# Patient Record
Sex: FEMALE | Race: WHITE | NOT HISPANIC OR LATINO | Employment: FULL TIME | URBAN - METROPOLITAN AREA
[De-identification: names, ages, dates, MRNs, and addresses within clinical notes are randomized per-mention and may not be internally consistent; named-entity substitution may affect disease eponyms.]

---

## 2017-04-25 ENCOUNTER — CONVERSION ENCOUNTER (OUTPATIENT)
Dept: GENERAL RADIOLOGY | Facility: HOSPITAL | Age: 42
End: 2017-04-25

## 2019-08-02 ENCOUNTER — HOSPITAL ENCOUNTER (OUTPATIENT)
Dept: OTHER | Facility: HOSPITAL | Age: 44
Discharge: HOME | End: 2019-08-02

## 2020-06-16 ENCOUNTER — OFFICE VISIT CONVERTED (OUTPATIENT)
Dept: FAMILY MEDICINE CLINIC | Facility: CLINIC | Age: 45
End: 2020-06-16
Attending: NURSE PRACTITIONER

## 2020-06-16 ENCOUNTER — CONVERSION ENCOUNTER (OUTPATIENT)
Dept: FAMILY MEDICINE CLINIC | Facility: CLINIC | Age: 45
End: 2020-06-16

## 2021-03-10 ENCOUNTER — IMMUNIZATION (OUTPATIENT)
Dept: VACCINE CLINIC | Facility: HOSPITAL | Age: 46
End: 2021-03-10

## 2021-03-10 PROCEDURE — 0001A: CPT | Performed by: INTERNAL MEDICINE

## 2021-03-10 PROCEDURE — 91300 HC SARSCOV02 VAC 30MCG/0.3ML IM: CPT | Performed by: INTERNAL MEDICINE

## 2021-03-31 ENCOUNTER — IMMUNIZATION (OUTPATIENT)
Dept: VACCINE CLINIC | Facility: HOSPITAL | Age: 46
End: 2021-03-31

## 2021-03-31 PROCEDURE — 91300 HC SARSCOV02 VAC 30MCG/0.3ML IM: CPT | Performed by: INTERNAL MEDICINE

## 2021-03-31 PROCEDURE — 0002A: CPT | Performed by: INTERNAL MEDICINE

## 2021-05-13 NOTE — PROGRESS NOTES
Progress Note      Patient Name: Mere Ivory   Patient ID: [id number]   Sex: Female   YOB: 1975    Primary Care Provider: Bala SANDOVAL    Visit Date: June 16, 2020    Provider: JAIME Kramer   Location: [facility]   Location Address: [address] [place] [address], [address] [place] [address]  [id number]   Location Phone: [phone]          Chief Complaint     The patient is here for a f/u of htn.       History Of Present Illness  Mere Ivory is a 44 year old /White female who presents for evaluation and treatment of:      Hypertension:  has been out of medicine and B/P 142/98.    Right foot pain for over a year.  Ibuprofen not helping but does manage to be able a shift.  Tenderness ans swelling when walking a long time           Past Medical History  Disease Name Date Onset Notes   Arthritis --  --    Hypertension --  --          Past Surgical History  Procedure Name Date Notes   LASIK 2011 --          Medication List  Name Date Started Instructions   hydrochlorothiazide 12.5 mg oral tablet 06/26/2018 TAKE 1 TABLET BY MOUTH EVERY DAY   losartan 100 mg oral tablet 01/22/2018 TAKE 1 TABLET (100 MG) BY ORAL ROUTE ONCE DAILY FOR 30 DAYS         Allergy List  Allergen Name Date Reaction Notes   No known history of drug allergy --  --  --          Family Medical History  Disease Name Relative/Age Notes   Diabetes Mother/   --          Social History  Finding Status Start/Stop Quantity Notes   Alcohol Current some day --/-- 1 x week --    Tobacco Former --/-- --  quit 2012 was a social smoker for 18 years         Immunizations  NameDate Admin Mfg Trade Name Lot Number Route Inj VIS Given VIS Publication   Prohbdqke26/01/2019 SKB Fluarix, quadrivalent, preservative free [id number] NE NE 06/16/2020    Comments:          Review of Systems  · Constitutional  o Denies  o : fever, fatigue, weight loss, weight gain  · Cardiovascular  o Denies  o : lower extremity edema, claudication, chest  "pressure, palpitations  · Respiratory  o Denies  o : shortness of breath, wheezing, cough, hemoptysis, dyspnea on exertion  · Gastrointestinal  o Denies  o : nausea, vomiting, diarrhea, constipation, abdominal pain      Vitals  Date Time BP Position Site L\R Cuff Size HR RR TEMP (F) WT  HT  BMI kg/m2 BSA m2 O2 Sat HC       06/16/2020 08:04 /98 Sitting    81 - R 16 97.1 280lbs 0oz 5'  8\" 42.57 2.47 97 %          Physical Examination  · Constitutional  o Appearance  o : well-nourished, well developed, alert, in no acute distress  · Eyes  o Conjunctivae  o : conjunctivae normal  o Sclerae  o : sclerae white  o Pupils and Irises  o : pupils equal, round, and reactive to light and accommodation bilaterally  o Corneas  o : tear film normal, no lesions present  o Eyelids/Ocular Adnexae  o : eyelid appearance normal, no exudates present, eye moisture level normal  · Respiratory  o Respiratory Effort  o : breathing unlabored  o Inspection of Chest  o : normal appearance, no retractions  o Auscultation of Lungs  o : normal breath sounds throughout  · Cardiovascular  o Heart  o :   § Auscultation of Heart  § : regular rate and rhythm without murmur, PMI normal  o Peripheral Vascular System  o :   § Extremities  § : no cyanosis, clubbing or edema; less than 2 second refill noted  · Musculoskeletal  o General  o : No joint swelling or deformity noted. Muscle tone, strength and development grossly normal.  · Skin and Subcutaneous Tissue  o General Inspection  o : no rashes or lesions present, no areas of discoloration  · Neurologic  o Mental Status Examination  o : judgement, insight intact, modd and affect appropriate  o Motor Examination  o : strength grossly intact in all four extremities  o Gait and Station  o : normal gait, able to stand without difficulty          Assessment  · Screening for depression     V79.0/Z13.89  · Visit for screening mammogram     V76.12/Z12.31  · Essential hypertension     401.9/I10  · Foot " pain, right     729.5/M79.671      Plan  · Orders  o Annual depression screening, 15 minutes (, 34270) - V79.0/Z13.89 - 06/16/2020  o ACO-18: Negative screen for clinical depression using a standardized tool () - V79.0/Z13.89 - 06/16/2020  o Screening Mammography; Bilateral 3D (21273, , 06032) - V76.12/Z12.31 - 06/16/2020  o HTN/Lipid Panel (CMP, Lipid) [facility] (08048, 26341) - 401.9/I10 - 06/16/2020  o CBC with Auto Diff [facility] (01840) - 401.9/I10 - 06/16/2020  o Urinalysis with Reflex Microscopy if abnormal ([facility]) (69483) - 401.9/I10 - 06/16/2020  o ACO-39: Current medications updated and reviewed () - - 06/16/2020  o ACO-14: Influenza immunization administered or previously received () - - 06/16/2020  o Foot (Right) 3 or more views X-Ray [facility] (71397-HI) - 729.5/M79.671 - 06/16/2020  · Medications  o hydrochlorothiazide 12.5 mg oral tablet   SIG: TAKE 1 TABLET BY MOUTH EVERY DAY for 90 days   DISP: (90) Tablet with 1 refills  Refilled on 06/16/2020     o losartan 100 mg oral tablet   SIG: TAKE 1 TABLET (100 MG) BY ORAL ROUTE ONCE DAILY FOR 30 DAYS for 90 days   DISP: (90) Tablet with 1 refills  Refilled on 06/16/2020     o Medications have been Reconciled  o Transition of Care or Provider Policy  · Instructions  o Depression Screen completed and scanned into the EMR under the designated folder within the patient's documents.  o Today's PHQ-9 result is 4___  o Patient was educated/instructed on their diagnosis, treatment and medications prior to discharge from the clinic today.  o Minutes spent with patient including greater than 50% in Education/Counseling/Care Coordination.  o Time spent with the patient was minutes, more than 50% face to face.  · Disposition  o Return in 6 months  o Return for Well woman exam            Electronically Signed by: JAIME Kramer -Author on June 16, 2020 08:34:44 AM

## 2021-05-15 VITALS
SYSTOLIC BLOOD PRESSURE: 142 MMHG | RESPIRATION RATE: 16 BRPM | WEIGHT: 280 LBS | OXYGEN SATURATION: 97 % | BODY MASS INDEX: 42.44 KG/M2 | TEMPERATURE: 97.1 F | HEART RATE: 81 BPM | HEIGHT: 68 IN | DIASTOLIC BLOOD PRESSURE: 98 MMHG

## 2021-08-12 ENCOUNTER — TELEPHONE (OUTPATIENT)
Dept: FAMILY MEDICINE CLINIC | Facility: CLINIC | Age: 46
End: 2021-08-12

## 2021-08-12 RX ORDER — HYDROCHLOROTHIAZIDE 12.5 MG/1
12.5 TABLET ORAL DAILY
Qty: 30 TABLET | Refills: 0 | Status: SHIPPED | OUTPATIENT
Start: 2021-08-12 | End: 2021-08-26 | Stop reason: SDUPTHER

## 2021-08-12 RX ORDER — LOSARTAN POTASSIUM 100 MG/1
100 TABLET ORAL DAILY
Qty: 30 TABLET | Refills: 0 | Status: SHIPPED | OUTPATIENT
Start: 2021-08-12 | End: 2021-08-26 | Stop reason: SDUPTHER

## 2021-08-12 RX ORDER — LOSARTAN POTASSIUM 100 MG/1
100 TABLET ORAL DAILY
COMMUNITY
Start: 2021-07-01 | End: 2021-08-12 | Stop reason: SDUPTHER

## 2021-08-12 RX ORDER — HYDROCHLOROTHIAZIDE 12.5 MG/1
12.5 TABLET ORAL DAILY
COMMUNITY
Start: 2021-07-01 | End: 2021-08-12 | Stop reason: SDUPTHER

## 2021-08-12 NOTE — TELEPHONE ENCOUNTER
Caller: Mere Ivory    Relationship: Self    Best call back number: [phone]    Medication needed:   Requested Prescriptions      No prescriptions requested or ordered in this encounter       LOSARTAN 100MG  ONE TABLET DAILY   HYDROCHLOROTHIAZIDE 12.5MG ONE TABLET     When do you need the refill by: 08/12/2021    What additional details did the patient provide when requesting the medication: THE PATIENT STATED SHE ONLY HAS THREE DAYS LEFT OF HER MEDICATIONS. SHE SCHEDULED THE FIRST AVAILABLE APPOINTMENT WITH PCP AND WOULD LIKE A REFILL TO LAST UNTIL HER NEXT APPOINTMENT. SHE WOULD LIKE A CALL BACK WHEN THIS MEDICATION IS APPROVED AND SENT TO THE PHARMACY AND A VOICEMAIL IF SHE CANNOT ANSWER.    Does the patient have less than a 3 day supply:  [x] Yes  [] No    What is the patient's preferred pharmacy: [facility] [employer] [address] [address] - [phone] [facility] [phone] [facility]          3

## 2021-08-26 ENCOUNTER — TELEPHONE (OUTPATIENT)
Dept: FAMILY MEDICINE CLINIC | Facility: CLINIC | Age: 46
End: 2021-08-26

## 2021-08-26 DIAGNOSIS — I10 HYPERTENSION, UNSPECIFIED TYPE: Primary | ICD-10-CM

## 2021-08-26 RX ORDER — LOSARTAN POTASSIUM 100 MG/1
100 TABLET ORAL DAILY
Qty: 30 TABLET | Refills: 0 | Status: SHIPPED | OUTPATIENT
Start: 2021-08-26 | End: 2021-09-23 | Stop reason: SDUPTHER

## 2021-08-26 RX ORDER — HYDROCHLOROTHIAZIDE 12.5 MG/1
12.5 TABLET ORAL DAILY
Qty: 30 TABLET | Refills: 0 | Status: SHIPPED | OUTPATIENT
Start: 2021-08-26 | End: 2021-09-23 | Stop reason: SDUPTHER

## 2021-08-26 NOTE — TELEPHONE ENCOUNTER
Caller: Mere Ivory    Relationship to patient: Self    Best call back number: [phone]    Patient is needing: PATIENT CALLED STATING SHE IS HAD TO RESCHEDULE HER APPOINTMENT WITH HER PCP ON 09/10/2021 DUE TO HER HAVING TO WORK, SHE WANTED TO BE SEEN SOONER BUT THERE WAS NO AVAILABILITY. FIRST AVAILABLE IS NOT UNTIL 09/23/2021. PATIENT IS WANTING TO KNOW IF SHE CAN GET ENOUGH OF A SUPPLY FOR HER TO LAST TILL THEN FOR HER hydroCHLOROthiazide (HYDRODIURIL) 12.5 MG tablet AND    losartan (COZAAR) 100 MG tablet     PATIENT WOULD LIKE A CALL BACK WHEN THIS GETS SENT TO THE PHARMACY PLEASE ADVISE THANK YOU.         [facility] - [place], [place] - [address] [address] - [phone]  - [phone]   [phone]

## 2021-09-23 ENCOUNTER — OFFICE VISIT (OUTPATIENT)
Dept: FAMILY MEDICINE CLINIC | Facility: CLINIC | Age: 46
End: 2021-09-23

## 2021-09-23 VITALS
OXYGEN SATURATION: 97 % | SYSTOLIC BLOOD PRESSURE: 110 MMHG | DIASTOLIC BLOOD PRESSURE: 70 MMHG | HEART RATE: 77 BPM | TEMPERATURE: 97 F | RESPIRATION RATE: 16 BRPM

## 2021-09-23 DIAGNOSIS — R25.2 LEG CRAMPS: ICD-10-CM

## 2021-09-23 DIAGNOSIS — I10 HYPERTENSION, UNSPECIFIED TYPE: ICD-10-CM

## 2021-09-23 DIAGNOSIS — R63.5 WEIGHT GAIN: ICD-10-CM

## 2021-09-23 DIAGNOSIS — F33.1 MODERATE EPISODE OF RECURRENT MAJOR DEPRESSIVE DISORDER (HCC): ICD-10-CM

## 2021-09-23 DIAGNOSIS — Z23 NEED FOR INFLUENZA VACCINATION: Primary | ICD-10-CM

## 2021-09-23 PROBLEM — M19.90 ARTHRITIS: Status: ACTIVE | Noted: 2021-09-23

## 2021-09-23 PROCEDURE — 99214 OFFICE O/P EST MOD 30 MIN: CPT | Performed by: NURSE PRACTITIONER

## 2021-09-23 PROCEDURE — 90686 IIV4 VACC NO PRSV 0.5 ML IM: CPT | Performed by: NURSE PRACTITIONER

## 2021-09-23 PROCEDURE — 90471 IMMUNIZATION ADMIN: CPT | Performed by: NURSE PRACTITIONER

## 2021-09-23 RX ORDER — LOSARTAN POTASSIUM 100 MG/1
100 TABLET ORAL DAILY
Qty: 90 TABLET | Refills: 1 | Status: SHIPPED | OUTPATIENT
Start: 2021-09-23 | End: 2021-11-19 | Stop reason: SDUPTHER

## 2021-09-23 RX ORDER — BUPROPION HYDROCHLORIDE 150 MG/1
150 TABLET ORAL DAILY
Qty: 90 TABLET | Refills: 1 | Status: SHIPPED | OUTPATIENT
Start: 2021-09-23 | End: 2021-11-19 | Stop reason: SDUPTHER

## 2021-09-23 RX ORDER — HYDROCHLOROTHIAZIDE 12.5 MG/1
12.5 TABLET ORAL DAILY
Qty: 30 TABLET | Refills: 0 | Status: SHIPPED | OUTPATIENT
Start: 2021-09-23 | End: 2021-11-19 | Stop reason: SDUPTHER

## 2021-09-23 NOTE — PROGRESS NOTES
Answers for HPI/ROS submitted by the patient on 9/23/2021  Please describe your symptoms.: Med refill and possible new med request  Have you had these symptoms before?: Yes  How long have you been having these symptoms?: Greater than 2 weeks  Please list any medications you are currently taking for this condition.: Losartan & Hctz  What is the primary reason for your visit?: Other    Chief Complaint  Hypertension (f/u), Anxiety (wants to restart buspar), and Obesity    Subjective        Mere Ivory presents to [facility]  Hypertension:  Doing well on medication and needs refills.  Only taking losartan consistently and hctz occasional due to cramping in legs.    Depression/ Anxiety:  Would like to restart buspirone.  Has had a little depression.    Obesity:  Has been heavy since childhood.         Past History:    Medical History: has a past medical history of Arthritis and HTN (hypertension).     Surgical History: has a past surgical history that includes LASIK (2011).     Family History: family history includes Diabetes in her mother.     Social History: reports that she has quit smoking. She smoked 0.00 packs per day for 0.00 years. She has never used smokeless tobacco. She reports current alcohol use. She reports that she does not use drugs.    Allergies: Patient has no known allergies.          Current Outpatient Medications:   •  hydroCHLOROthiazide (HYDRODIURIL) 12.5 MG tablet, Take 1 tablet by mouth Daily., Disp: 30 tablet, Rfl: 0  •  losartan (COZAAR) 100 MG tablet, Take 1 tablet by mouth Daily., Disp: 90 tablet, Rfl: 1  •  buPROPion XL (Wellbutrin XL) 150 MG 24 hr tablet, Take 1 tablet by mouth Daily., Disp: 90 tablet, Rfl: 1    Medications Discontinued During This Encounter   Medication Reason   • hydroCHLOROthiazide (HYDRODIURIL) 12.5 MG tablet Reorder   • losartan (COZAAR) 100 MG tablet Reorder         Review of Systems   Constitutional: Negative for fever.    Respiratory: Negative for cough and shortness of breath.    Cardiovascular: Negative for chest pain, palpitations and leg swelling.   Neurological: Negative for dizziness, weakness, numbness and headache.        Objective         Vitals:    09/23/21 1134   BP: 110/70   BP Location: Right arm   Patient Position: Sitting   Cuff Size: Large Adult   Pulse: 77   Resp: 16   Temp: 97 °F (36.1 °C)   TempSrc: Infrared   SpO2: 97%     There is no height or weight on file to calculate BMI.         Physical Exam  Vitals reviewed.   Constitutional:       Appearance: Normal appearance. She is well-developed.   HENT:      Head: Normocephalic and atraumatic.      Mouth/Throat:      Pharynx: No oropharyngeal exudate.   Eyes:      Conjunctiva/sclera: Conjunctivae normal.      Pupils: Pupils are equal, round, and reactive to light.   Cardiovascular:      Rate and Rhythm: Normal rate and regular rhythm.      Heart sounds: No murmur heard.   No friction rub. No gallop.    Pulmonary:      Effort: Pulmonary effort is normal.      Breath sounds: Normal breath sounds. No wheezing or rhonchi.   Skin:     General: Skin is warm and dry.   Neurological:      Mental Status: She is alert and oriented to person, place, and time.   Psychiatric:         Mood and Affect: Mood and affect normal.         Behavior: Behavior normal.         Thought Content: Thought content normal.         Judgment: Judgment normal.             Result Review :               Assessment and Plan     Diagnoses and all orders for this visit:    1. Need for influenza vaccination (Primary)  -     FluLaval/Fluarix >6 Months (2420-5179)    2. Hypertension, unspecified type  -     losartan (COZAAR) 100 MG tablet; Take 1 tablet by mouth Daily.  Dispense: 90 tablet; Refill: 1  -     hydroCHLOROthiazide (HYDRODIURIL) 12.5 MG tablet; Take 1 tablet by mouth Daily.  Dispense: 30 tablet; Refill: 0  -     Comprehensive metabolic panel; Future  -     Lipid Panel w/ Chol/HDL Ratio;  Future  -     Urinalysis With Culture If Indicated -; Future  -     CBC No Differential; Future  -     Magnesium; Future    3. Leg cramps    4. Moderate episode of recurrent major depressive disorder (CMS/HCC)  -     buPROPion XL (Wellbutrin XL) 150 MG 24 hr tablet; Take 1 tablet by mouth Daily.  Dispense: 90 tablet; Refill: 1    5. Weight gain  Comments:  will contemplate wegovy.              Follow Up     Return in about 2 months (around 11/23/2021).    Patient was given instructions and counseling regarding her condition or for health maintenance advice. Please see specific information pulled into the AVS if appropriate.

## 2021-10-20 ENCOUNTER — TELEPHONE (OUTPATIENT)
Dept: FAMILY MEDICINE CLINIC | Facility: CLINIC | Age: 46
End: 2021-10-20

## 2021-10-20 NOTE — TELEPHONE ENCOUNTER
I will call to get consent for the monoclonal antibodies patient states she is feeling much better and does not feel like she needs it at this time.  So is just going away with getting the monoclonal antibodies.  Denies shortness of breath or fever.

## 2021-11-03 ENCOUNTER — LAB (OUTPATIENT)
Dept: LAB | Facility: HOSPITAL | Age: 46
End: 2021-11-03

## 2021-11-03 DIAGNOSIS — I10 HYPERTENSION, UNSPECIFIED TYPE: ICD-10-CM

## 2021-11-03 LAB
ALBUMIN SERPL-MCNC: 4.1 G/DL (ref 3.5–5.2)
ALBUMIN/GLOB SERPL: 1.1 G/DL
ALP SERPL-CCNC: 62 U/L (ref 39–117)
ALT SERPL W P-5'-P-CCNC: 23 U/L (ref 1–33)
ANION GAP SERPL CALCULATED.3IONS-SCNC: 8.2 MMOL/L (ref 5–15)
AST SERPL-CCNC: 18 U/L (ref 1–32)
BILIRUB SERPL-MCNC: 0.9 MG/DL (ref 0–1.2)
BUN SERPL-MCNC: 11 MG/DL (ref 6–20)
BUN/CREAT SERPL: 16.2 (ref 7–25)
CALCIUM SPEC-SCNC: 9 MG/DL (ref 8.6–10.5)
CHLORIDE SERPL-SCNC: 101 MMOL/L (ref 98–107)
CHOLEST SERPL-MCNC: 191 MG/DL (ref 0–200)
CO2 SERPL-SCNC: 26.8 MMOL/L (ref 22–29)
CREAT SERPL-MCNC: 0.68 MG/DL (ref 0.57–1)
DEPRECATED RDW RBC AUTO: 40.3 FL (ref 37–54)
ERYTHROCYTE [DISTWIDTH] IN BLOOD BY AUTOMATED COUNT: 13.2 % (ref 12.3–15.4)
GFR SERPL CREATININE-BSD FRML MDRD: 92 ML/MIN/1.73
GLOBULIN UR ELPH-MCNC: 3.6 GM/DL
GLUCOSE SERPL-MCNC: 87 MG/DL (ref 65–99)
HCT VFR BLD AUTO: 38.6 % (ref 34–46.6)
HDLC SERPL QL: 3.9
HDLC SERPL-MCNC: 49 MG/DL (ref 40–60)
HGB BLD-MCNC: 13 G/DL (ref 12–15.9)
LDLC SERPL CALC-MCNC: 124 MG/DL (ref 0–100)
MAGNESIUM SERPL-MCNC: 2.1 MG/DL (ref 1.6–2.6)
MCH RBC QN AUTO: 28.5 PG (ref 26.6–33)
MCHC RBC AUTO-ENTMCNC: 33.7 G/DL (ref 31.5–35.7)
MCV RBC AUTO: 84.6 FL (ref 79–97)
PLATELET # BLD AUTO: 253 10*3/MM3 (ref 140–450)
PMV BLD AUTO: 10.5 FL (ref 6–12)
POTASSIUM SERPL-SCNC: 4.3 MMOL/L (ref 3.5–5.2)
PROT SERPL-MCNC: 7.7 G/DL (ref 6–8.5)
RBC # BLD AUTO: 4.56 10*6/MM3 (ref 3.77–5.28)
SODIUM SERPL-SCNC: 136 MMOL/L (ref 136–145)
TRIGL SERPL-MCNC: 101 MG/DL (ref 0–150)
VLDLC SERPL-MCNC: 18 MG/DL (ref 5–40)
WBC # BLD AUTO: 7.44 10*3/MM3 (ref 3.4–10.8)

## 2021-11-03 PROCEDURE — 83735 ASSAY OF MAGNESIUM: CPT

## 2021-11-03 PROCEDURE — 85027 COMPLETE CBC AUTOMATED: CPT

## 2021-11-03 PROCEDURE — 80053 COMPREHEN METABOLIC PANEL: CPT

## 2021-11-03 PROCEDURE — 36415 COLL VENOUS BLD VENIPUNCTURE: CPT

## 2021-11-03 PROCEDURE — 80061 LIPID PANEL: CPT

## 2021-11-18 ENCOUNTER — LAB (OUTPATIENT)
Dept: LAB | Facility: HOSPITAL | Age: 46
End: 2021-11-18

## 2021-11-18 DIAGNOSIS — I10 HYPERTENSION, UNSPECIFIED TYPE: ICD-10-CM

## 2021-11-18 LAB

## 2021-11-18 PROCEDURE — 81001 URINALYSIS AUTO W/SCOPE: CPT

## 2021-11-19 ENCOUNTER — OFFICE VISIT (OUTPATIENT)
Dept: FAMILY MEDICINE CLINIC | Facility: CLINIC | Age: 46
End: 2021-11-19

## 2021-11-19 VITALS — DIASTOLIC BLOOD PRESSURE: 90 MMHG | HEART RATE: 91 BPM | TEMPERATURE: 98.2 F | SYSTOLIC BLOOD PRESSURE: 142 MMHG

## 2021-11-19 DIAGNOSIS — I10 PRIMARY HYPERTENSION: ICD-10-CM

## 2021-11-19 DIAGNOSIS — R31.9 HEMATURIA, UNSPECIFIED TYPE: Primary | ICD-10-CM

## 2021-11-19 DIAGNOSIS — Z12.4 SCREENING FOR CERVICAL CANCER: ICD-10-CM

## 2021-11-19 DIAGNOSIS — I10 HYPERTENSION, UNSPECIFIED TYPE: ICD-10-CM

## 2021-11-19 DIAGNOSIS — Z12.11 SCREEN FOR COLON CANCER: Primary | ICD-10-CM

## 2021-11-19 DIAGNOSIS — F33.1 MODERATE EPISODE OF RECURRENT MAJOR DEPRESSIVE DISORDER (HCC): ICD-10-CM

## 2021-11-19 PROCEDURE — 99214 OFFICE O/P EST MOD 30 MIN: CPT | Performed by: NURSE PRACTITIONER

## 2021-11-19 RX ORDER — LOSARTAN POTASSIUM 100 MG/1
100 TABLET ORAL DAILY
Qty: 90 TABLET | Refills: 1 | Status: SHIPPED | OUTPATIENT
Start: 2021-11-19 | End: 2022-09-26

## 2021-11-19 RX ORDER — BUPROPION HYDROCHLORIDE 300 MG/1
300 TABLET ORAL DAILY
Qty: 90 TABLET | Refills: 1 | Status: SHIPPED | OUTPATIENT
Start: 2021-11-19 | End: 2022-05-27

## 2021-11-19 RX ORDER — HYDROCHLOROTHIAZIDE 12.5 MG/1
12.5 TABLET ORAL DAILY
Qty: 30 TABLET | Refills: 0 | Status: SHIPPED | OUTPATIENT
Start: 2021-11-19 | End: 2022-04-15

## 2021-11-19 NOTE — PROGRESS NOTES
Chief Complaint  Depression (f/u wants to discuss increasing medication), Anxiety (f/u), Hypertension (f/u), ob/gyn referral, and Foot Pain (heel right)    Subjective        Mere Ivory presents to [facility]  Had a spur and bursitis in left heel. First steroid shot helped but the second didn't.  Tried a third time and not helped. Has a brace for foot that didn't help.  Would like a second opinion.    Depression:  Doing better than she was.  The feeling of wanting to jump out of skin is gone.  But still having some symptoms.      Hypertension:  Doing well but blood pressure up a little but had some coffee before coming in.  142/90.    OB/Gyn:  Would like a referral to see for perimenopausal symptoms and having some symptoms.  Was discussed with that may have PCOS.  Had bee prescribed metformin but mom had kidney failure so never started.  States has irregular periods for years.        Past History:    Medical History: has a past medical history of Arthritis and HTN (hypertension).     Surgical History: has a past surgical history that includes LASIK (2011).     Family History: family history includes Diabetes in her mother.     Social History: reports that she has quit smoking. She smoked 0.00 packs per day for 0.00 years. She has never used smokeless tobacco. She reports current alcohol use. She reports that she does not use drugs.    Allergies: Patient has no known allergies.          Current Outpatient Medications:   •  buPROPion XL (Wellbutrin XL) 300 MG 24 hr tablet, Take 1 tablet by mouth Daily., Disp: 90 tablet, Rfl: 1  •  hydroCHLOROthiazide (HYDRODIURIL) 12.5 MG tablet, Take 1 tablet by mouth Daily., Disp: 30 tablet, Rfl: 0  •  losartan (COZAAR) 100 MG tablet, Take 1 tablet by mouth Daily., Disp: 90 tablet, Rfl: 1    Medications Discontinued During This Encounter   Medication Reason   • buPROPion XL (Wellbutrin XL) 150 MG 24 hr tablet Reorder         Review of  Systems   Constitutional: Negative for fever.   Respiratory: Negative for cough and shortness of breath.    Cardiovascular: Negative for chest pain, palpitations and leg swelling.   Neurological: Negative for dizziness, weakness, numbness and headache.        Objective         Vitals:    11/19/21 1406   BP: 142/90   BP Location: Right arm   Patient Position: Sitting   Cuff Size: Large Adult   Pulse: 91   Temp: 98.2 °F (36.8 °C)   TempSrc: Infrared     There is no height or weight on file to calculate BMI.         Physical Exam  Vitals reviewed.   Constitutional:       Appearance: Normal appearance. She is well-developed.   HENT:      Head: Normocephalic and atraumatic.      Mouth/Throat:      Pharynx: No oropharyngeal exudate.   Eyes:      Conjunctiva/sclera: Conjunctivae normal.      Pupils: Pupils are equal, round, and reactive to light.   Cardiovascular:      Rate and Rhythm: Normal rate and regular rhythm.      Heart sounds: No murmur heard.  No friction rub. No gallop.    Pulmonary:      Effort: Pulmonary effort is normal.      Breath sounds: Normal breath sounds. No wheezing or rhonchi.   Skin:     General: Skin is warm and dry.   Neurological:      Mental Status: She is alert and oriented to person, place, and time.   Psychiatric:         Mood and Affect: Mood and affect normal.         Behavior: Behavior normal.         Thought Content: Thought content normal.         Judgment: Judgment normal.             Result Review :               Assessment and Plan     Diagnoses and all orders for this visit:    1. Screen for colon cancer (Primary)  -     Cologuard - Stool, Per Rectum; Future    2. Screening for cervical cancer  -     Ambulatory Referral to Obstetrics / Gynecology    3. Moderate episode of recurrent major depressive disorder (HCC)  -     buPROPion XL (Wellbutrin XL) 300 MG 24 hr tablet; Take 1 tablet by mouth Daily.  Dispense: 90 tablet; Refill: 1    4. Primary hypertension  Comments:  continue  losartan at current dose.              Follow Up     Return in about 6 months (around 5/19/2022) for Next scheduled follow up.    Patient was given instructions and counseling regarding her condition or for health maintenance advice. Please see specific information pulled into the AVS if appropriate.

## 2022-02-09 ENCOUNTER — TELEPHONE (OUTPATIENT)
Dept: SURGERY | Facility: CLINIC | Age: 47
End: 2022-02-09

## 2022-02-09 DIAGNOSIS — R19.5 POSITIVE COLORECTAL CANCER SCREENING USING COLOGUARD TEST: Primary | ICD-10-CM

## 2022-02-11 NOTE — TELEPHONE ENCOUNTER
3RD ATTEMPT TO REACH PT NO ANSWER LMOM, HUB SENDING BACK TO REFERRING PROVIDER TO LET THEM KNOW WE ARE UNABLE TO REACH PT.

## 2022-02-18 ENCOUNTER — OFFICE VISIT (OUTPATIENT)
Dept: PODIATRY | Facility: CLINIC | Age: 47
End: 2022-02-18

## 2022-02-18 VITALS
HEIGHT: 68 IN | BODY MASS INDEX: 44.41 KG/M2 | WEIGHT: 293 LBS | SYSTOLIC BLOOD PRESSURE: 137 MMHG | DIASTOLIC BLOOD PRESSURE: 80 MMHG | HEART RATE: 67 BPM | TEMPERATURE: 96.9 F | OXYGEN SATURATION: 100 %

## 2022-02-18 DIAGNOSIS — M76.61 ACHILLES TENDINITIS OF RIGHT LOWER EXTREMITY: ICD-10-CM

## 2022-02-18 DIAGNOSIS — M77.51 BURSITIS OF RIGHT FOOT: ICD-10-CM

## 2022-02-18 DIAGNOSIS — M79.671 FOOT PAIN, RIGHT: Primary | ICD-10-CM

## 2022-02-18 PROCEDURE — 99204 OFFICE O/P NEW MOD 45 MIN: CPT | Performed by: PODIATRIST

## 2022-02-18 RX ORDER — DICLOFENAC SODIUM 75 MG/1
75 TABLET, DELAYED RELEASE ORAL 2 TIMES DAILY
Qty: 60 TABLET | Refills: 1 | Status: SHIPPED | OUTPATIENT
Start: 2022-02-18 | End: 2022-06-27

## 2022-02-18 RX ORDER — METHYLPREDNISOLONE 4 MG/1
TABLET ORAL
Qty: 21 TABLET | Refills: 0 | Status: SHIPPED | OUTPATIENT
Start: 2022-02-18 | End: 2022-03-11

## 2022-02-18 NOTE — PROGRESS NOTES
[facility] - PODIATRY    Today's Date: 02/18/22    Patient Name: Mere Ivory  MRN: [id number]  CSN: [id number]  PCP: Bala Thompson APRN  Referring Provider: Referring, Self    SUBJECTIVE     Chief Complaint   Patient presents with   • Right Ankle - Establish Care, Pain     Wants second opinion  Saw [place] [facility] (records on chart)     HPI: Mere Ivory, a 46 y.o.female, presents to clinic.    New, Established, New Problem: New    Location: Posterior right heel    Duration: 2021    Onset: Insidious    Nature: Sore, sharp    Stable, worsening, improving: Recurring    Aggravating factors:  Patient relates pain is aggravated by shoe gear and ambulation.      Previous Treatment: Previous cortisone injections by [place] [facility]    Patient denies any fevers, chills, nausea, vomiting, shortness of breath, nor any other constitutional signs nor symptoms.    No other pedal complaints at this time.     Dr. Russ reviewed multiple records from [place] [facility].      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    Past Medical History:   Diagnosis Date   • Ankle pain, right    • Arthritis    • HTN (hypertension)      Past Surgical History:   Procedure Laterality Date   • LASIK  2011     Family History   Problem Relation Age of Onset   • Diabetes Mother    • Hypertension Mother    • Stroke Neg Hx      Social History     Socioeconomic History   • Marital status:    Tobacco Use   • Smoking status: Former Smoker     Packs/day: 0.00     Years: 0.00     Pack years: 0.00   • Smokeless tobacco: Never Used   • Tobacco comment: QUIT 2012 WAS A SOCIAL SMOKER FOR 18 YEARS   Vaping Use   • Vaping Use: Never used   Substance and Sexual Activity   • Alcohol use: Yes     Alcohol/week: 0.0 standard drinks     Comment: 1 X WEEK   • Drug use: Never   • Sexual activity: Yes     Partners: Male     Birth  control/protection: None     No Known Allergies  Current Outpatient Medications   Medication Sig Dispense Refill   • buPROPion XL (Wellbutrin XL) 300 MG 24 hr tablet Take 1 tablet by mouth Daily. 90 tablet 1   • hydroCHLOROthiazide (HYDRODIURIL) 12.5 MG tablet Take 1 tablet by mouth Daily. (Patient taking differently: Take 12.5 mg by mouth Daily. As needed) 30 tablet 0   • losartan (COZAAR) 100 MG tablet Take 1 tablet by mouth Daily. 90 tablet 1   • diclofenac (VOLTAREN) 75 MG EC tablet Take 1 tablet by mouth 2 (Two) Times a Day for 30 days. 60 tablet 1   • methylPREDNISolone (MEDROL) 4 MG dose pack Take as directed 21 tablet 0     No current facility-administered medications for this visit.     Review of Systems   Musculoskeletal:        Posterior aspect of right heel   All other systems reviewed and are negative.      OBJECTIVE     Vitals:    02/18/22 0957   BP: 137/80   Pulse: 67   Temp: 96.9 °F (36.1 °C)   SpO2: 100%       WBC   Date Value Ref Range Status   11/03/2021 7.44 3.40 - 10.80 10*3/mm3 Final     RBC   Date Value Ref Range Status   11/03/2021 4.56 3.77 - 5.28 10*6/mm3 Final     Hemoglobin   Date Value Ref Range Status   11/03/2021 13.0 12.0 - 15.9 g/dL Final     Hematocrit   Date Value Ref Range Status   11/03/2021 38.6 34.0 - 46.6 % Final     MCV   Date Value Ref Range Status   11/03/2021 84.6 79.0 - 97.0 fL Final     MCH   Date Value Ref Range Status   11/03/2021 28.5 26.6 - 33.0 pg Final     MCHC   Date Value Ref Range Status   11/03/2021 33.7 31.5 - 35.7 g/dL Final     RDW   Date Value Ref Range Status   11/03/2021 13.2 12.3 - 15.4 % Final     RDW-SD   Date Value Ref Range Status   11/03/2021 40.3 37.0 - 54.0 fl Final     MPV   Date Value Ref Range Status   11/03/2021 10.5 6.0 - 12.0 fL Final     Platelets   Date Value Ref Range Status   11/03/2021 253 140 - 450 10*3/mm3 Final         Lab Results   Component Value Date    GLUCOSE 87 11/03/2021    BUN 11 11/03/2021    CREATININE 0.68 11/03/2021     EGFRIFNONA 92 11/03/2021    BCR 16.2 11/03/2021    K 4.3 11/03/2021    CO2 26.8 11/03/2021    CALCIUM 9.0 11/03/2021    ALBUMIN 4.10 11/03/2021    AST 18 11/03/2021    ALT 23 11/03/2021       Patient seen in no apparent distress.      PHYSICAL EXAM:     Foot/Ankle Exam:       General:   Appearance comment:  Morbidly obese  Orientation: AAOx3    Affect: appropriate    Gait: unimpaired    Shoe Gear:  Casual shoes    VASCULAR      Right Foot Vascularity   Normal vascular exam    Dorsalis pedis:  2+  Posterior tibial:  2+  Skin Temperature: warm    Edema Grading:  None  CFT:  < 3 seconds  Pedal Hair Growth:  Present  Varicosities: none       Left Foot Vascularity   Normal vascular exam    Dorsalis pedis:  2+  Posterior tibial:  2+  Skin Temperature: warm    Edema Grading:  None  CFT:  < 3 seconds  Pedal Hair Growth:  Present  Varicosities: none        NEUROLOGIC     Right Foot Neurologic   Normal sensation    Light touch sensation:  Normal  Vibratory sensation:  Normal  Hot/Cold sensation: normal    Protective Sensation using [place]-Ammon Monofilament:  10     Left Foot Neurologic   Normal sensation    Light touch sensation:  Normal  Vibratory sensation:  Normal  Hot/cold sensation: normal    Protective Sensation using [place]-Ammon Monofilament:  10     MUSCULOSKELETAL      Right Foot Musculoskeletal   Tenderness: posterior heel and achilles    Tenderness comment:  Retro-calcaneal bursa area.  No signs of edema, erythema, lymphangitis, nor signs of infection.       MUSCLE STRENGTH     Right Foot Muscle Strength   Normal strength    Foot dorsiflexion:  5  Foot plantar flexion:  5  Foot inversion:  5  Foot eversion:  5     Left Foot Muscle Strength   Foot dorsiflexion:  5  Foot plantar flexion:  5  Foot inversion:  5  Foot eversion:  5     RANGE OF MOTION      Right Foot Range of Motion   Foot and ankle ROM within normal limits       Left Foot Range of Motion   Foot and ankle ROM within normal limits        DERMATOLOGIC     Right Foot Dermatologic   Skin: skin intact    Nails: normal       Left Foot Dermatologic   Skin: skin intact    Nails: normal        ASSESSMENT/PLAN     Diagnoses and all orders for this visit:    1. Foot pain, right (Primary)    2. Achilles tendinitis of right lower extremity    3. Bursitis of right foot  -     methylPREDNISolone (MEDROL) 4 MG dose pack; Take as directed  Dispense: 21 tablet; Refill: 0  -     diclofenac (VOLTAREN) 75 MG EC tablet; Take 1 tablet by mouth 2 (Two) Times a Day for 30 days.  Dispense: 60 tablet; Refill: 1        Comprehensive lower extremity examination and evaluation was performed.    Discussed findings and treatment plan including risks, benefits, and treatment options with patient in detail. Patient agreed with treatment plan.    Medications and allergies reviewed.  Reviewed available lab values along with other pertinent labs.  These were discussed with the patient.    CAM walker applied to Right lower leg.  Patient instructed to utilize for partial-weight bearing at all time with CAM walker.  The patient states understanding and agreement with this plan.  Dr. Russ advised patient may resume driving, but advised not to drive while wearing an ambulatory device.  Advised quick/hard depression of brakes could cause injury to areas.  Also advised of possible legal implications while driving in restrictive device.  The patient states understanding and agreement with these instructions.    Rice Therapy: It is important to treat any injury as soon as possible to help control swelling and increase recovery time. The recognized regimen for immediate treatment of sport injuries includes rest, ice (cold application), compression, and elevation (RICE). Remove the injured athlete from play, apply ice to the affected area, wrap or compress the injured area with an elastic bandage when appropriate, and elevate the injured area above heart level to reduce swelling.  The  patient is to not use ice for longer than 20 minutes at a time, with at least 20 minutes of no ice usage between applications.  The patient states understanding and agreement with this plan.    Discussed proper shoegear for the patient's feet and medical condition.  Patient states understanding and agreement with this plan.    Recommended use of Tuli heel cups while working.    An After Visit Summary was printed and given to the patient at discharge, including (if requested) any available informative/educational handouts regarding diagnosis, treatment, or medications. All questions were answered to patient/family satisfaction. Should symptoms fail to improve or worsen they agree to call or return to clinic or to go to the Emergency Department. Discussed the importance of following up with any needed screening tests/labs/specialist appointments and any requested follow-up recommended by me today. Importance of maintaining follow-up discussed and patient accepts that missed appointments can delay diagnosis and potentially lead to worsening of conditions.    Return if symptoms worsen or fail to improve, for Patient request PRN follow-up.  ., or sooner if acute issues arise.    This document has been electronically signed by Sergei Russ DPM on February 18, 2022 10:43 EST

## 2022-03-11 ENCOUNTER — OFFICE VISIT (OUTPATIENT)
Dept: PODIATRY | Facility: CLINIC | Age: 47
End: 2022-03-11

## 2022-03-11 VITALS
WEIGHT: 293 LBS | DIASTOLIC BLOOD PRESSURE: 78 MMHG | SYSTOLIC BLOOD PRESSURE: 127 MMHG | OXYGEN SATURATION: 100 % | BODY MASS INDEX: 44.41 KG/M2 | HEART RATE: 77 BPM | TEMPERATURE: 95.7 F | HEIGHT: 68 IN

## 2022-03-11 DIAGNOSIS — M76.61 ACHILLES TENDINITIS OF RIGHT LOWER EXTREMITY: ICD-10-CM

## 2022-03-11 DIAGNOSIS — S86.011A ACHILLES TENDON RUPTURE, RIGHT, INITIAL ENCOUNTER: ICD-10-CM

## 2022-03-11 DIAGNOSIS — M77.51 BURSITIS OF RIGHT FOOT: ICD-10-CM

## 2022-03-11 DIAGNOSIS — M79.671 FOOT PAIN, RIGHT: Primary | ICD-10-CM

## 2022-03-11 PROCEDURE — 99213 OFFICE O/P EST LOW 20 MIN: CPT | Performed by: PODIATRIST
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
## 2022-03-14 ENCOUNTER — HOSPITAL ENCOUNTER (OUTPATIENT)
Dept: MRI IMAGING | Facility: HOSPITAL | Age: 47
Discharge: HOME | End: 2022-03-14

## 2022-03-14 ENCOUNTER — HOSPITAL ENCOUNTER (OUTPATIENT)
Dept: GENERAL RADIOLOGY | Facility: HOSPITAL | Age: 47
Discharge: HOME | End: 2022-03-14

## 2022-03-14 DIAGNOSIS — S86.011A ACHILLES TENDON RUPTURE, RIGHT, INITIAL ENCOUNTER: ICD-10-CM

## 2022-03-14 PROCEDURE — 73721 MRI JNT OF LWR EXTRE W/O DYE: CPT

## 2022-03-14 PROCEDURE — 73610 X-RAY EXAM OF ANKLE: CPT

## 2022-03-15 ENCOUNTER — OFFICE VISIT (OUTPATIENT)
Dept: PODIATRY | Facility: CLINIC | Age: 47
End: 2022-03-15

## 2022-03-15 VITALS
HEART RATE: 77 BPM | WEIGHT: 293 LBS | HEIGHT: 68 IN | DIASTOLIC BLOOD PRESSURE: 85 MMHG | BODY MASS INDEX: 44.41 KG/M2 | SYSTOLIC BLOOD PRESSURE: 146 MMHG | OXYGEN SATURATION: 100 %

## 2022-03-15 DIAGNOSIS — M79.671 FOOT PAIN, RIGHT: Primary | ICD-10-CM

## 2022-03-15 DIAGNOSIS — S86.011D ACHILLES TENDON RUPTURE, RIGHT, SUBSEQUENT ENCOUNTER: ICD-10-CM

## 2022-03-15 PROCEDURE — 99214 OFFICE O/P EST MOD 30 MIN: CPT | Performed by: PODIATRIST
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
## 2022-03-18 ENCOUNTER — ANESTHESIA (OUTPATIENT)
Dept: PERIOP | Facility: HOSPITAL | Age: 47
End: 2022-03-18

## 2022-03-18 ENCOUNTER — ANESTHESIA EVENT (OUTPATIENT)
Dept: PERIOP | Facility: HOSPITAL | Age: 47
End: 2022-03-18

## 2022-03-18 ENCOUNTER — HOSPITAL ENCOUNTER (OUTPATIENT)
Facility: HOSPITAL | Age: 47
Setting detail: HOSPITAL OUTPATIENT SURGERY
Discharge: HOME | End: 2022-03-18
Attending: PODIATRIST | Admitting: PODIATRIST

## 2022-03-18 VITALS
TEMPERATURE: 97.3 F | SYSTOLIC BLOOD PRESSURE: 133 MMHG | OXYGEN SATURATION: 98 % | WEIGHT: 293 LBS | HEIGHT: 67 IN | RESPIRATION RATE: 16 BRPM | HEART RATE: 76 BPM | DIASTOLIC BLOOD PRESSURE: 73 MMHG | BODY MASS INDEX: 45.99 KG/M2

## 2022-03-18 DIAGNOSIS — S86.011D ACHILLES TENDON RUPTURE, RIGHT, SUBSEQUENT ENCOUNTER: ICD-10-CM

## 2022-03-18 LAB — B-HCG UR QL: NEGATIVE

## 2022-03-18 PROCEDURE — 25010000002 HYDROMORPHONE 1 MG/ML SOLUTION: Performed by: NURSE ANESTHETIST, CERTIFIED REGISTERED

## 2022-03-18 PROCEDURE — 25010000002 FENTANYL CITRATE (PF) 50 MCG/ML SOLUTION: Performed by: NURSE ANESTHETIST, CERTIFIED REGISTERED

## 2022-03-18 PROCEDURE — C1713 ANCHOR/SCREW BN/BN,TIS/BN: HCPCS | Performed by: PODIATRIST

## 2022-03-18 PROCEDURE — 25010000002 KETOROLAC TROMETHAMINE PER 15 MG: Performed by: NURSE ANESTHETIST, CERTIFIED REGISTERED

## 2022-03-18 PROCEDURE — 25010000002 PROPOFOL 10 MG/ML EMULSION: Performed by: NURSE ANESTHETIST, CERTIFIED REGISTERED

## 2022-03-18 PROCEDURE — 25010000002 ONDANSETRON PER 1 MG: Performed by: NURSE ANESTHETIST, CERTIFIED REGISTERED

## 2022-03-18 PROCEDURE — 27650 REPAIR ACHILLES TENDON: CPT | Performed by: SPECIALIST/TECHNOLOGIST, OTHER

## 2022-03-18 PROCEDURE — 25010000002 DEXAMETHASONE PER 1 MG: Performed by: NURSE ANESTHETIST, CERTIFIED REGISTERED

## 2022-03-18 PROCEDURE — 25010000002 CEFAZOLIN PER 500 MG: Performed by: PODIATRIST

## 2022-03-18 PROCEDURE — 25010000002 MIDAZOLAM PER 1 MG: Performed by: ANESTHESIOLOGY

## 2022-03-18 PROCEDURE — C1889 IMPLANT/INSERT DEVICE, NOC: HCPCS | Performed by: PODIATRIST

## 2022-03-18 PROCEDURE — 27650 REPAIR ACHILLES TENDON: CPT | Performed by: PODIATRIST

## 2022-03-18 PROCEDURE — 25010000002 HYDROMORPHONE PER 4 MG: Performed by: NURSE ANESTHETIST, CERTIFIED REGISTERED

## 2022-03-18 PROCEDURE — 28118 REMOVAL OF HEEL BONE: CPT | Performed by: PODIATRIST

## 2022-03-18 PROCEDURE — 81025 URINE PREGNANCY TEST: CPT | Performed by: STUDENT IN AN ORGANIZED HEALTH CARE EDUCATION/TRAINING PROGRAM

## 2022-03-18 PROCEDURE — 28118 REMOVAL OF HEEL BONE: CPT | Performed by: SPECIALIST/TECHNOLOGIST, OTHER

## 2022-03-18 DEVICE — SYS SUT/ANCH ACHILLES SPEEDBRIDGE MIDSUBTANCE: Type: IMPLANTABLE DEVICE | Site: ACHILLES TENDON | Status: FUNCTIONAL

## 2022-03-18 DEVICE — SYS IMP ACHILLES PARS SUTURETAPE W/NO2/FIBERWIRE: Type: IMPLANTABLE DEVICE | Site: ACHILLES TENDON | Status: FUNCTIONAL

## 2022-03-18 RX ORDER — ROCURONIUM BROMIDE 10 MG/ML
INJECTION, SOLUTION INTRAVENOUS AS NEEDED
Status: DISCONTINUED | OUTPATIENT
Start: 2022-03-18 | End: 2022-03-18 | Stop reason: SURG

## 2022-03-18 RX ORDER — ONDANSETRON 2 MG/ML
4 INJECTION INTRAMUSCULAR; INTRAVENOUS ONCE AS NEEDED
Status: DISCONTINUED | OUTPATIENT
Start: 2022-03-18 | End: 2022-03-18 | Stop reason: HOSPADM

## 2022-03-18 RX ORDER — PROPOFOL 10 MG/ML
VIAL (ML) INTRAVENOUS AS NEEDED
Status: DISCONTINUED | OUTPATIENT
Start: 2022-03-18 | End: 2022-03-18 | Stop reason: SURG

## 2022-03-18 RX ORDER — FENTANYL CITRATE 50 UG/ML
INJECTION, SOLUTION INTRAMUSCULAR; INTRAVENOUS AS NEEDED
Status: DISCONTINUED | OUTPATIENT
Start: 2022-03-18 | End: 2022-03-18 | Stop reason: SURG

## 2022-03-18 RX ORDER — MIDAZOLAM HYDROCHLORIDE 1 MG/ML
2 INJECTION INTRAMUSCULAR; INTRAVENOUS ONCE
Status: COMPLETED | OUTPATIENT
Start: 2022-03-18 | End: 2022-03-18

## 2022-03-18 RX ORDER — LIDOCAINE HYDROCHLORIDE 20 MG/ML
INJECTION, SOLUTION INFILTRATION; PERINEURAL AS NEEDED
Status: DISCONTINUED | OUTPATIENT
Start: 2022-03-18 | End: 2022-03-18 | Stop reason: SURG

## 2022-03-18 RX ORDER — MAGNESIUM HYDROXIDE 1200 MG/15ML
LIQUID ORAL AS NEEDED
Status: DISCONTINUED | OUTPATIENT
Start: 2022-03-18 | End: 2022-03-18 | Stop reason: HOSPADM

## 2022-03-18 RX ORDER — BUPIVACAINE HYDROCHLORIDE 2.5 MG/ML
INJECTION, SOLUTION EPIDURAL; INFILTRATION; INTRACAUDAL AS NEEDED
Status: DISCONTINUED | OUTPATIENT
Start: 2022-03-18 | End: 2022-03-18 | Stop reason: HOSPADM

## 2022-03-18 RX ORDER — PROMETHAZINE HYDROCHLORIDE 12.5 MG/1
12.5 TABLET ORAL EVERY 8 HOURS PRN
Qty: 30 TABLET | Refills: 0 | Status: SHIPPED | OUTPATIENT
Start: 2022-03-18 | End: 2022-04-15

## 2022-03-18 RX ORDER — HYDROMORPHONE HCL 110MG/55ML
PATIENT CONTROLLED ANALGESIA SYRINGE INTRAVENOUS AS NEEDED
Status: DISCONTINUED | OUTPATIENT
Start: 2022-03-18 | End: 2022-03-18 | Stop reason: SURG

## 2022-03-18 RX ORDER — MEPERIDINE HYDROCHLORIDE 25 MG/ML
12.5 INJECTION INTRAMUSCULAR; INTRAVENOUS; SUBCUTANEOUS
Status: DISCONTINUED | OUTPATIENT
Start: 2022-03-18 | End: 2022-03-18 | Stop reason: HOSPADM

## 2022-03-18 RX ORDER — KETOROLAC TROMETHAMINE 30 MG/ML
INJECTION, SOLUTION INTRAMUSCULAR; INTRAVENOUS AS NEEDED
Status: DISCONTINUED | OUTPATIENT
Start: 2022-03-18 | End: 2022-03-18 | Stop reason: SURG

## 2022-03-18 RX ORDER — HYDROCODONE BITARTRATE AND ACETAMINOPHEN 5; 325 MG/1; MG/1
1-2 TABLET ORAL EVERY 4 HOURS PRN
Qty: 30 TABLET | Refills: 0 | Status: SHIPPED | OUTPATIENT
Start: 2022-03-18 | End: 2022-03-22 | Stop reason: SDUPTHER

## 2022-03-18 RX ORDER — PROMETHAZINE HYDROCHLORIDE 25 MG/1
25 SUPPOSITORY RECTAL ONCE AS NEEDED
Status: DISCONTINUED | OUTPATIENT
Start: 2022-03-18 | End: 2022-03-18 | Stop reason: HOSPADM

## 2022-03-18 RX ORDER — ACETAMINOPHEN 500 MG
1000 TABLET ORAL ONCE
Status: COMPLETED | OUTPATIENT
Start: 2022-03-18 | End: 2022-03-18

## 2022-03-18 RX ORDER — OXYCODONE HYDROCHLORIDE 5 MG/1
5 TABLET ORAL
Status: DISCONTINUED | OUTPATIENT
Start: 2022-03-18 | End: 2022-03-18 | Stop reason: HOSPADM

## 2022-03-18 RX ORDER — EPHEDRINE SULFATE 50 MG/ML
INJECTION, SOLUTION INTRAVENOUS AS NEEDED
Status: DISCONTINUED | OUTPATIENT
Start: 2022-03-18 | End: 2022-03-18 | Stop reason: SURG

## 2022-03-18 RX ORDER — PROMETHAZINE HYDROCHLORIDE 12.5 MG/1
25 TABLET ORAL ONCE AS NEEDED
Status: DISCONTINUED | OUTPATIENT
Start: 2022-03-18 | End: 2022-03-18 | Stop reason: HOSPADM

## 2022-03-18 RX ORDER — ONDANSETRON 2 MG/ML
INJECTION INTRAMUSCULAR; INTRAVENOUS AS NEEDED
Status: DISCONTINUED | OUTPATIENT
Start: 2022-03-18 | End: 2022-03-18 | Stop reason: SURG

## 2022-03-18 RX ORDER — DEXAMETHASONE SODIUM PHOSPHATE 4 MG/ML
INJECTION, SOLUTION INTRA-ARTICULAR; INTRALESIONAL; INTRAMUSCULAR; INTRAVENOUS; SOFT TISSUE AS NEEDED
Status: DISCONTINUED | OUTPATIENT
Start: 2022-03-18 | End: 2022-03-18 | Stop reason: SURG

## 2022-03-18 RX ORDER — SUCCINYLCHOLINE/SOD CL,ISO/PF 100 MG/5ML
SYRINGE (ML) INTRAVENOUS AS NEEDED
Status: DISCONTINUED | OUTPATIENT
Start: 2022-03-18 | End: 2022-03-18 | Stop reason: SURG

## 2022-03-18 RX ORDER — CEFAZOLIN SODIUM IN 0.9 % NACL 3 G/100 ML
3 INTRAVENOUS SOLUTION, PIGGYBACK (ML) INTRAVENOUS ONCE
Status: COMPLETED | OUTPATIENT
Start: 2022-03-18 | End: 2022-03-18

## 2022-03-18 RX ORDER — SODIUM CHLORIDE, SODIUM LACTATE, POTASSIUM CHLORIDE, CALCIUM CHLORIDE 600; 310; 30; 20 MG/100ML; MG/100ML; MG/100ML; MG/100ML
9 INJECTION, SOLUTION INTRAVENOUS CONTINUOUS PRN
Status: DISCONTINUED | OUTPATIENT
Start: 2022-03-18 | End: 2022-03-18 | Stop reason: HOSPADM

## 2022-03-18 RX ORDER — DEXMEDETOMIDINE HYDROCHLORIDE 100 UG/ML
INJECTION, SOLUTION INTRAVENOUS AS NEEDED
Status: DISCONTINUED | OUTPATIENT
Start: 2022-03-18 | End: 2022-03-18 | Stop reason: SURG

## 2022-03-18 RX ADMIN — PROPOFOL 200 MG: 10 INJECTION, EMULSION INTRAVENOUS at 12:45

## 2022-03-18 RX ADMIN — FENTANYL CITRATE 100 MCG: 50 INJECTION, SOLUTION INTRAMUSCULAR; INTRAVENOUS at 12:45

## 2022-03-18 RX ADMIN — Medication 3 G: at 13:06

## 2022-03-18 RX ADMIN — DEXAMETHASONE SODIUM PHOSPHATE 4 MG: 4 INJECTION INTRA-ARTICULAR; INTRALESIONAL; INTRAMUSCULAR; INTRAVENOUS; SOFT TISSUE at 13:15

## 2022-03-18 RX ADMIN — Medication 140 MG: at 12:45

## 2022-03-18 RX ADMIN — LIDOCAINE HYDROCHLORIDE 50 MG: 20 INJECTION, SOLUTION INFILTRATION; PERINEURAL at 12:45

## 2022-03-18 RX ADMIN — ONDANSETRON 4 MG: 2 INJECTION INTRAMUSCULAR; INTRAVENOUS at 13:57

## 2022-03-18 RX ADMIN — ROCURONIUM BROMIDE 45 MG: 10 INJECTION INTRAVENOUS at 12:53

## 2022-03-18 RX ADMIN — ONDANSETRON 4 MG: 2 INJECTION INTRAMUSCULAR; INTRAVENOUS at 16:01

## 2022-03-18 RX ADMIN — ACETAMINOPHEN 1000 MG: 500 TABLET ORAL at 12:06

## 2022-03-18 RX ADMIN — DEXMEDETOMIDINE HYDROCHLORIDE 10 MCG: 100 INJECTION, SOLUTION, CONCENTRATE INTRAVENOUS at 13:47

## 2022-03-18 RX ADMIN — KETOROLAC TROMETHAMINE 30 MG: 30 INJECTION, SOLUTION INTRAMUSCULAR; INTRAVENOUS at 13:58

## 2022-03-18 RX ADMIN — DEXMEDETOMIDINE HYDROCHLORIDE 10 MCG: 100 INJECTION, SOLUTION, CONCENTRATE INTRAVENOUS at 13:52

## 2022-03-18 RX ADMIN — ROCURONIUM BROMIDE 20 MG: 10 INJECTION INTRAVENOUS at 13:11

## 2022-03-18 RX ADMIN — HYDROMORPHONE HYDROCHLORIDE 0.5 MG: 1 INJECTION, SOLUTION INTRAMUSCULAR; INTRAVENOUS; SUBCUTANEOUS at 15:16

## 2022-03-18 RX ADMIN — HYDROMORPHONE HYDROCHLORIDE 1 MG: 2 INJECTION, SOLUTION INTRAMUSCULAR; INTRAVENOUS; SUBCUTANEOUS at 13:25

## 2022-03-18 RX ADMIN — DEXMEDETOMIDINE HYDROCHLORIDE 10 MCG: 100 INJECTION, SOLUTION, CONCENTRATE INTRAVENOUS at 13:56

## 2022-03-18 RX ADMIN — MIDAZOLAM HYDROCHLORIDE 2 MG: 1 INJECTION, SOLUTION INTRAMUSCULAR; INTRAVENOUS at 12:08

## 2022-03-18 RX ADMIN — ROCURONIUM BROMIDE 5 MG: 10 INJECTION INTRAVENOUS at 12:45

## 2022-03-18 RX ADMIN — EPHEDRINE SULFATE 10 MG: 50 INJECTION INTRAVENOUS at 13:05

## 2022-03-18 RX ADMIN — SODIUM CHLORIDE, POTASSIUM CHLORIDE, SODIUM LACTATE AND CALCIUM CHLORIDE 9 ML/HR: 600; 310; 30; 20 INJECTION, SOLUTION INTRAVENOUS at 12:06

## 2022-03-18 NOTE — DISCHARGE INSTRUCTIONS
DISCHARGE INSTRUCTIONS  ORTHOPEDICS      For your surgery you had:  General anesthesia (you may have a sore throat for the first 24 hours)  IV sedation.  Local anesthesia  Monitored anesthesia care  You received a medicated patch for nausea prevention today (behind the ear). It is recommended that you remove it 24-48 hours post-operatively. It must be removed within 72 hours.   You received an anesthesia medication today that can cause hormonal forms of birth control to be ineffective. You should use a different form of birth control (to prevent pregnancy) for 7 days.   You may experience dizziness, drowsiness, or light-headedness for several hours following surgery  Do not stay alone today or tonight.  Limit your activity for 24 hours.  Resume your diet slowly.  Follow whatever special dietary instructions you may have been given by the doctor.  You should not drive or operate machinery or drink alcohol for 24 hours or while you are taking pain medication.  You should not sign any legally binding documents.  If you had an axillary or regional block, you will not have control of the involved limb for up to 12 hours.  Protect the arm with a sling or follow your physician's specific instructions.  You may remove dressing:  [] in 24 hours  [] in 48 hours  [x] Other: Do not touch dressing.  You may shower   keep dressing dry.  Sleep with the injured part elevated on a pillow.  Medications per physician's instructions as indicated on Discharge Medication Information Sheet.  Follow verbal instructions of your doctor.  Sit with the lower leg propped up on a footstool or chair with pillows.  Exercise toes for 10 minutes every hour while awake. Ice bag to injured area for 72 hours.  Apply 20 minutes on - 20 minutes off.  Never place ice directly on skin or cast.    Avoid getting cast or dressing wet.  In addition to these instructions, follow the discharge instructions on postoperative arthroscopic surgery.    SPECIAL  INSTRUCTIONS:             Last dose of pain medication was given at:        NOTIFY THE PHYSICIAN IF YOU EXPERIENCE:  Numbness of fingers or toes.  Inability to move fingers or toes.  Extreme coldness, paleness or blue dis-coloration of fingers or toes.  Excessive swelling of affected surgical site or swelling that causes the cast to rub or cut into skin.  Pain unrelieved by pain medication  Nausea/vomiting not relieved by prescribed medication  Unable to urinate in 6 hours after surgery  Temperature greater than 101 degree Fahrenheit or chills  If unable to reach your doctor, please go to the closest emergency room

## 2022-03-18 NOTE — ANESTHESIA PREPROCEDURE EVALUATION
Anesthesia Evaluation     Patient summary reviewed and Nursing notes reviewed   no history of anesthetic complications:  NPO Solid Status: > 8 hours  NPO Liquid Status: > 2 hours           Airway   Mallampati: II  TM distance: >3 FB  Neck ROM: full  No difficulty expected  Dental      Pulmonary - negative pulmonary ROS and normal exam    breath sounds clear to auscultation  Cardiovascular - normal exam  Exercise tolerance: good (4-7 METS)    Rhythm: regular  Rate: normal    (+) hypertension,       Neuro/Psych- negative ROS  GI/Hepatic/Renal/Endo - negative ROS     Musculoskeletal (-) negative ROS    Abdominal    Substance History - negative use     OB/GYN negative ob/gyn ROS         Other - negative ROS                       Anesthesia Plan    ASA 2     general   (Patient understands anesthesia not responsible for dental damage.)  intravenous induction     Anesthetic plan, all risks, benefits, and alternatives have been provided, discussed and informed consent has been obtained with: patient.  Use of blood products discussed with patient .   Plan discussed with CRNA.        CODE STATUS:

## 2022-03-18 NOTE — ANESTHESIA POSTPROCEDURE EVALUATION
Patient: Mere Ivory    Procedure Summary     Date: 03/18/22 Room / Location: [facility] OR 05 / [facility] MAIN OR    Anesthesia Start: 1243 Anesthesia Stop: 1435    Procedure: ACHILLES TENDON REPAIR, BONE SPUR EXCISION (Right Ankle) Diagnosis:       Achilles tendon rupture, right, subsequent encounter      (Achilles tendon rupture, right, subsequent encounter [S86.011D])    Surgeons: Sergei Russ DPM Provider: Aleksey Yu MD    Anesthesia Type: general ASA Status: 2          Anesthesia Type: general    Vitals  Vitals Value Taken Time   /83 03/18/22 1519   Temp 36.1 °C (97 °F) 03/18/22 1505   Pulse 73 03/18/22 1523   Resp 16 03/18/22 1510   SpO2 92 % 03/18/22 1523   Vitals shown include unvalidated device data.        Post Anesthesia Care and Evaluation    Patient location during evaluation: bedside  Patient participation: complete - patient participated  Level of consciousness: awake  Pain management: adequate  Airway patency: patent  Anesthetic complications: No anesthetic complications  PONV Status: none  Cardiovascular status: acceptable and stable  Respiratory status: acceptable  Hydration status: acceptable    Comments: An Anesthesiologist personally participated in the most demanding procedures (including induction and emergence if applicable) in the anesthesia plan, monitored the course of anesthesia administration at frequent intervals and remained physically present and available for immediate diagnosis and treatment of emergencies.

## 2022-03-18 NOTE — OP NOTE
"PREOPERATIVE DIAGNOSES:  1.  Right calcaneal posterior spur  2.  Achilles Tendon Rupture     POSTOPERATIVE DIAGNOSES:  1.  Right calcaneal posterior spur   2.  Achilles Tendon Rupture     NAME OF OPERATION:  1.  Achilles tendon rupture repair (CPT: 32338).  2.  Cannula spur resection (CPT: 60424).  3.  Posterior below knee cast (CPT:  43434).     SURGEON:  Sergei Russ DPM.     ASSISTANT:  None.     ANESTHESIA:  General.     HEMOSTASIS:  Well-padded Pneumatic thigh tourniquet @ 350 mmHg x 60 minutes.     ESTIMATED BLOOD LOSS:  1ml     SPECIMEN:  None.     DRAINS:  None.     COMPLICATIONS:  None.     PREOP MEDS:  3 gram Ancef was given via IVPB 30\" prior to cuff inflation.     IMPLANTS:  Arthrex PARS system     INJECTABLES:  13cc    0.25% Marcaine Plain     SUTURES:  Vicryl 4-0.  Nylon 3-0     PROCEDURE:  Written consent was obtained from the patient prior to administration of any medication or sedation.     Under mild sedation the patient was brought into the operating room and placed on the operating table in the supine position.  A well-padded pneumatic tourniquet was placed about the thigh of the patient's surgical limb.  Following general anesthesia, the patient was placed in prone position with all kyra prominences and soft tissue protected and monitored throughout the surgical case by the anesthesia department.  The surgical foot and lower leg were scrubbed, prepped, and draped in the usual aseptic manner. An Esmarch bandage was utilized to exsanguinate the patient's surgical foot and leg and the pneumatic thigh tourniquet was inflated to a pressure of 350 mmHg.    Attention was directed the posterior aspect of the watershed area of the Achilles tendon of the right foot.  A palpable deficit was palpated and seen.  A linear incision was made proximal aspect of this deficit.     The incision was deepened through the subcutaneous tissues using sharp and blunt dissection.  Care was taken to identify and " "retract all vital neuro and vascular structures.  All bleeders were cauterized and ligated as necessary.     The incision was deepened to the level of the Achilles tendon rupture area.    With use of Arthrex PARS system, the proximal aspect of the Achilles tendon was secured with FiberWire suture tape.    Attention then directed to the calcaneus where incisions were made on the medial and lateral aspects of the Achilles tendon insertion area.  Incisions were debrided subcutaneous tissues using sharp and blunt dissection care was taken down to protect vital neurovascular structures all bleeders were ligated and cauterized as necessary.  The lateral incision was utilized to resect the bony prominence that was present.  This was performed utilizing a rongeur and a hand rasp.    Next, Arthrex swivel locks were placed in the medial lateral aspects of the calcaneus calcaneus.  Suture was passed from the proximal aspect of the Achilles tendon rupture sites distally.  The sutures were secured in the bone anchors on the medial aspect of the calcaneus.  Upon securing the sutures, the previous rupture edges of the Achilles tendon were reapproximated.    Incision sites were flushed with copious amounts of sterile normal saline.    Peritenon was reapproximated coapted utilizing 4-0 Vicryl.    Subcutaneous tissues reapproximated coapted utilizing 4-0 Vicryl.  Skin edges reapproximated coapted utilizing 3-0 nylon suture.     The thigh tourniquet was deflated with prompt, hyperemic response was seen to the surgical site and all toes of the surgical foot with a total tourniquet time of 60 minutes.     Upon completion of the procedure, a postoperative block with 0.25% Marcaine plain was injected proximal to the surgical site.     The incision was dressed with Adaptic dressing and covered with a sterile, compressive dressing consisting of 4\" x 4\", Kerlix, and Coban.     A below-knee posterior splint cast was applied with a cast " padding, Ortho-Glass, and an ACE wrap with the foot being held rectus in relation to the anterior leg.     The patient tolerated the procedure and anesthesia well.  The patient was transferred to the recovery room with vital signs stable and vascular status intact to all toes of the surgical foot.     Following a period of postoperative monitoring, the patient will be discharged home having been given written prescriptions along with written and oral postoperative instructions.    The surgery was performed with Yg Krishnamurthy RN, First Assist.  He performed as a first assist throughout the entire case with retracting, fixation, suturing, and postoperative dressing.     The patient is to contact Dr. Russ for all postoperative follow-up care and if any problems arise.

## 2022-03-22 ENCOUNTER — OFFICE VISIT (OUTPATIENT)
Dept: PODIATRY | Facility: CLINIC | Age: 47
End: 2022-03-22

## 2022-03-22 VITALS
BODY MASS INDEX: 45.99 KG/M2 | SYSTOLIC BLOOD PRESSURE: 120 MMHG | HEART RATE: 93 BPM | WEIGHT: 293 LBS | TEMPERATURE: 96.7 F | HEIGHT: 67 IN | DIASTOLIC BLOOD PRESSURE: 79 MMHG | OXYGEN SATURATION: 98 %

## 2022-03-22 DIAGNOSIS — S86.011D ACHILLES TENDON RUPTURE, RIGHT, SUBSEQUENT ENCOUNTER: Primary | ICD-10-CM

## 2022-03-22 DIAGNOSIS — M79.671 FOOT PAIN, RIGHT: ICD-10-CM

## 2022-03-22 PROCEDURE — 29405 APPL SHORT LEG CAST: CPT | Performed by: PODIATRIST

## 2022-03-22 PROCEDURE — 99024 POSTOP FOLLOW-UP VISIT: CPT | Performed by: PODIATRIST

## 2022-03-22 RX ORDER — HYDROCODONE BITARTRATE AND ACETAMINOPHEN 5; 325 MG/1; MG/1
1-2 TABLET ORAL EVERY 4 HOURS PRN
Qty: 30 TABLET | Refills: 0 | Status: SHIPPED | OUTPATIENT
Start: 2022-03-22 | End: 2023-01-26

## 2022-03-22 NOTE — PROGRESS NOTES
[facility] - PODIATRY    Today's Date: 03/22/22    Patient Name: Mere Ivory  MRN: [id number]  CSN: [id number]  PCP: Bala Thompson APRN  Referring Provider: No ref. provider found    SUBJECTIVE     Chief Complaint   Patient presents with   • Right Foot - Post-op Follow-up     HPI: Mere Ivory, a 46 y.o.female, presents to clinic.    Procedure: Right Achilles tendon rupture repair  Date: 18 March 2022    Patient states they are doing well without complications.  Patient states they are following post-op instructions.  Patient states pain is controlled.      Patient denies any fevers, chills, nausea, vomiting, shortness of breath, nor any other constitutional signs nor symptoms.      No other pedal complaints at this time.    Recent medical changes: None    Past Medical History:   Diagnosis Date   • Achilles tendon rupture, right, subsequent encounter    • Ankle pain, right    • Arthritis    • HTN (hypertension)      Past Surgical History:   Procedure Laterality Date   • ACHILLES TENDON SURGERY Right 3/18/2022    Procedure: ACHILLES TENDON REPAIR, BONE SPUR EXCISION;  Surgeon: Sergei Russ DPM;  Location: [facility] OR;  Service: Podiatry;  Laterality: Right;   • LASIK  2011     Family History   Problem Relation Age of Onset   • Diabetes Mother    • Hypertension Mother    • Stroke Neg Hx      Social History     Socioeconomic History   • Marital status:    Tobacco Use   • Smoking status: Former Smoker     Packs/day: 0.00     Years: 0.00     Pack years: 0.00     Quit date: 1/1/2012     Years since quitting: 10.2   • Smokeless tobacco: Never Used   • Tobacco comment: QUIT 2012 WAS A SOCIAL SMOKER FOR 18 YEARS   Vaping Use   • Vaping Use: Never used   Substance and Sexual Activity   • Alcohol use: Yes     Comment: RARELY   • Drug use: Never   • Sexual activity: Defer     No Known Allergies  Current Outpatient Medications   Medication Sig Dispense Refill   •  buPROPion XL (Wellbutrin XL) 300 MG 24 hr tablet Take 1 tablet by mouth Daily. 90 tablet 1   • hydroCHLOROthiazide (HYDRODIURIL) 12.5 MG tablet Take 1 tablet by mouth Daily. (Patient taking differently: Take 12.5 mg by mouth Daily. As needed) 30 tablet 0   • losartan (COZAAR) 100 MG tablet Take 1 tablet by mouth Daily. 90 tablet 1   • promethazine (PHENERGAN) 12.5 MG tablet Take 1 tablet by mouth Every 8 (Eight) Hours As Needed for Nausea or Vomiting. 30 tablet 0   • HYDROcodone-acetaminophen (Norco) 5-325 MG per tablet Take 1-2 tablets by mouth Every 4 (Four) Hours As Needed for Moderate Pain . 30 tablet 0     No current facility-administered medications for this visit.     Review of Systems   Musculoskeletal:        Postop right Achilles tendon repair       OBJECTIVE     Vitals:    03/22/22 1357   BP: 120/79   Pulse: 93   Temp: 96.7 °F (35.9 °C)   SpO2: 98%       Patient seen in no apparent distress.      PHYSICAL EXAM:     Foot/Ankle Exam:       General:   Appearance: obesity    Orientation: AAOx3    Affect: appropriate    Assistance: knee scooter    Shoes: Posterior splint cast on right lower leg.    VASCULAR      Right Foot Vascularity   Normal vascular exam    Dorsalis pedis:  2+  Posterior tibial:  2+  Skin Temperature: warm    Edema Grading:  None  CFT:  < 3 seconds  Pedal Hair Growth:  Present  Varicosities: none       Left Foot Vascularity   Normal vascular exam    Dorsalis pedis:  2+  Posterior tibial:  2+  Skin Temperature: warm    Edema Grading:  None  CFT:  < 3 seconds  Pedal Hair Growth:  Present  Varicosities: none        NEUROLOGIC     Right Foot Neurologic   Normal sensation    Light touch sensation:  Normal  Vibratory sensation:  Normal  Hot/Cold sensation: normal       Left Foot Neurologic   Normal sensation    Light touch sensation:  Normal  Vibratory sensation:  Normal  Hot/cold sensation: normal        Right Foot Additional Comments Right foot shows posterior splint and dressing are dry and  intact without signs of breakthrough.  Posterior Achilles tendon insertion area shows sutures intact with skin edges well-coapted with no signs of dehiscence.  Healthy surgical skin edges.  No drainage present.  No edema, erythema, calor, lymphangitis, nor signs of infection seen.        RADIOLOGY:      XR Foot 2 View Right    Result Date: 3/22/2022  Narrative: IN-OFFICE IMAGIN view, AP, MO, Lateral, right foot  Indication: Post-operative Findings: Reattachment of Achilles tendon is seen in the soft tissue outline.  Resection calcaneal spur seen. Comparison: No other changes seen compared to previous views.     ASSESSMENT/PLAN     Diagnoses and all orders for this visit:    1. Achilles tendon rupture, right, subsequent encounter (Primary)  -     HYDROcodone-acetaminophen (Norco) 5-325 MG per tablet; Take 1-2 tablets by mouth Every 4 (Four) Hours As Needed for Moderate Pain .  Dispense: 30 tablet; Refill: 0    2. Foot pain, right    Extremity:  Right leg, Short Leg cast.    Patient was placed in fiberglass cast today. The patient tolerated the procedure without any complications., Neurovascular status was evaluated post cast application and was within normal limits. The cast was not causing impingement on neuro-vasculature or vital structures.    Comprehensive lower extremity examination and evaluation was performed.    Discussed findings and treatment plan including risks, benefits, and treatment options with patient in detail. Patient agreed with treatment plan.    An After Visit Summary was printed and given to the patient at discharge, including (if requested) any available informative/educational handouts regarding diagnosis, treatment, or medications. All questions were answered to patient/family satisfaction. Should symptoms fail to improve or worsen they agree to call or return to clinic or to go to the Emergency Department. Discussed the importance of following up with any needed screening  tests/labs/specialist appointments and any requested follow-up recommended by me today. Importance of maintaining follow-up discussed and patient accepts that missed appointments can delay diagnosis and potentially lead to worsening of conditions.    Return in about 2 weeks (around 4/5/2022) for Post Operative: Application of second cast., or sooner if acute issues arise.    This document has been electronically signed by Sergei Russ DPM on March 22, 2022 14:32 EDT

## 2022-04-05 ENCOUNTER — OFFICE VISIT (OUTPATIENT)
Dept: PODIATRY | Facility: CLINIC | Age: 47
End: 2022-04-05

## 2022-04-05 VITALS
HEIGHT: 67 IN | SYSTOLIC BLOOD PRESSURE: 145 MMHG | TEMPERATURE: 97.5 F | DIASTOLIC BLOOD PRESSURE: 84 MMHG | WEIGHT: 293 LBS | BODY MASS INDEX: 45.99 KG/M2 | OXYGEN SATURATION: 100 % | HEART RATE: 84 BPM

## 2022-04-05 DIAGNOSIS — S86.011D ACHILLES TENDON RUPTURE, RIGHT, SUBSEQUENT ENCOUNTER: Primary | ICD-10-CM

## 2022-04-05 DIAGNOSIS — M79.671 FOOT PAIN, RIGHT: ICD-10-CM

## 2022-04-05 PROCEDURE — 29405 APPL SHORT LEG CAST: CPT | Performed by: PODIATRIST

## 2022-04-05 PROCEDURE — 99024 POSTOP FOLLOW-UP VISIT: CPT | Performed by: PODIATRIST

## 2022-04-05 NOTE — PROGRESS NOTES
[facility] - PODIATRY    Today's Date: 04/05/22    Patient Name: Mere Ivory  MRN: [id number]  CSN: [id number]  PCP: Bala Thompson APRN  Referring Provider: No ref. provider found    SUBJECTIVE     Chief Complaint   Patient presents with   • Right Foot - Post-op Follow-up     Cast change     HPI: Mere Ivory, a 46 y.o.female, presents to clinic.    Procedure: Right Achilles tendon rupture repair  Date: 18 March 2022    Patient states they are doing well without complications.  Patient states they are following post-op instructions.  Patient states pain is controlled.      Patient denies any fevers, chills, nausea, vomiting, shortness of breath, nor any other constitutional signs nor symptoms.      Patient states being pleased with postop results thus far.    Past Medical History:   Diagnosis Date   • Achilles tendon rupture, right, subsequent encounter    • Ankle pain, right    • Arthritis    • HTN (hypertension)      Past Surgical History:   Procedure Laterality Date   • ACHILLES TENDON SURGERY Right 3/18/2022    Procedure: ACHILLES TENDON REPAIR, BONE SPUR EXCISION;  Surgeon: Sergei Russ DPM;  Location: [facility];  Service: Podiatry;  Laterality: Right;   • LASIK  2011     Family History   Problem Relation Age of Onset   • Diabetes Mother    • Hypertension Mother    • Stroke Neg Hx      Social History     Socioeconomic History   • Marital status:    Tobacco Use   • Smoking status: Former Smoker     Packs/day: 0.00     Years: 0.00     Pack years: 0.00     Quit date: 1/1/2012     Years since quitting: 10.2   • Smokeless tobacco: Never Used   • Tobacco comment: QUIT 2012 WAS A SOCIAL SMOKER FOR 18 YEARS   Vaping Use   • Vaping Use: Never used   Substance and Sexual Activity   • Alcohol use: Yes     Comment: RARELY   • Drug use: Never   • Sexual activity: Defer     No Known Allergies  Current Outpatient Medications   Medication Sig Dispense Refill   •  buPROPion XL (Wellbutrin XL) 300 MG 24 hr tablet Take 1 tablet by mouth Daily. 90 tablet 1   • hydroCHLOROthiazide (HYDRODIURIL) 12.5 MG tablet Take 1 tablet by mouth Daily. (Patient taking differently: Take 12.5 mg by mouth Daily. As needed) 30 tablet 0   • HYDROcodone-acetaminophen (Norco) 5-325 MG per tablet Take 1-2 tablets by mouth Every 4 (Four) Hours As Needed for Moderate Pain . 30 tablet 0   • losartan (COZAAR) 100 MG tablet Take 1 tablet by mouth Daily. 90 tablet 1   • promethazine (PHENERGAN) 12.5 MG tablet Take 1 tablet by mouth Every 8 (Eight) Hours As Needed for Nausea or Vomiting. 30 tablet 0     No current facility-administered medications for this visit.     Review of Systems   Musculoskeletal:        Postop right Achilles tendon repair       OBJECTIVE     Vitals:    04/05/22 1504   BP: 145/84   Pulse: 84   Temp: 97.5 °F (36.4 °C)   SpO2: 100%       Patient seen in no apparent distress.      PHYSICAL EXAM:     Foot/Ankle Exam:       General:   Appearance: obesity    Orientation: AAOx3    Affect: appropriate    Assistance: knee scooter    Shoes: Below-knee fiberglass cast on right lower leg.    VASCULAR      Right Foot Vascularity   Normal vascular exam    Dorsalis pedis:  2+  Posterior tibial:  2+  Skin Temperature: warm    Edema Grading:  None  CFT:  < 3 seconds  Pedal Hair Growth:  Present  Varicosities: none       Left Foot Vascularity   Normal vascular exam    Dorsalis pedis:  2+  Posterior tibial:  2+  Skin Temperature: warm    Edema Grading:  None  CFT:  < 3 seconds  Pedal Hair Growth:  Present  Varicosities: none        NEUROLOGIC     Right Foot Neurologic   Normal sensation    Light touch sensation:  Normal  Vibratory sensation:  Normal  Hot/Cold sensation: normal       Left Foot Neurologic   Normal sensation    Light touch sensation:  Normal  Vibratory sensation:  Normal  Hot/cold sensation: normal        Right Foot Additional Comments Right foot shows below-knee fiberglass cast and  dressing are dry and intact without signs of breakthrough.  Posterior Achilles tendon insertion area shows sutures intact with skin edges well-coapted with no signs of dehiscence.  Healthy surgical skin edges.  No drainage present.  No edema, erythema, calor, lymphangitis, nor signs of infection seen.        ASSESSMENT/PLAN     Diagnoses and all orders for this visit:    1. Achilles tendon rupture, right, subsequent encounter (Primary)    2. Foot pain, right    Extremity:  Right leg, Short Leg cast.    Patient was placed in fiberglass cast today. The patient tolerated the procedure without any complications., Neurovascular status was evaluated post cast application and was within normal limits. The cast was not causing impingement on neuro-vasculature or vital structures.    Comprehensive lower extremity examination and evaluation was performed.    Discussed findings and treatment plan including risks, benefits, and treatment options with patient in detail. Patient agreed with treatment plan.    An After Visit Summary was printed and given to the patient at discharge, including (if requested) any available informative/educational handouts regarding diagnosis, treatment, or medications. All questions were answered to patient/family satisfaction. Should symptoms fail to improve or worsen they agree to call or return to clinic or to go to the Emergency Department. Discussed the importance of following up with any needed screening tests/labs/specialist appointments and any requested follow-up recommended by me today. Importance of maintaining follow-up discussed and patient accepts that missed appointments can delay diagnosis and potentially lead to worsening of conditions.    Return in about 2 weeks (around 4/19/2022) for Post Operative: Suture removal, application of third cast., or sooner if acute issues arise.    This document has been electronically signed by Sergei Russ DPM on April 5, 2022 15:40 EDT

## 2022-04-15 ENCOUNTER — OFFICE VISIT (OUTPATIENT)
Dept: PODIATRY | Facility: CLINIC | Age: 47
End: 2022-04-15

## 2022-04-15 VITALS
OXYGEN SATURATION: 100 % | SYSTOLIC BLOOD PRESSURE: 140 MMHG | HEART RATE: 88 BPM | BODY MASS INDEX: 45.99 KG/M2 | HEIGHT: 67 IN | TEMPERATURE: 96 F | WEIGHT: 293 LBS | DIASTOLIC BLOOD PRESSURE: 79 MMHG

## 2022-04-15 DIAGNOSIS — S86.011D ACHILLES TENDON RUPTURE, RIGHT, SUBSEQUENT ENCOUNTER: ICD-10-CM

## 2022-04-15 DIAGNOSIS — M79.671 FOOT PAIN, RIGHT: Primary | ICD-10-CM

## 2022-04-15 PROCEDURE — 29405 APPL SHORT LEG CAST: CPT | Performed by: PODIATRIST

## 2022-04-15 PROCEDURE — 99024 POSTOP FOLLOW-UP VISIT: CPT | Performed by: PODIATRIST

## 2022-04-15 NOTE — PROGRESS NOTES
[facility] - PODIATRY    Today's Date: 04/15/22    Patient Name: Mere Ivory  MRN: [id number]  CSN: [id number]  PCP: Bala Thompson APRN  Referring Provider: No ref. provider found    SUBJECTIVE     Chief Complaint   Patient presents with   • Right Foot - Post-op Follow-up     Cast change (3rd cast)     HPI: Mere Ivory, a 46 y.o.female, presents to clinic.    Procedure: Right Achilles tendon rupture repair  Date: 18 March 2022    Patient states they are doing well without complications.  Patient states they are following post-op instructions.  Patient states pain is controlled.      Patient denies any fevers, chills, nausea, vomiting, shortness of breath, nor any other constitutional signs nor symptoms.      Patient states being pleased with postop results thus far.    Past Medical History:   Diagnosis Date   • Achilles tendon rupture, right, subsequent encounter    • Ankle pain, right    • Arthritis    • HTN (hypertension)      Past Surgical History:   Procedure Laterality Date   • ACHILLES TENDON SURGERY Right 3/18/2022    Procedure: ACHILLES TENDON REPAIR, BONE SPUR EXCISION;  Surgeon: Sergei Russ DPM;  Location: [facility] OR;  Service: Podiatry;  Laterality: Right;   • LASIK  2011     Family History   Problem Relation Age of Onset   • Diabetes Mother    • Hypertension Mother    • Stroke Neg Hx      Social History     Socioeconomic History   • Marital status:    Tobacco Use   • Smoking status: Former Smoker     Packs/day: 0.00     Years: 0.00     Pack years: 0.00     Quit date: 1/1/2012     Years since quitting: 10.2   • Smokeless tobacco: Never Used   • Tobacco comment: QUIT 2012 WAS A SOCIAL SMOKER FOR 18 YEARS   Vaping Use   • Vaping Use: Never used   Substance and Sexual Activity   • Alcohol use: Yes     Comment: RARELY   • Drug use: Never   • Sexual activity: Defer     No Known Allergies  Current Outpatient Medications   Medication Sig Dispense  Refill   • buPROPion XL (Wellbutrin XL) 300 MG 24 hr tablet Take 1 tablet by mouth Daily. 90 tablet 1   • HYDROcodone-acetaminophen (Norco) 5-325 MG per tablet Take 1-2 tablets by mouth Every 4 (Four) Hours As Needed for Moderate Pain . 30 tablet 0   • losartan (COZAAR) 100 MG tablet Take 1 tablet by mouth Daily. 90 tablet 1     No current facility-administered medications for this visit.     Review of Systems   Musculoskeletal:        Postop right Achilles tendon repair       OBJECTIVE     Vitals:    04/15/22 0745   BP: 140/79   Pulse: 88   Temp: 96 °F (35.6 °C)   SpO2: 100%       Patient seen in no apparent distress.      PHYSICAL EXAM:     Foot/Ankle Exam:       General:   Appearance: obesity    Orientation: AAOx3    Affect: appropriate    Assistance: knee scooter    Shoes: Below-knee fiberglass cast on right lower leg.    VASCULAR      Right Foot Vascularity   Normal vascular exam    Dorsalis pedis:  2+  Posterior tibial:  2+  Skin Temperature: warm    Edema Grading:  None  CFT:  < 3 seconds  Pedal Hair Growth:  Present  Varicosities: none       Left Foot Vascularity   Normal vascular exam    Dorsalis pedis:  2+  Posterior tibial:  2+  Skin Temperature: warm    Edema Grading:  None  CFT:  < 3 seconds  Pedal Hair Growth:  Present  Varicosities: none        NEUROLOGIC     Right Foot Neurologic   Normal sensation    Light touch sensation:  Normal  Vibratory sensation:  Normal  Hot/Cold sensation: normal       Left Foot Neurologic   Normal sensation    Light touch sensation:  Normal  Vibratory sensation:  Normal  Hot/cold sensation: normal        Right Foot Additional Comments Right foot shows below-knee fiberglass cast and dressing are dry and intact without signs of breakthrough.  Posterior Achilles tendon insertion area shows sutures intact with skin edges well-coapted with no signs of dehiscence.  Healthy surgical skin edges.  No drainage present.  No edema, erythema, calor, lymphangitis, nor signs of infection  seen.    No pressure lesions are seen upon removal of cast.    Upon removal of sutures, skin edges remain well-coapted with no signs of dehiscence.        ASSESSMENT/PLAN     Diagnoses and all orders for this visit:    1. Foot pain, right (Primary)    2. Achilles tendon rupture, right, subsequent encounter    Extremity:  Right leg, Short Leg cast.    Patient was placed in fiberglass cast today. The patient tolerated the procedure without any complications., Neurovascular status was evaluated post cast application and was within normal limits. The cast was not causing impingement on neuro-vasculature or vital structures.    Comprehensive lower extremity examination and evaluation was performed.    Discussed findings and treatment plan including risks, benefits, and treatment options with patient in detail. Patient agreed with treatment plan.    An After Visit Summary was printed and given to the patient at discharge, including (if requested) any available informative/educational handouts regarding diagnosis, treatment, or medications. All questions were answered to patient/family satisfaction. Should symptoms fail to improve or worsen they agree to call or return to clinic or to go to the Emergency Department. Discussed the importance of following up with any needed screening tests/labs/specialist appointments and any requested follow-up recommended by me today. Importance of maintaining follow-up discussed and patient accepts that missed appointments can delay diagnosis and potentially lead to worsening of conditions.    Return in about 2 weeks (around 4/29/2022) for Post Operative: cast removal, begin PWB w/CAM walker., or sooner if acute issues arise.    This document has been electronically signed by Sergei Russ DPM on April 15, 2022 08:21 EDT

## 2022-05-02 ENCOUNTER — OFFICE VISIT (OUTPATIENT)
Dept: PODIATRY | Facility: CLINIC | Age: 47
End: 2022-05-02

## 2022-05-02 VITALS
HEART RATE: 85 BPM | SYSTOLIC BLOOD PRESSURE: 132 MMHG | BODY MASS INDEX: 45.99 KG/M2 | DIASTOLIC BLOOD PRESSURE: 70 MMHG | HEIGHT: 67 IN | OXYGEN SATURATION: 99 % | WEIGHT: 293 LBS | TEMPERATURE: 96.9 F

## 2022-05-02 DIAGNOSIS — M79.671 FOOT PAIN, RIGHT: Primary | ICD-10-CM

## 2022-05-02 DIAGNOSIS — S86.011D ACHILLES TENDON RUPTURE, RIGHT, SUBSEQUENT ENCOUNTER: ICD-10-CM

## 2022-05-02 DIAGNOSIS — M76.61 ACHILLES TENDINITIS OF RIGHT LOWER EXTREMITY: ICD-10-CM

## 2022-05-02 PROCEDURE — 99024 POSTOP FOLLOW-UP VISIT: CPT | Performed by: PODIATRIST

## 2022-05-02 NOTE — PROGRESS NOTES
[facility] - PODIATRY    Today's Date: 05/02/22    Patient Name: Mere Ivory  MRN: [id number]  CSN: [id number]  PCP: Bala Thompson APRN  Referring Provider: No ref. provider found    SUBJECTIVE     Chief Complaint   Patient presents with   • Right Foot - Postop (global period)     HPI: Mere Ivory, a 46 y.o.female, presents to clinic.    Procedure: Right Achilles tendon rupture repair  Date: 18 March 2022    Patient states they are doing well without complications.  Patient states they are following post-op instructions.  Patient states pain is controlled.      Patient denies any fevers, chills, nausea, vomiting, shortness of breath, nor any other constitutional signs nor symptoms.      Patient states being pleased with postop results thus far.    No medical changes since her last visit.    Past Medical History:   Diagnosis Date   • Achilles tendon rupture, right, subsequent encounter    • Ankle pain, right    • Arthritis    • HTN (hypertension)      Past Surgical History:   Procedure Laterality Date   • ACHILLES TENDON SURGERY Right 3/18/2022    Procedure: ACHILLES TENDON REPAIR, BONE SPUR EXCISION;  Surgeon: Sergei Russ DPM;  Location: [facility] OR;  Service: Podiatry;  Laterality: Right;   • LASIK  2011     Family History   Problem Relation Age of Onset   • Diabetes Mother    • Hypertension Mother    • Stroke Neg Hx      Social History     Socioeconomic History   • Marital status:    Tobacco Use   • Smoking status: Former Smoker     Packs/day: 0.00     Years: 0.00     Pack years: 0.00     Quit date: 1/1/2012     Years since quitting: 10.3   • Smokeless tobacco: Never Used   • Tobacco comment: QUIT 2012 WAS A SOCIAL SMOKER FOR 18 YEARS   Vaping Use   • Vaping Use: Never used   Substance and Sexual Activity   • Alcohol use: Yes     Comment: RARELY   • Drug use: Never   • Sexual activity: Defer     No Known Allergies  Current Outpatient Medications    Medication Sig Dispense Refill   • buPROPion XL (Wellbutrin XL) 300 MG 24 hr tablet Take 1 tablet by mouth Daily. 90 tablet 1   • HYDROcodone-acetaminophen (Norco) 5-325 MG per tablet Take 1-2 tablets by mouth Every 4 (Four) Hours As Needed for Moderate Pain . 30 tablet 0   • losartan (COZAAR) 100 MG tablet Take 1 tablet by mouth Daily. 90 tablet 1     No current facility-administered medications for this visit.     Review of Systems   Musculoskeletal:        Postop right Achilles tendon repair       OBJECTIVE     Vitals:    05/02/22 0929   BP: 132/70   Pulse: 85   Temp: 96.9 °F (36.1 °C)   SpO2: 99%       Patient seen in no apparent distress.      PHYSICAL EXAM:     Foot/Ankle Exam:       General:   Appearance: obesity    Orientation: AAOx3    Affect: appropriate    Assistance: knee scooter    Shoes: Below-knee fiberglass cast on right lower leg.    VASCULAR      Right Foot Vascularity   Normal vascular exam    Dorsalis pedis:  2+  Posterior tibial:  2+  Skin Temperature: warm    Edema Grading:  None  CFT:  < 3 seconds  Pedal Hair Growth:  Present  Varicosities: none       Left Foot Vascularity   Normal vascular exam    Dorsalis pedis:  2+  Posterior tibial:  2+  Skin Temperature: warm    Edema Grading:  None  CFT:  < 3 seconds  Pedal Hair Growth:  Present  Varicosities: none        NEUROLOGIC     Right Foot Neurologic   Normal sensation    Light touch sensation:  Normal  Vibratory sensation:  Normal  Hot/Cold sensation: normal       Left Foot Neurologic   Normal sensation    Light touch sensation:  Normal  Vibratory sensation:  Normal  Hot/cold sensation: normal        Right Foot Additional Comments Right foot shows below-knee fiberglass cast and dressing are dry and intact without signs of breakthrough.  Posterior Achilles tendon insertion area shows skin edges well-coapted with no signs of dehiscence.  No hypertrophic skin formation seen.  Healthy surgical skin edges.  No drainage present.  No edema, erythema,  calor, lymphangitis, nor signs of infection seen.    No pressure lesions are seen upon removal of cast.        ASSESSMENT/PLAN     Diagnoses and all orders for this visit:    1. Foot pain, right (Primary)    2. Achilles tendinitis of right lower extremity    3. Achilles tendon rupture, right, subsequent encounter    Extremity:  Right leg, Short Leg cast.    Patient may begin to weight bear as tolerated in supportive shoes.  No impact activities for one month.  After that time, the patient may increase activities as tolerated.  Patient states understanding and agreement with this plan.    Patient is discharged from the surgery.  The patient states understanding and agreement with this plan.    An After Visit Summary was printed and given to the patient at discharge, including (if requested) any available informative/educational handouts regarding diagnosis, treatment, or medications. All questions were answered to patient/family satisfaction. Should symptoms fail to improve or worsen they agree to call or return to clinic or to go to the Emergency Department. Discussed the importance of following up with any needed screening tests/labs/specialist appointments and any requested follow-up recommended by me today. Importance of maintaining follow-up discussed and patient accepts that missed appointments can delay diagnosis and potentially lead to worsening of conditions.    Return if symptoms worsen or fail to improve., or sooner if acute issues arise.    This document has been electronically signed by Sergei Russ DPM on May 2, 2022 09:52 EDT

## 2022-05-27 DIAGNOSIS — F33.1 MODERATE EPISODE OF RECURRENT MAJOR DEPRESSIVE DISORDER: ICD-10-CM

## 2022-05-27 RX ORDER — BUPROPION HYDROCHLORIDE 300 MG/1
TABLET ORAL
Qty: 90 TABLET | Refills: 1 | Status: SHIPPED | OUTPATIENT
Start: 2022-05-27 | End: 2023-01-26

## 2022-06-07 ENCOUNTER — TELEPHONE (OUTPATIENT)
Dept: PODIATRY | Facility: CLINIC | Age: 47
End: 2022-06-07

## 2022-06-07 NOTE — TELEPHONE ENCOUNTER
Caller: KEYUR Downs call back number: [phone]    What form or medical record are you requesting: RETURN TO WORK LETTER    Who is requesting this form or medical record from you: EMPLOYER     How would you like to receive the form or medical records (pick-up, mail, fax): If mail, what is the address: [address] [place] [address]      Timeframe paperwork needed: *    Additional notes: PT IS REQUESTING TO HAVE THAT UPLOADED TO HER [employer]

## 2022-06-10 ENCOUNTER — LAB (OUTPATIENT)
Dept: LAB | Facility: HOSPITAL | Age: 47
End: 2022-06-10

## 2022-06-10 DIAGNOSIS — R31.9 HEMATURIA, UNSPECIFIED TYPE: ICD-10-CM

## 2022-06-10 DIAGNOSIS — I10 PRIMARY HYPERTENSION: ICD-10-CM

## 2022-06-10 LAB
ALBUMIN SERPL-MCNC: 4.2 G/DL (ref 3.5–5.2)
ALBUMIN/GLOB SERPL: 1.3 G/DL
ALP SERPL-CCNC: 57 U/L (ref 39–117)
ALT SERPL W P-5'-P-CCNC: 29 U/L (ref 1–33)
ANION GAP SERPL CALCULATED.3IONS-SCNC: 12 MMOL/L (ref 5–15)
AST SERPL-CCNC: 24 U/L (ref 1–32)
BACTERIA UR QL AUTO: ABNORMAL /HPF
BILIRUB SERPL-MCNC: 1.1 MG/DL (ref 0–1.2)
BILIRUB UR QL STRIP: NEGATIVE
BUN SERPL-MCNC: 8 MG/DL (ref 6–20)
BUN/CREAT SERPL: 13.6 (ref 7–25)
CALCIUM SPEC-SCNC: 9.2 MG/DL (ref 8.6–10.5)
CHLORIDE SERPL-SCNC: 99 MMOL/L (ref 98–107)
CHOLEST SERPL-MCNC: 187 MG/DL (ref 0–200)
CLARITY UR: CLEAR
CO2 SERPL-SCNC: 25 MMOL/L (ref 22–29)
COLOR UR: YELLOW
CREAT SERPL-MCNC: 0.59 MG/DL (ref 0.57–1)
DEPRECATED RDW RBC AUTO: 40.8 FL (ref 37–54)
EGFRCR SERPLBLD CKD-EPI 2021: 111.3 ML/MIN/1.73
ERYTHROCYTE [DISTWIDTH] IN BLOOD BY AUTOMATED COUNT: 13.2 % (ref 12.3–15.4)
GLOBULIN UR ELPH-MCNC: 3.3 GM/DL
GLUCOSE SERPL-MCNC: 88 MG/DL (ref 65–99)
GLUCOSE UR STRIP-MCNC: NEGATIVE MG/DL
HCT VFR BLD AUTO: 39.6 % (ref 34–46.6)
HDLC SERPL QL: 5.34
HDLC SERPL-MCNC: 35 MG/DL (ref 40–60)
HGB BLD-MCNC: 13 G/DL (ref 12–15.9)
HGB UR QL STRIP.AUTO: NEGATIVE
HYALINE CASTS UR QL AUTO: ABNORMAL /LPF
KETONES UR QL STRIP: NEGATIVE
LDLC SERPL CALC-MCNC: 127 MG/DL (ref 0–100)
LEUKOCYTE ESTERASE UR QL STRIP.AUTO: ABNORMAL
MCH RBC QN AUTO: 27.5 PG (ref 26.6–33)
MCHC RBC AUTO-ENTMCNC: 32.8 G/DL (ref 31.5–35.7)
MCV RBC AUTO: 83.7 FL (ref 79–97)
NITRITE UR QL STRIP: NEGATIVE
PH UR STRIP.AUTO: 5.5 [PH] (ref 5–8)
PLATELET # BLD AUTO: 231 10*3/MM3 (ref 140–450)
PMV BLD AUTO: 10.4 FL (ref 6–12)
POTASSIUM SERPL-SCNC: 4.1 MMOL/L (ref 3.5–5.2)
PROT SERPL-MCNC: 7.5 G/DL (ref 6–8.5)
PROT UR QL STRIP: NEGATIVE
RBC # BLD AUTO: 4.73 10*6/MM3 (ref 3.77–5.28)
RBC # UR STRIP: ABNORMAL /HPF
REF LAB TEST METHOD: ABNORMAL
SODIUM SERPL-SCNC: 136 MMOL/L (ref 136–145)
SP GR UR STRIP: 1.01 (ref 1–1.03)
SQUAMOUS #/AREA URNS HPF: ABNORMAL /HPF
TRIGL SERPL-MCNC: 140 MG/DL (ref 0–150)
UROBILINOGEN UR QL STRIP: ABNORMAL
VLDLC SERPL-MCNC: 25 MG/DL (ref 5–40)
WBC # UR STRIP: ABNORMAL /HPF
WBC NRBC COR # BLD: 7.52 10*3/MM3 (ref 3.4–10.8)

## 2022-06-10 PROCEDURE — 85027 COMPLETE CBC AUTOMATED: CPT

## 2022-06-10 PROCEDURE — 80061 LIPID PANEL: CPT

## 2022-06-10 PROCEDURE — 80053 COMPREHEN METABOLIC PANEL: CPT

## 2022-06-10 PROCEDURE — 36415 COLL VENOUS BLD VENIPUNCTURE: CPT

## 2022-06-10 PROCEDURE — 87086 URINE CULTURE/COLONY COUNT: CPT

## 2022-06-10 PROCEDURE — 81001 URINALYSIS AUTO W/SCOPE: CPT

## 2022-06-12 LAB — BACTERIA SPEC AEROBE CULT: NORMAL

## 2022-06-26 DIAGNOSIS — M77.51 BURSITIS OF RIGHT FOOT: ICD-10-CM

## 2022-06-27 RX ORDER — DICLOFENAC SODIUM 75 MG/1
TABLET, DELAYED RELEASE ORAL
Qty: 60 TABLET | Refills: 1 | Status: SHIPPED | OUTPATIENT
Start: 2022-06-27 | End: 2023-02-28

## 2022-09-26 DIAGNOSIS — I10 HYPERTENSION, UNSPECIFIED TYPE: ICD-10-CM

## 2022-09-26 RX ORDER — LOSARTAN POTASSIUM 100 MG/1
TABLET ORAL
Qty: 90 TABLET | Refills: 1 | Status: SHIPPED | OUTPATIENT
Start: 2022-09-26

## 2023-01-26 ENCOUNTER — OFFICE VISIT (OUTPATIENT)
Dept: OBSTETRICS AND GYNECOLOGY | Facility: CLINIC | Age: 48
End: 2023-01-26
Payer: MEDICAID

## 2023-01-26 ENCOUNTER — PATIENT ROUNDING (BHMG ONLY) (OUTPATIENT)
Dept: OBSTETRICS AND GYNECOLOGY | Facility: CLINIC | Age: 48
End: 2023-01-26

## 2023-01-26 VITALS
SYSTOLIC BLOOD PRESSURE: 163 MMHG | HEIGHT: 67 IN | DIASTOLIC BLOOD PRESSURE: 86 MMHG | WEIGHT: 293 LBS | HEART RATE: 80 BPM | BODY MASS INDEX: 45.99 KG/M2

## 2023-01-26 DIAGNOSIS — Z01.419 ENCOUNTER FOR GYNECOLOGICAL EXAMINATION WITHOUT ABNORMAL FINDING: Primary | ICD-10-CM

## 2023-01-26 PROCEDURE — 87624 HPV HI-RISK TYP POOLED RSLT: CPT | Performed by: NURSE PRACTITIONER

## 2023-01-26 PROCEDURE — G0123 SCREEN CERV/VAG THIN LAYER: HCPCS | Performed by: NURSE PRACTITIONER

## 2023-01-26 PROCEDURE — 99386 PREV VISIT NEW AGE 40-64: CPT | Performed by: NURSE PRACTITIONER

## 2023-01-26 PROCEDURE — 3008F BODY MASS INDEX DOCD: CPT | Performed by: NURSE PRACTITIONER

## 2023-01-26 PROCEDURE — 2014F MENTAL STATUS ASSESS: CPT | Performed by: NURSE PRACTITIONER

## 2023-01-26 NOTE — PROGRESS NOTES
"Well Woman Visit    CC: Scheduled annual well gyn visit  Chief Complaint   Patient presents with   • Gynecologic Exam     wwe       Myriad intake in the past?: N/A and negative family history        Contraception:  None  Social History     Substance and Sexual Activity   Sexual Activity Defer       HPI:   47 y.o.     Menses:   q 28-30 days, lasts 6-7 days, changes products q 4hrs on heaviest days.     Pain:  Mild, OTC meds control discomfort    PCP: does manage PMHx and preventative labs  History: PMHx, Meds, Allergies, PSHx, Social Hx, and POBHx all reviewed and updated.    Pt has no complaints today.    PHYSICAL EXAM:  /86   Pulse 80   Ht 170.2 cm (67.01\")   Wt (!) 160 kg (352 lb)   BMI 55.12 kg/m²  Not found.  General- NAD, alert and oriented, appropriate  Psych- Normal mood, good memory  Neck- No masses, no thyroid enlargement  CV- Regular rhythm, no murnurs  Resp- CTA to bases, no wheezes  Abdomen- Soft, non distended, non tender, no masses    Breast left-  Bilaterally symmetrical, no masses, non tender, no nipple discharge  Breast right- Bilaterally symmetrical, no masses, non tender, no nipple discharge    External genitalia- Normal female, no lesions  Urethra/meatus- Normal, no masses, non tender  Bladder- Normal, no masses, non tender  Vagina- Normal, no atrophy, no lesions, no discharge.  Prolapse: none noted, not examined with split speculum to delineate  Cvx- Normal, no lesions, no discharge, No cervical motion tenderness  Uterus- Normal size, shape & consistency.  Non tender, mobile, & no prolapse  Adnexa- No mass, non tender  Anus/Rectum/Perineum- Not performed    Lymphatic- No palpable neck, axillary, or groin nodes  Ext- No edema, no cyanosis    Skin- No lesions, no rashes, no acanthosis nigricans      ASSESSMENT and PLAN:    Diagnoses and all orders for this visit:    1. Encounter for gynecological examination without abnormal finding (Primary)  -     IgP, Aptima HPV  -     Mammo " Screening Digital Tomosynthesis Bilateral With CAD; Future        Preventative:  • BREAST HEALTH- Monthly self breast exam importance and how to reviewed. MMG and/or MRI (prn) reviewed per society guidelines and her individual history. Screen: Updated today  • CERVICAL CANCER Screening- Reviewed current ASCCP guidelines for screening w and wo cotest HPV, age specific.  Screen: Updated today  • SEXUAL HEALTH: Declines STD screening  • VACCINATIONS Recommended: COVID and booster PRN, Flu annually, Gardisil/HPV vaccine (up to 46yo).  Importance discussed, risk being unvaccinated reviewed.  Questions answered  • Smoking status- NON SMOKER      She understands the importance of having any ordered tests to be performed in a timely fashion.  The risks of not performing them include, but are not limited to, advanced cancer stages, bone loss from osteoporosis and/or subsequent increase in morbidity and/or mortality.  She is encouraged to review her results online and/or contact or office if she has questions.     Follow Up:  Return in about 1 year (around 1/26/2024) for Annual physical.            Leroy Burns, APRN  01/26/2023    [facility]  [facility]  [address] [place] [address]  Dept: [phone]  Loc: [phone]

## 2023-01-31 ENCOUNTER — HOSPITAL ENCOUNTER (OUTPATIENT)
Dept: MAMMOGRAPHY | Facility: HOSPITAL | Age: 48
Discharge: HOME | End: 2023-01-31
Admitting: NURSE PRACTITIONER
Payer: MEDICAID

## 2023-01-31 DIAGNOSIS — Z01.419 ENCOUNTER FOR GYNECOLOGICAL EXAMINATION WITHOUT ABNORMAL FINDING: ICD-10-CM

## 2023-01-31 LAB
CYTOLOGIST CVX/VAG CYTO: NORMAL
CYTOLOGY CVX/VAG DOC CYTO: NORMAL
CYTOLOGY CVX/VAG DOC THIN PREP: NORMAL
DX ICD CODE: NORMAL
HIV 1 & 2 AB SER-IMP: NORMAL
HPV I/H RISK 4 DNA CVX QL PROBE+SIG AMP: NEGATIVE
OTHER STN SPEC: NORMAL
STAT OF ADQ CVX/VAG CYTO-IMP: NORMAL

## 2023-01-31 PROCEDURE — 77063 BREAST TOMOSYNTHESIS BI: CPT

## 2023-01-31 PROCEDURE — 77067 SCR MAMMO BI INCL CAD: CPT

## 2023-02-09 ENCOUNTER — APPOINTMENT (OUTPATIENT)
Dept: GENERAL RADIOLOGY | Facility: HOSPITAL | Age: 48
End: 2023-02-09
Payer: MEDICAID

## 2023-02-09 ENCOUNTER — APPOINTMENT (OUTPATIENT)
Dept: CT IMAGING | Facility: HOSPITAL | Age: 48
End: 2023-02-09
Payer: MEDICAID

## 2023-02-09 ENCOUNTER — HOSPITAL ENCOUNTER (EMERGENCY)
Facility: HOSPITAL | Age: 48
Discharge: HOME | End: 2023-02-09
Attending: EMERGENCY MEDICINE | Admitting: EMERGENCY MEDICINE
Payer: MEDICAID

## 2023-02-09 VITALS
TEMPERATURE: 97.6 F | OXYGEN SATURATION: 95 % | RESPIRATION RATE: 22 BRPM | HEART RATE: 82 BPM | SYSTOLIC BLOOD PRESSURE: 140 MMHG | DIASTOLIC BLOOD PRESSURE: 94 MMHG | HEIGHT: 68 IN | BODY MASS INDEX: 44.41 KG/M2 | WEIGHT: 293 LBS

## 2023-02-09 DIAGNOSIS — R59.0 MEDIASTINAL LYMPHADENOPATHY: ICD-10-CM

## 2023-02-09 DIAGNOSIS — J98.01 ACUTE BRONCHOSPASM: Primary | ICD-10-CM

## 2023-02-09 LAB
ALBUMIN SERPL-MCNC: 4.1 G/DL (ref 3.5–5.2)
ALBUMIN/GLOB SERPL: 1.1 G/DL
ALP SERPL-CCNC: 72 U/L (ref 39–117)
ALT SERPL W P-5'-P-CCNC: 25 U/L (ref 1–33)
ANION GAP SERPL CALCULATED.3IONS-SCNC: 12.5 MMOL/L (ref 5–15)
AST SERPL-CCNC: 21 U/L (ref 1–32)
BASOPHILS # BLD AUTO: 0.05 10*3/MM3 (ref 0–0.2)
BASOPHILS NFR BLD AUTO: 0.6 % (ref 0–1.5)
BILIRUB SERPL-MCNC: 0.8 MG/DL (ref 0–1.2)
BUN SERPL-MCNC: 8 MG/DL (ref 6–20)
BUN/CREAT SERPL: 12.3 (ref 7–25)
CALCIUM SPEC-SCNC: 9.3 MG/DL (ref 8.6–10.5)
CHLORIDE SERPL-SCNC: 103 MMOL/L (ref 98–107)
CO2 SERPL-SCNC: 23.5 MMOL/L (ref 22–29)
CREAT SERPL-MCNC: 0.65 MG/DL (ref 0.57–1)
D DIMER PPP FEU-MCNC: 0.66 MCGFEU/ML (ref 0–0.5)
DEPRECATED RDW RBC AUTO: 40.3 FL (ref 37–54)
EGFRCR SERPLBLD CKD-EPI 2021: 108.1 ML/MIN/1.73
EOSINOPHIL # BLD AUTO: 0.63 10*3/MM3 (ref 0–0.4)
EOSINOPHIL NFR BLD AUTO: 8 % (ref 0.3–6.2)
ERYTHROCYTE [DISTWIDTH] IN BLOOD BY AUTOMATED COUNT: 13.8 % (ref 12.3–15.4)
GEN 5 2HR TROPONIN T REFLEX: <6 NG/L
GLOBULIN UR ELPH-MCNC: 3.8 GM/DL
GLUCOSE SERPL-MCNC: 105 MG/DL (ref 65–99)
HCT VFR BLD AUTO: 38.9 % (ref 34–46.6)
HGB BLD-MCNC: 13.1 G/DL (ref 12–15.9)
HOLD SPECIMEN: NORMAL
HOLD SPECIMEN: NORMAL
IMM GRANULOCYTES # BLD AUTO: 0.04 10*3/MM3 (ref 0–0.05)
IMM GRANULOCYTES NFR BLD AUTO: 0.5 % (ref 0–0.5)
LYMPHOCYTES # BLD AUTO: 1.44 10*3/MM3 (ref 0.7–3.1)
LYMPHOCYTES NFR BLD AUTO: 18.4 % (ref 19.6–45.3)
MCH RBC QN AUTO: 27.8 PG (ref 26.6–33)
MCHC RBC AUTO-ENTMCNC: 33.7 G/DL (ref 31.5–35.7)
MCV RBC AUTO: 82.4 FL (ref 79–97)
MONOCYTES # BLD AUTO: 0.51 10*3/MM3 (ref 0.1–0.9)
MONOCYTES NFR BLD AUTO: 6.5 % (ref 5–12)
NEUTROPHILS NFR BLD AUTO: 5.16 10*3/MM3 (ref 1.7–7)
NEUTROPHILS NFR BLD AUTO: 66 % (ref 42.7–76)
NRBC BLD AUTO-RTO: 0 /100 WBC (ref 0–0.2)
NT-PROBNP SERPL-MCNC: 55.2 PG/ML (ref 0–450)
PLATELET # BLD AUTO: 224 10*3/MM3 (ref 140–450)
PMV BLD AUTO: 9.5 FL (ref 6–12)
POTASSIUM SERPL-SCNC: 4 MMOL/L (ref 3.5–5.2)
PROT SERPL-MCNC: 7.9 G/DL (ref 6–8.5)
RBC # BLD AUTO: 4.72 10*6/MM3 (ref 3.77–5.28)
SODIUM SERPL-SCNC: 139 MMOL/L (ref 136–145)
TROPONIN T DELTA: NORMAL
TROPONIN T SERPL HS-MCNC: <6 NG/L
WBC NRBC COR # BLD: 7.83 10*3/MM3 (ref 3.4–10.8)
WHOLE BLOOD HOLD COAG: NORMAL
WHOLE BLOOD HOLD SPECIMEN: NORMAL

## 2023-02-09 PROCEDURE — 36415 COLL VENOUS BLD VENIPUNCTURE: CPT

## 2023-02-09 PROCEDURE — 94799 UNLISTED PULMONARY SVC/PX: CPT

## 2023-02-09 PROCEDURE — 71045 X-RAY EXAM CHEST 1 VIEW: CPT

## 2023-02-09 PROCEDURE — 71260 CT THORAX DX C+: CPT

## 2023-02-09 PROCEDURE — 93005 ELECTROCARDIOGRAM TRACING: CPT

## 2023-02-09 PROCEDURE — 84484 ASSAY OF TROPONIN QUANT: CPT | Performed by: EMERGENCY MEDICINE

## 2023-02-09 PROCEDURE — 25010000002 METHYLPREDNISOLONE PER 125 MG: Performed by: EMERGENCY MEDICINE

## 2023-02-09 PROCEDURE — 99284 EMERGENCY DEPT VISIT MOD MDM: CPT

## 2023-02-09 PROCEDURE — 80053 COMPREHEN METABOLIC PANEL: CPT | Performed by: EMERGENCY MEDICINE

## 2023-02-09 PROCEDURE — 0 IOPAMIDOL PER 1 ML: Performed by: EMERGENCY MEDICINE

## 2023-02-09 PROCEDURE — 94640 AIRWAY INHALATION TREATMENT: CPT

## 2023-02-09 PROCEDURE — 83880 ASSAY OF NATRIURETIC PEPTIDE: CPT | Performed by: EMERGENCY MEDICINE

## 2023-02-09 PROCEDURE — 85025 COMPLETE CBC W/AUTO DIFF WBC: CPT | Performed by: EMERGENCY MEDICINE

## 2023-02-09 PROCEDURE — 85379 FIBRIN DEGRADATION QUANT: CPT | Performed by: EMERGENCY MEDICINE

## 2023-02-09 PROCEDURE — 93005 ELECTROCARDIOGRAM TRACING: CPT | Performed by: EMERGENCY MEDICINE

## 2023-02-09 PROCEDURE — 96374 THER/PROPH/DIAG INJ IV PUSH: CPT

## 2023-02-09 RX ORDER — PREDNISONE 50 MG/1
50 TABLET ORAL DAILY
Qty: 4 TABLET | Refills: 0 | Status: SHIPPED | OUTPATIENT
Start: 2023-02-09 | End: 2023-02-19

## 2023-02-09 RX ORDER — ALBUTEROL SULFATE 90 UG/1
2 AEROSOL, METERED RESPIRATORY (INHALATION) EVERY 4 HOURS PRN
Qty: 8 G | Refills: 0 | Status: SHIPPED | OUTPATIENT
Start: 2023-02-09 | End: 2023-02-28

## 2023-02-09 RX ORDER — IPRATROPIUM BROMIDE AND ALBUTEROL SULFATE 2.5; .5 MG/3ML; MG/3ML
3 SOLUTION RESPIRATORY (INHALATION) ONCE
Status: COMPLETED | OUTPATIENT
Start: 2023-02-09 | End: 2023-02-09

## 2023-02-09 RX ORDER — SODIUM CHLORIDE 0.9 % (FLUSH) 0.9 %
10 SYRINGE (ML) INJECTION AS NEEDED
Status: DISCONTINUED | OUTPATIENT
Start: 2023-02-09 | End: 2023-02-09 | Stop reason: HOSPADM

## 2023-02-09 RX ORDER — METHYLPREDNISOLONE SODIUM SUCCINATE 125 MG/2ML
125 INJECTION, POWDER, LYOPHILIZED, FOR SOLUTION INTRAMUSCULAR; INTRAVENOUS ONCE
Status: COMPLETED | OUTPATIENT
Start: 2023-02-09 | End: 2023-02-09

## 2023-02-09 RX ADMIN — IPRATROPIUM BROMIDE AND ALBUTEROL SULFATE 3 ML: .5; 2.5 SOLUTION RESPIRATORY (INHALATION) at 05:15

## 2023-02-09 RX ADMIN — IOPAMIDOL 100 ML: 755 INJECTION, SOLUTION INTRAVENOUS at 06:37

## 2023-02-09 RX ADMIN — METHYLPREDNISOLONE SODIUM SUCCINATE 125 MG: 125 INJECTION, POWDER, FOR SOLUTION INTRAMUSCULAR; INTRAVENOUS at 07:24

## 2023-02-09 NOTE — DISCHARGE INSTRUCTIONS
Please follow-up with the pulmonologist and/or your primary care provider to see if further evaluation is required of the lymph nodes resolved within the mediastinum.

## 2023-02-09 NOTE — ED PROVIDER NOTES
Time: 4:30 AM EST  Date of encounter:  2/9/2023  Independent Historian/Clinical History and Information was obtained by:   Patient  Chief Complaint: SOB    History is limited by: N/A    History of Present Illness:  Patient is a 47 y.o. year old female who presents to the emergency department for evaluation of SOB. Pt notes SOB over the last 2 days. Pt notes runny nose and cough. Pt notes trouble catch her breath and feels like she can't get a good breath. Pt notes laying flat and exertion makes the SOB worse. Pt notes she wakes up gasping for air at night. Pt denies hx of breathing issues. Pt notes she stopped smoking in 2017. Pt denies chest pain and leg swelling. Pt notes hx of HTN.         History provided by:  Patient   used: No    Shortness of Breath  Onset quality:  Sudden  Duration:  2 days  Timing:  Intermittent  Progression:  Unchanged  Chronicity:  New  Worsened by:  Exertion, activity and deep breathing  Associated symptoms: no abdominal pain, no chest pain, no cough, no fever, no headaches, no sore throat and no vomiting        Patient Care Team  Primary Care Provider: Bala Thompson APRN    Past Medical History:     No Known Allergies  Past Medical History:   Diagnosis Date   • Achilles tendon rupture, right, subsequent encounter    • Ankle pain, right    • Arthritis    • HTN (hypertension)      Past Surgical History:   Procedure Laterality Date   • ACHILLES TENDON SURGERY Right 3/18/2022    Procedure: ACHILLES TENDON REPAIR, BONE SPUR EXCISION;  Surgeon: Sergei Russ DPM;  Location: [facility];  Service: Podiatry;  Laterality: Right;   • LASIK  2011     Family History   Problem Relation Age of Onset   • Diabetes Mother    • Hypertension Mother    • Stroke Neg Hx        Home Medications:  Prior to Admission medications    Medication Sig Start Date End Date Taking? Authorizing Provider   diclofenac (VOLTAREN) 75 MG EC tablet TAKE 1 TABLET BY MOUTH TWICE DAILY 6/27/22    "Sergei Russ DPM   losartan (COZAAR) 100 MG tablet TAKE 1 TABLET BY MOUTH EVERY DAY 22   Bala Thompson APRN        Social History:   Social History     Tobacco Use   • Smoking status: Former     Packs/day: 0.00     Years: 0.00     Pack years: 0.00     Types: Cigarettes     Quit date: 2012     Years since quittin.1   • Smokeless tobacco: Never   • Tobacco comments:     QUIT 2012 WAS A SOCIAL SMOKER FOR 18 YEARS   Vaping Use   • Vaping Use: Never used   Substance Use Topics   • Alcohol use: Yes     Comment: RARELY   • Drug use: Never         Review of Systems:  Review of Systems   Constitutional: Negative for chills and fever.   HENT: Negative for congestion, rhinorrhea and sore throat.    Eyes: Negative for pain and visual disturbance.   Respiratory: Positive for shortness of breath. Negative for apnea, cough and chest tightness.    Cardiovascular: Negative for chest pain and palpitations.   Gastrointestinal: Negative for abdominal pain, diarrhea, nausea and vomiting.   Genitourinary: Negative for difficulty urinating and dysuria.   Musculoskeletal: Negative for joint swelling and myalgias.   Skin: Negative for color change.   Neurological: Negative for seizures and headaches.   Psychiatric/Behavioral: Negative.    All other systems reviewed and are negative.       Physical Exam:  /94   Pulse 82   Temp 97.6 °F (36.4 °C) (Oral)   Resp 22   Ht 172.7 cm (68\")   Wt (!) 157 kg (347 lb 3.6 oz)   LMP 2023   SpO2 95%   BMI 52.80 kg/m²     Physical Exam  Vitals and nursing note reviewed.   Constitutional:       General: She is not in acute distress.     Appearance: Normal appearance. She is not toxic-appearing.   HENT:      Head: Normocephalic and atraumatic.      Jaw: There is normal jaw occlusion.   Eyes:      General: Lids are normal.      Extraocular Movements: Extraocular movements intact.      Conjunctiva/sclera: Conjunctivae normal.      Pupils: Pupils are equal, round, " and reactive to light.   Cardiovascular:      Rate and Rhythm: Normal rate and regular rhythm.      Pulses: Normal pulses.      Heart sounds: Normal heart sounds.   Pulmonary:      Effort: Tachypnea and respiratory distress present.      Breath sounds: Decreased breath sounds (Bilaterally) present. No wheezing or rhonchi.   Abdominal:      General: Abdomen is flat.      Palpations: Abdomen is soft.      Tenderness: There is no abdominal tenderness. There is no guarding or rebound.   Musculoskeletal:         General: Normal range of motion.      Cervical back: Normal range of motion and neck supple.      Right lower leg: Edema present.      Left lower leg: Edema present.   Skin:     General: Skin is warm and dry.   Neurological:      Mental Status: She is alert and oriented to person, place, and time. Mental status is at baseline.   Psychiatric:         Mood and Affect: Mood normal.                  Procedures:  Procedures      Medical Decision Making:      Comorbidities that affect care:    Hypertension    External Notes reviewed:    None      The following orders were placed and all results were independently analyzed by me:  Orders Placed This Encounter   Procedures   • XR Chest 1 View   • CT Chest With Contrast Diagnostic   • [place] Draw   • Comprehensive Metabolic Panel   • BNP   • Troponin   • CBC Auto Differential   • D-dimer, Quantitative   • High Sensitivity Troponin T 2Hr   • Undress & Gown   • Continuous Pulse Oximetry   • Vital Signs   • ECG 12 Lead ED Triage Standing Order; SOA   • CBC & Differential   • Green Top (Gel)   • Lavender Top   • Gold Top - SST   • Light Blue Top       Medications Given in the Emergency Department:  Medications   ipratropium-albuterol (DUO-NEB) nebulizer solution 3 mL (3 mL Nebulization Given 2/9/23 6950)   iopamidol (ISOVUE-370) 76 % injection 100 mL (100 mL Intravenous Given 2/9/23 9966)   methylPREDNISolone sodium succinate (SOLU-Medrol) injection 125 mg (125 mg  Intravenous Given 2/9/23 0724)        ED Course:         Labs:    Lab Results (last 24 hours)     ** No results found for the last 24 hours. **           Imaging:    No Radiology Exams Resulted Within Past 24 Hours      Differential Diagnosis and Discussion:    Dyspnea: Differential diagnosis includes but is not limited to metabolic acidosis, neurological disorders, psychogenic, asthma, pneumothorax, upper airway obstruction, COPD, pneumonia, noncardiogenic pulmonary edema, interstitial lung disease, anemia, congestive heart failure, and pulmonary embolism    All labs were reviewed and interpreted by me.  All X-rays were independently reviewed by me.  EKG was interpreted by me.  CT scan radiology interpretation was reviewed by me.    MDM         Patient Care Considerations:    ANTIBIOTICS: I considered prescribing antibiotics as an outpatient however No bacterial etiology discovered on work-up      Consultants/Shared Management Plan:    None    Social Determinants of Health:    Patient is independent, reliable, and has access to care.       Disposition and Care Coordination:    Discharged: I considered escalation of care by admitting this patient for observation, however the patient has improved and is suitable and  stable for discharge.    I have explained the patient´s condition, diagnoses and treatment plan based on the information available to me at this time. I have answered questions and addressed any concerns. The patient has a good  understanding of the patient´s diagnosis, condition, and treatment plan as can be expected at this point. The vital signs have been stable. The patient´s condition is stable and appropriate for discharge from the emergency department.      The patient will pursue further outpatient evaluation with the primary care physician or other designated or consulting physician as outlined in the discharge instructions. They are agreeable to this plan of care and follow-up instructions have  been explained in detail. The patient has received these instructions in written format and have expressed an understanding of the discharge instructions. The patient is aware that any significant change in condition or worsening of symptoms should prompt an immediate return to this or the closest emergency department or call to 911.    Final diagnoses:   Acute bronchospasm   Mediastinal lymphadenopathy        ED Disposition     ED Disposition   Discharge    Condition   Stable    Comment   Pt medically stable for discharge.  IV removed and discharge instructions reviewed with pt.               This medical record created using voice recognition software.      Documentation assistance provided by Rodrigue mirza, acting as scribe for Sandeep Rodriguez MD. Information recorded by the scribe was done at my direction and has been verified and validated by me.         Rodrigue Mirza  02/09/23 0445       Sandeep Rodriguez MD  02/10/23 0800

## 2023-02-15 LAB — QT INTERVAL: 403 MS

## 2023-02-19 ENCOUNTER — APPOINTMENT (OUTPATIENT)
Dept: GENERAL RADIOLOGY | Facility: HOSPITAL | Age: 48
End: 2023-02-19
Payer: MEDICAID

## 2023-02-19 ENCOUNTER — HOSPITAL ENCOUNTER (EMERGENCY)
Facility: HOSPITAL | Age: 48
Discharge: HOME | End: 2023-02-19
Attending: EMERGENCY MEDICINE | Admitting: EMERGENCY MEDICINE
Payer: MEDICAID

## 2023-02-19 VITALS
TEMPERATURE: 97.9 F | RESPIRATION RATE: 28 BRPM | SYSTOLIC BLOOD PRESSURE: 111 MMHG | HEART RATE: 97 BPM | HEIGHT: 68 IN | WEIGHT: 293 LBS | OXYGEN SATURATION: 97 % | DIASTOLIC BLOOD PRESSURE: 71 MMHG | BODY MASS INDEX: 44.41 KG/M2

## 2023-02-19 DIAGNOSIS — R06.02 SHORTNESS OF BREATH: ICD-10-CM

## 2023-02-19 DIAGNOSIS — J20.9 ACUTE BRONCHITIS WITH BRONCHOSPASM: Primary | ICD-10-CM

## 2023-02-19 LAB
ALBUMIN SERPL-MCNC: 4.5 G/DL (ref 3.5–5.2)
ALBUMIN/GLOB SERPL: 1.2 G/DL
ALP SERPL-CCNC: 77 U/L (ref 39–117)
ALT SERPL W P-5'-P-CCNC: 31 U/L (ref 1–33)
ANION GAP SERPL CALCULATED.3IONS-SCNC: 10.9 MMOL/L (ref 5–15)
ARTERIAL PATENCY WRIST A: POSITIVE
AST SERPL-CCNC: 23 U/L (ref 1–32)
BASE EXCESS BLDA CALC-SCNC: -1.5 MMOL/L (ref -2–2)
BASOPHILS # BLD AUTO: 0.07 10*3/MM3 (ref 0–0.2)
BASOPHILS NFR BLD AUTO: 0.7 % (ref 0–1.5)
BDY SITE: ABNORMAL
BILIRUB SERPL-MCNC: 0.9 MG/DL (ref 0–1.2)
BUN SERPL-MCNC: 7 MG/DL (ref 6–20)
BUN/CREAT SERPL: 9.6 (ref 7–25)
CA-I BLDA-SCNC: 1.14 MMOL/L (ref 1.13–1.32)
CALCIUM SPEC-SCNC: 9.6 MG/DL (ref 8.6–10.5)
CHLORIDE BLDA-SCNC: 101 MMOL/L (ref 98–106)
CHLORIDE SERPL-SCNC: 101 MMOL/L (ref 98–107)
CO2 SERPL-SCNC: 27.1 MMOL/L (ref 22–29)
COHGB MFR BLD: 1.2 % (ref 0–1.5)
CREAT SERPL-MCNC: 0.73 MG/DL (ref 0.57–1)
D-LACTATE SERPL-SCNC: 2.1 MMOL/L (ref 0.5–2)
DEPRECATED RDW RBC AUTO: 42.2 FL (ref 37–54)
EGFRCR SERPLBLD CKD-EPI 2021: 101 ML/MIN/1.73
EOSINOPHIL # BLD AUTO: 0.71 10*3/MM3 (ref 0–0.4)
EOSINOPHIL NFR BLD AUTO: 6.8 % (ref 0.3–6.2)
ERYTHROCYTE [DISTWIDTH] IN BLOOD BY AUTOMATED COUNT: 13.8 % (ref 12.3–15.4)
FHHB: 6.5 % (ref 0–5)
GIANT PLATELETS: NORMAL
GLOBULIN UR ELPH-MCNC: 3.7 GM/DL
GLUCOSE BLDA-MCNC: 103 MG/DL (ref 65–99)
GLUCOSE SERPL-MCNC: 100 MG/DL (ref 65–99)
HCO3 BLDA-SCNC: 22.7 MMOL/L (ref 22–26)
HCT VFR BLD AUTO: 42.5 % (ref 34–46.6)
HGB BLD-MCNC: 14.2 G/DL (ref 12–15.9)
HGB BLDA-MCNC: 14.8 G/DL (ref 11.7–14.6)
HOLD SPECIMEN: NORMAL
HOLD SPECIMEN: NORMAL
IMM GRANULOCYTES # BLD AUTO: 0.08 10*3/MM3 (ref 0–0.05)
IMM GRANULOCYTES NFR BLD AUTO: 0.8 % (ref 0–0.5)
INHALED O2 CONCENTRATION: 21 %
LACTATE BLDA-SCNC: 1.21 MMOL/L (ref 0.5–2)
LARGE PLATELETS: NORMAL
LYMPHOCYTES # BLD AUTO: 2.04 10*3/MM3 (ref 0.7–3.1)
LYMPHOCYTES NFR BLD AUTO: 19.6 % (ref 19.6–45.3)
MCH RBC QN AUTO: 28 PG (ref 26.6–33)
MCHC RBC AUTO-ENTMCNC: 33.4 G/DL (ref 31.5–35.7)
MCV RBC AUTO: 83.7 FL (ref 79–97)
METHGB BLD QL: 0.2 % (ref 0–1.5)
MODALITY: ABNORMAL
MONOCYTES # BLD AUTO: 0.71 10*3/MM3 (ref 0.1–0.9)
MONOCYTES NFR BLD AUTO: 6.8 % (ref 5–12)
NEUTROPHILS NFR BLD AUTO: 6.8 10*3/MM3 (ref 1.7–7)
NEUTROPHILS NFR BLD AUTO: 65.3 % (ref 42.7–76)
NRBC BLD AUTO-RTO: 0 /100 WBC (ref 0–0.2)
NT-PROBNP SERPL-MCNC: <36 PG/ML (ref 0–450)
OXYHGB MFR BLDV: 92.1 % (ref 94–99)
PCO2 BLDA: 36.9 MM HG (ref 35–45)
PH BLDA: 7.41 PH UNITS (ref 7.35–7.45)
PLATELET # BLD AUTO: 261 10*3/MM3 (ref 140–450)
PMV BLD AUTO: 10 FL (ref 6–12)
PO2 BLD: 309 MM[HG] (ref 0–500)
PO2 BLDA: 64.8 MM HG (ref 80–100)
POTASSIUM BLDA-SCNC: 3.92 MMOL/L (ref 3.5–5)
POTASSIUM SERPL-SCNC: 4.2 MMOL/L (ref 3.5–5.2)
PROT SERPL-MCNC: 8.2 G/DL (ref 6–8.5)
RBC # BLD AUTO: 5.08 10*6/MM3 (ref 3.77–5.28)
RBC MORPH BLD: NORMAL
SAO2 % BLDCOA: 93.4 % (ref 95–99)
SMALL PLATELETS BLD QL SMEAR: ADEQUATE
SODIUM BLDA-SCNC: 137.3 MMOL/L (ref 136–146)
SODIUM SERPL-SCNC: 139 MMOL/L (ref 136–145)
TROPONIN T SERPL HS-MCNC: 7 NG/L
WBC MORPH BLD: NORMAL
WBC NRBC COR # BLD: 10.41 10*3/MM3 (ref 3.4–10.8)
WHOLE BLOOD HOLD COAG: NORMAL
WHOLE BLOOD HOLD SPECIMEN: NORMAL

## 2023-02-19 PROCEDURE — 96374 THER/PROPH/DIAG INJ IV PUSH: CPT

## 2023-02-19 PROCEDURE — 83880 ASSAY OF NATRIURETIC PEPTIDE: CPT | Performed by: EMERGENCY MEDICINE

## 2023-02-19 PROCEDURE — 71045 X-RAY EXAM CHEST 1 VIEW: CPT

## 2023-02-19 PROCEDURE — 93005 ELECTROCARDIOGRAM TRACING: CPT | Performed by: EMERGENCY MEDICINE

## 2023-02-19 PROCEDURE — 82375 ASSAY CARBOXYHB QUANT: CPT | Performed by: EMERGENCY MEDICINE

## 2023-02-19 PROCEDURE — 94640 AIRWAY INHALATION TREATMENT: CPT

## 2023-02-19 PROCEDURE — 99284 EMERGENCY DEPT VISIT MOD MDM: CPT

## 2023-02-19 PROCEDURE — 84484 ASSAY OF TROPONIN QUANT: CPT | Performed by: EMERGENCY MEDICINE

## 2023-02-19 PROCEDURE — 85025 COMPLETE CBC W/AUTO DIFF WBC: CPT | Performed by: EMERGENCY MEDICINE

## 2023-02-19 PROCEDURE — 25010000002 METHYLPREDNISOLONE PER 125 MG: Performed by: EMERGENCY MEDICINE

## 2023-02-19 PROCEDURE — 82805 BLOOD GASES W/O2 SATURATION: CPT | Performed by: EMERGENCY MEDICINE

## 2023-02-19 PROCEDURE — 87040 BLOOD CULTURE FOR BACTERIA: CPT | Performed by: EMERGENCY MEDICINE

## 2023-02-19 PROCEDURE — 80053 COMPREHEN METABOLIC PANEL: CPT | Performed by: EMERGENCY MEDICINE

## 2023-02-19 PROCEDURE — 83605 ASSAY OF LACTIC ACID: CPT | Performed by: EMERGENCY MEDICINE

## 2023-02-19 PROCEDURE — 93005 ELECTROCARDIOGRAM TRACING: CPT

## 2023-02-19 PROCEDURE — 85007 BL SMEAR W/DIFF WBC COUNT: CPT | Performed by: EMERGENCY MEDICINE

## 2023-02-19 PROCEDURE — 94799 UNLISTED PULMONARY SVC/PX: CPT

## 2023-02-19 PROCEDURE — 93010 ELECTROCARDIOGRAM REPORT: CPT | Performed by: SPECIALIST

## 2023-02-19 PROCEDURE — 83050 HGB METHEMOGLOBIN QUAN: CPT | Performed by: EMERGENCY MEDICINE

## 2023-02-19 PROCEDURE — 36600 WITHDRAWAL OF ARTERIAL BLOOD: CPT | Performed by: EMERGENCY MEDICINE

## 2023-02-19 RX ORDER — PREDNISONE 20 MG/1
40 TABLET ORAL DAILY
Qty: 14 TABLET | Refills: 0 | Status: SHIPPED | OUTPATIENT
Start: 2023-02-19 | End: 2023-02-26

## 2023-02-19 RX ORDER — AZITHROMYCIN 250 MG/1
TABLET, FILM COATED ORAL
Qty: 6 TABLET | Refills: 0 | Status: SHIPPED | OUTPATIENT
Start: 2023-02-19 | End: 2023-02-28

## 2023-02-19 RX ORDER — DEXAMETHASONE SODIUM PHOSPHATE 10 MG/ML
10 INJECTION, SOLUTION INTRAMUSCULAR; INTRAVENOUS ONCE
Status: DISCONTINUED | OUTPATIENT
Start: 2023-02-19 | End: 2023-02-19

## 2023-02-19 RX ORDER — SODIUM CHLORIDE 0.9 % (FLUSH) 0.9 %
10 SYRINGE (ML) INJECTION AS NEEDED
Status: DISCONTINUED | OUTPATIENT
Start: 2023-02-19 | End: 2023-02-19 | Stop reason: HOSPADM

## 2023-02-19 RX ORDER — IPRATROPIUM BROMIDE AND ALBUTEROL SULFATE 2.5; .5 MG/3ML; MG/3ML
3 SOLUTION RESPIRATORY (INHALATION) ONCE
Status: COMPLETED | OUTPATIENT
Start: 2023-02-19 | End: 2023-02-19

## 2023-02-19 RX ORDER — ALBUTEROL SULFATE 2.5 MG/3ML
2.5 SOLUTION RESPIRATORY (INHALATION) ONCE
Status: COMPLETED | OUTPATIENT
Start: 2023-02-19 | End: 2023-02-19

## 2023-02-19 RX ORDER — IPRATROPIUM BROMIDE AND ALBUTEROL SULFATE 2.5; .5 MG/3ML; MG/3ML
SOLUTION RESPIRATORY (INHALATION)
Status: COMPLETED
Start: 2023-02-19 | End: 2023-02-19

## 2023-02-19 RX ORDER — IPRATROPIUM BROMIDE AND ALBUTEROL SULFATE 2.5; .5 MG/3ML; MG/3ML
3 SOLUTION RESPIRATORY (INHALATION) EVERY 4 HOURS PRN
Qty: 180 ML | Refills: 1 | Status: SHIPPED | OUTPATIENT
Start: 2023-02-19 | End: 2023-02-28

## 2023-02-19 RX ORDER — METHYLPREDNISOLONE SODIUM SUCCINATE 125 MG/2ML
125 INJECTION, POWDER, LYOPHILIZED, FOR SOLUTION INTRAMUSCULAR; INTRAVENOUS ONCE
Status: COMPLETED | OUTPATIENT
Start: 2023-02-19 | End: 2023-02-19

## 2023-02-19 RX ORDER — NITROGLYCERIN 0.4 MG/1
0.4 TABLET SUBLINGUAL ONCE
Status: COMPLETED | OUTPATIENT
Start: 2023-02-19 | End: 2023-02-19

## 2023-02-19 RX ADMIN — METHYLPREDNISOLONE SODIUM SUCCINATE 125 MG: 125 INJECTION, POWDER, FOR SOLUTION INTRAMUSCULAR; INTRAVENOUS at 15:42

## 2023-02-19 RX ADMIN — NITROGLYCERIN 0.4 MG: 0.4 TABLET, ORALLY DISINTEGRATING SUBLINGUAL at 15:42

## 2023-02-19 RX ADMIN — IPRATROPIUM BROMIDE AND ALBUTEROL SULFATE 3 ML: 2.5; .5 SOLUTION RESPIRATORY (INHALATION) at 15:30

## 2023-02-19 RX ADMIN — IPRATROPIUM BROMIDE AND ALBUTEROL SULFATE 3 ML: .5; 2.5 SOLUTION RESPIRATORY (INHALATION) at 15:30

## 2023-02-19 RX ADMIN — IPRATROPIUM BROMIDE AND ALBUTEROL SULFATE 3 ML: .5; 2.5 SOLUTION RESPIRATORY (INHALATION) at 15:36

## 2023-02-19 RX ADMIN — ALBUTEROL SULFATE 2.5 MG: 2.5 SOLUTION RESPIRATORY (INHALATION) at 15:47

## 2023-02-19 NOTE — DISCHARGE INSTRUCTIONS
All of your blood work and EKG and heart enzymes and x-ray looked okay today.    It sounds like you have a bad case of bronchospasm secondary to some acute bronchitis.    Most of the treatment will be doing breathing treatments as needed for wheezing and shortness of breath as well as using this prednisone to help with your breathing (you can start the prednisone tomorrow morning as we gave you Solu-Medrol here today).    Please follow-up with your doctor for any further issues or return to the ER if your breathing gets worse again.

## 2023-02-19 NOTE — ED PROVIDER NOTES
Time: 3:23 PM EST  Date of encounter:  2/19/2023  Independent Historian/Clinical History and Information was obtained by:   Patient  Chief Complaint: SOB    History is limited by: N/A    History of Present Illness:  Patient is a 47 y.o. year old female who presents to the emergency department for evaluation of SOB with onset today. Pt was seen on 2/9 for same symptoms and had a CT scan, which was negative. Pt was dx with bronchospasms. Pt was also coughing. She denies any fever or chills. Pt denies any chest pain.     HPI    Patient Care Team  Primary Care Provider: Bala Thompson APRN    Past Medical History:     No Known Allergies  Past Medical History:   Diagnosis Date   • Achilles tendon rupture, right, subsequent encounter    • Ankle pain, right    • Arthritis    • HTN (hypertension)      Past Surgical History:   Procedure Laterality Date   • ACHILLES TENDON SURGERY Right 3/18/2022    Procedure: ACHILLES TENDON REPAIR, BONE SPUR EXCISION;  Surgeon: Sergei Russ DPM;  Location: [facility];  Service: Podiatry;  Laterality: Right;   • LASIK  2011     Family History   Problem Relation Age of Onset   • Diabetes Mother    • Hypertension Mother    • Stroke Neg Hx        Home Medications:  Prior to Admission medications    Medication Sig Start Date End Date Taking? Authorizing Provider   albuterol sulfate  (90 Base) MCG/ACT inhaler Inhale 2 puffs Every 4 (Four) Hours As Needed for Wheezing. 2/9/23   Sandeep Rodriguez MD   diclofenac (VOLTAREN) 75 MG EC tablet TAKE 1 TABLET BY MOUTH TWICE DAILY 6/27/22   Sergei Russ DPM   losartan (COZAAR) 100 MG tablet TAKE 1 TABLET BY MOUTH EVERY DAY 9/26/22   Bala Thompson APRN   predniSONE (DELTASONE) 50 MG tablet Take 1 tablet by mouth Daily. 2/9/23   Sandeep Rodriguez MD        Social History:   Social History     Tobacco Use   • Smoking status: Former     Packs/day: 0.00     Years: 0.00     Pack years: 0.00     Types: Cigarettes     Quit date:  "2012     Years since quittin.1   • Smokeless tobacco: Never   • Tobacco comments:     QUIT 2012 WAS A SOCIAL SMOKER FOR 18 YEARS   Vaping Use   • Vaping Use: Never used   Substance Use Topics   • Alcohol use: Yes     Comment: RARELY   • Drug use: Never         Review of Systems:  Review of Systems   Constitutional: Negative for chills and fever.   HENT: Negative for congestion, ear pain and sore throat.    Eyes: Negative for pain.   Respiratory: Positive for cough and shortness of breath. Negative for chest tightness.    Cardiovascular: Negative for chest pain.   Gastrointestinal: Negative for abdominal pain, diarrhea, nausea and vomiting.   Genitourinary: Negative for flank pain and hematuria.   Musculoskeletal: Negative for joint swelling.   Skin: Negative for pallor.   Neurological: Negative for seizures and headaches.   All other systems reviewed and are negative.       Physical Exam:  /71   Pulse 97   Temp 97.9 °F (36.6 °C) (Oral)   Resp 28   Ht 172.7 cm (68\")   Wt (!) 154 kg (340 lb 9.8 oz)   LMP 2023 (Exact Date)   SpO2 97%   Breastfeeding No   BMI 51.79 kg/m²     Physical Exam  Vitals and nursing note reviewed.   Constitutional:       General: She is in acute distress.      Appearance: Normal appearance. She is not toxic-appearing.   HENT:      Head: Normocephalic and atraumatic.      Mouth/Throat:      Mouth: Mucous membranes are moist.   Eyes:      General: No scleral icterus.  Cardiovascular:      Rate and Rhythm: Normal rate and regular rhythm.      Pulses: Normal pulses.      Heart sounds: Normal heart sounds.   Pulmonary:      Effort: Tachypnea and respiratory distress present.      Breath sounds: Wheezing present.      Comments: Unable to speak full sentences due to dyspnea.   Abdominal:      General: Abdomen is flat.      Palpations: Abdomen is soft.      Tenderness: There is no abdominal tenderness.   Musculoskeletal:         General: Normal range of motion.      " Cervical back: Normal range of motion and neck supple.      Right lower leg: No edema.      Left lower leg: No edema.   Skin:     General: Skin is warm and dry.   Neurological:      Mental Status: She is alert and oriented to person, place, and time. Mental status is at baseline.                  Procedures:  Procedures      Medical Decision Making:      Comorbidities that affect care:    Hypertension    External Notes reviewed:    Past CT scans, past labs, past ED notes      The following orders were placed and all results were independently analyzed by me:  Orders Placed This Encounter   Procedures   • Home Nebulizer   • Blood Culture - Blood,   • Blood Culture - Blood,   • XR Chest 1 View   • [place] Draw   • Comprehensive Metabolic Panel   • BNP   • High Sensitivity Troponin T   • Lactic Acid, Plasma   • CBC Auto Differential   • ABG with Co-Ox and Electrolytes   • Scan Slide   • Undress & Gown   • Vital Signs   • Undress & Gown   • Continuous Pulse Oximetry   • Vital Signs   • ECG 12 Lead ED Triage Standing Order; SOA   • CBC & Differential   • Green Top (Gel)   • Lavender Top   • Gold Top - SST   • Light Blue Top       Medications Given in the Emergency Department:  Medications   ipratropium-albuterol (DUO-NEB) nebulizer solution 3 mL (3 mL Nebulization Given 2/19/23 1530)   methylPREDNISolone sodium succinate (SOLU-Medrol) injection 125 mg (125 mg Intravenous Given 2/19/23 1542)   ipratropium-albuterol (DUO-NEB) nebulizer solution 3 mL (3 mL Nebulization Given 2/19/23 1536)   albuterol (PROVENTIL) nebulizer solution 0.083% 2.5 mg/3mL (2.5 mg Nebulization Given 2/19/23 1547)   nitroglycerin (NITROSTAT) SL tablet 0.4 mg (0.4 mg Sublingual Given 2/19/23 1542)        ED Course:    ED Course as of 02/19/23 2325   Sun Feb 19, 2023   1523 EKG: I reviewed and interpreted her twelve-lead EKG as normal sinus rhythm at 94 bpm, normal P waves, QRS showing variation in amplitude, normal ST segments and T waves; no acute  ischemia. [VS]      ED Course User Index  [VS] Dequan Vanessa MD       Labs:    Lab Results (last 24 hours)     Procedure Component Value Units Date/Time    CBC & Differential [[id number]]  (Abnormal) Collected: 02/19/23 1526    Specimen: Blood Updated: 02/19/23 1615    Narrative:      The following orders were created for panel order CBC & Differential.  Procedure                               Abnormality         Status                     ---------                               -----------         ------                     CBC Auto Differential[[id number]]        Abnormal            Final result               Scan Slide[[id number]]                                       Final result                 Please view results for these tests on the individual orders.    Comprehensive Metabolic Panel [[id number]]  (Abnormal) Collected: 02/19/23 1526    Specimen: Blood Updated: 02/19/23 1617     Glucose 100 mg/dL      BUN 7 mg/dL      Creatinine 0.73 mg/dL      Sodium 139 mmol/L      Potassium 4.2 mmol/L      Chloride 101 mmol/L      CO2 27.1 mmol/L      Calcium 9.6 mg/dL      Total Protein 8.2 g/dL      Albumin 4.5 g/dL      ALT (SGPT) 31 U/L      AST (SGOT) 23 U/L      Alkaline Phosphatase 77 U/L      Total Bilirubin 0.9 mg/dL      Globulin 3.7 gm/dL      A/G Ratio 1.2 g/dL      BUN/Creatinine Ratio 9.6     Anion Gap 10.9 mmol/L      eGFR 101.0 mL/min/1.73     Narrative:      GFR Normal >60  Chronic Kidney Disease <60  Kidney Failure <15      BNP [[id number]]  (Normal) Collected: 02/19/23 1526    Specimen: Blood Updated: 02/19/23 1615     proBNP <36.0 pg/mL     Narrative:      Among patients with dyspnea, NT-proBNP is highly sensitive for the detection of acute congestive heart failure. In addition NT-proBNP of <300 pg/ml effectively rules out acute congestive heart failure with 99% negative predictive value.      High Sensitivity Troponin T [[id number]]  (Normal) Collected: 02/19/23 1526    Specimen: Blood Updated: 02/19/23  1617     HS Troponin T 7 ng/L     Narrative:      High Sensitive Troponin T Reference Range:  <10.0 ng/L- Negative Female for AMI  <15.0 ng/L- Negative Male for AMI  >=10 - Abnormal Female indicating possible myocardial injury.  >=15 - Abnormal Male indicating possible myocardial injury.   Clinicians would have to utilize clinical acumen, EKG, Troponin, and serial changes to determine if it is an Acute Myocardial Infarction or myocardial injury due to an underlying chronic condition.         Lactic Acid, Plasma [[id number]]  (Abnormal) Collected: 02/19/23 1526    Specimen: Blood Updated: 02/19/23 1630     Lactate 2.1 mmol/L     Blood Culture - Blood, Arm, Left [[id number]] Collected: 02/19/23 1526    Specimen: Blood from Arm, Left Updated: 02/19/23 1541    CBC Auto Differential [[id number]]  (Abnormal) Collected: 02/19/23 1526    Specimen: Blood Updated: 02/19/23 1615     WBC 10.41 10*3/mm3      RBC 5.08 10*6/mm3      Hemoglobin 14.2 g/dL      Hematocrit 42.5 %      MCV 83.7 fL      MCH 28.0 pg      MCHC 33.4 g/dL      RDW 13.8 %      RDW-SD 42.2 fl      MPV 10.0 fL      Platelets 261 10*3/mm3      Neutrophil % 65.3 %      Lymphocyte % 19.6 %      Monocyte % 6.8 %      Eosinophil % 6.8 %      Basophil % 0.7 %      Immature Grans % 0.8 %      Neutrophils, Absolute 6.80 10*3/mm3      Lymphocytes, Absolute 2.04 10*3/mm3      Monocytes, Absolute 0.71 10*3/mm3      Eosinophils, Absolute 0.71 10*3/mm3      Basophils, Absolute 0.07 10*3/mm3      Immature Grans, Absolute 0.08 10*3/mm3      nRBC 0.0 /100 WBC     Scan Slide [[id number]] Collected: 02/19/23 1526    Specimen: Blood Updated: 02/19/23 1615     RBC Morphology Normal     WBC Morphology Normal     Platelet Estimate Adequate     Large Platelets Slight/1+     Giant Platelets Slight/1+    ABG with Co-Ox and Electrolytes [[id number]]  (Abnormal) Collected: 02/19/23 1539    Specimen: Arterial Blood from Arm, Right Updated: 02/19/23 1544     pH, Arterial 7.407 pH units       pCO2, Arterial 36.9 mm Hg      pO2, Arterial 64.8 mm Hg      HCO3, Arterial 22.7 mmol/L      Base Excess, Arterial -1.5 mmol/L      O2 Saturation, Arterial 93.4 %      Hemoglobin, Blood Gas 14.8 g/dL      Carboxyhemoglobin 1.2 %      Methemoglobin 0.20 %      Oxyhemoglobin 92.1 %      FHHB 6.5 %      Travis's Test Positive     Site Arterial: right radial     Modality Room Air     FIO2 21 %      Sodium, Arterial 137.3 mmol/L      Potassium, Arterial 3.92 mmol/L      Ionized Calcium, Arterial 1.14 mmol/L      Chloride, Arterial 101 mmol/L      Glucose, Arterial 103 mg/dL      Lactate, Arterial 1.21 mmol/L      PO2/FIO2 309           Imaging:    XR Chest 1 View    Result Date: 2/19/2023  PROCEDURE: XR CHEST 1 VW  COMPARISON: [facility], CR, XR CHEST 1 VW, 2/09/2023, 3:58.  INDICATIONS: SOA Triage Protocol  FINDINGS:  The heart is normal in size.  The lungs are well-expanded and free of infiltrates.  Bony structures appear intact.       No active disease is seen.       MOE HAWKINS MD       Electronically Signed and Approved By: MOE HAWKINS MD on 2/19/2023 at 15:48                 Differential Diagnosis and Discussion:    Dyspnea: Differential diagnosis includes but is not limited to metabolic acidosis, neurological disorders, psychogenic, asthma, pneumothorax, upper airway obstruction, COPD, pneumonia, noncardiogenic pulmonary edema, interstitial lung disease, anemia, congestive heart failure, and pulmonary embolism    All labs were reviewed and interpreted by me.  All X-rays were independently reviewed by me.  EKG was interpreted by me.    MDM           This patient is a pleasant 47-year-old female now presenting with shortness of breath and wheezing and a cough.    I reviewed her notes from just last week in the ED where she was seen and started on some steroids and breathing treatments which initially helped but then symptoms worsen when she finished her prednisone course.    She is audibly  wheezing here and significantly in respiratory distress having difficulty speaking complete sentences.    We placed her on oxygen and I started giving her DuoNeb and albuterol breathing treatments and giving her IV Solu-Medrol for presumed asthma or COPD exacerbation or just bronchitis with bronchospasm.    I gave her a sublingual nitroglycerin for significant hypertension until I could rule out signs of CHF.    A work-up here otherwise was unremarkable with normal white blood cell count and negative troponin and normal BNP, and a normal chest x-ray.    Given her severe dyspnea on arrival I did check an arterial blood gas which was just noted to be mildly hypoxic but otherwise not showing any signs of respiratory depression.    On reassessment after the steroids and nebulized breathing treatment she looked markedly improved and does not have any oxygen requirement.    It looks like she can be safely discharged home at this time on a course of prednisone and nebulized breathing treatments and even some Z-Jose Cruz antibiotics.              Critical Care Note: Total Critical Care time of 35 minutes. Total critical care time documented does not include time spent on separately billed procedures for services of nurses or physician assistants. I personally saw and examined the patient. I have reviewed all diagnostic interpretations and treatment plans as written. I was present for the key portions of any procedures performed and the inclusive time noted in any critical care statement. Critical care time includes patient management by me, time spent at the patients bedside,  time to review lab and imaging results, discussing patient care, documentation in the medical record, and time spent with family or caregiver.    Patient Care Considerations:          Consultants/Shared Management Plan:    None    Social Determinants of Health:    Patient is independent, reliable, and has access to care.       Disposition and Care  Coordination:    Discharged: I considered escalation of care by admitting this patient to the hospital, however the patient has improved and is suitable and stable for discharge.    I have explained the patient´s condition, diagnoses and treatment plan based on the information available to me at this time. I have answered questions and addressed any concerns. The patient has a good  understanding of the patient´s diagnosis, condition, and treatment plan as can be expected at this point. The vital signs have been stable. The patient´s condition is stable and appropriate for discharge from the emergency department.      The patient will pursue further outpatient evaluation with the primary care physician or other designated or consulting physician as outlined in the discharge instructions. They are agreeable to this plan of care and follow-up instructions have been explained in detail. The patient has received these instructions in written format and have expressed an understanding of the discharge instructions. The patient is aware that any significant change in condition or worsening of symptoms should prompt an immediate return to this or the closest emergency department or call to 911.  I have explained discharge medications and the need for follow up with the patient/caretakers. This was also printed in the discharge instructions. Patient was discharged with the following medications and follow up:      Medication List      New Prescriptions    azithromycin 250 MG tablet  Commonly known as: Zithromax Z-Jose Cruz  Take 2 tablets by mouth on day 1, then 1 tablet daily on days 2-5     ipratropium-albuterol 0.5-2.5 mg/3 ml nebulizer  Commonly known as: DUO-NEB  Take 3 mL by nebulization Every 4 (Four) Hours As Needed for Wheezing.     predniSONE 20 MG tablet  Commonly known as: DELTASONE  Take 2 tablets by mouth Daily for 7 days.           Where to Get Your Medications      These medications were sent to [facility]/pharmacy  #[id number] - [place], [place] - [address] - [phone]  - [phone]  [address] [facility] [address]    Hours: 24-hours Phone: [phone]   · azithromycin 250 MG tablet  · ipratropium-albuterol 0.5-2.5 mg/3 ml nebulizer  · predniSONE 20 MG tablet      Bala Thompson, APRN  [address] [facility] [address]  [facility] [id number] [phone]    Call in 3 days  As needed, If symptoms worsen, for a follow-up appointment       Final diagnoses:   Acute bronchitis with bronchospasm   Shortness of breath        ED Disposition     ED Disposition   Discharge    Condition   Stable    Comment   --             This medical record created using voice recognition software.             Michael Sheffield  02/19/23 1529       Dequan Vanessa MD  02/19/23 3833

## 2023-02-19 NOTE — ED PROVIDER NOTES
Time: 3:24 PM EST  Date of encounter:  2/19/2023  Independent Historian/Clinical History and Information was obtained by:     Chief Complaint   Patient presents with   • Shortness of Breath       History is limited by:     History of Present Illness:  Patient is a 47 y.o. year old female who presents to the emergency department for evaluation of shortness of breath that is getting worse.  She was seen here on the ninth and diagnosed with bronchospasms and had an abnormal CT scan.  She was referred to pulmonology and has an appointment on March 28 however she cannot make it that long.  He has been been using her albuterol inhaler that was prescribed every hour at least.  She also says that she was prescribed some prednisone that did seem to make her feel better but as soon as it ran out, her symptoms returned.  She does appear to be struggling to breathe with pursed lip breathing and abdominal retractions.  She said it all started with a runny nose and she had taken 2 COVID test which were both negative at home.  She does not have a history of asthma.  She is 94% on room air.    HPI    Patient Care Team  Primary Care Provider: Bala Thompson APRN    Past Medical History:     No Known Allergies  Past Medical History:   Diagnosis Date   • Achilles tendon rupture, right, subsequent encounter    • Ankle pain, right    • Arthritis    • HTN (hypertension)      Past Surgical History:   Procedure Laterality Date   • ACHILLES TENDON SURGERY Right 3/18/2022    Procedure: ACHILLES TENDON REPAIR, BONE SPUR EXCISION;  Surgeon: Sergei Russ DPM;  Location: [facility];  Service: Podiatry;  Laterality: Right;   • LASIK  2011     Family History   Problem Relation Age of Onset   • Diabetes Mother    • Hypertension Mother    • Stroke Neg Hx        Home Medications:  Prior to Admission medications    Medication Sig Start Date End Date Taking? Authorizing Provider   albuterol sulfate  (90 Base) MCG/ACT inhaler  "Inhale 2 puffs Every 4 (Four) Hours As Needed for Wheezing. 23   Sandeep Rodriguez MD   diclofenac (VOLTAREN) 75 MG EC tablet TAKE 1 TABLET BY MOUTH TWICE DAILY 22   Sergei Russ DPM   losartan (COZAAR) 100 MG tablet TAKE 1 TABLET BY MOUTH EVERY DAY 22   Bala Thompson APRN   predniSONE (DELTASONE) 50 MG tablet Take 1 tablet by mouth Daily. 23   Sandeep Rodriguez MD        Social History:   Social History     Tobacco Use   • Smoking status: Former     Packs/day: 0.00     Years: 0.00     Pack years: 0.00     Types: Cigarettes     Quit date: 2012     Years since quittin.1   • Smokeless tobacco: Never   • Tobacco comments:     QUIT  WAS A SOCIAL SMOKER FOR 18 YEARS   Vaping Use   • Vaping Use: Never used   Substance Use Topics   • Alcohol use: Yes     Comment: RARELY   • Drug use: Never         Review of Systems:  Review of Systems   Constitutional: Positive for fever.   Respiratory: Positive for cough and shortness of breath.    Gastrointestinal: Negative for nausea and vomiting.        Physical Exam:  BP (!) 169/105 (BP Location: Left arm, Patient Position: Sitting)   Pulse 95   Temp 97.9 °F (36.6 °C) (Oral)   Resp 26   Ht 172.7 cm (68\")   Wt (!) 154 kg (340 lb 9.8 oz)   LMP 2023 (Exact Date)   SpO2 97%   Breastfeeding No   BMI 51.79 kg/m²     Physical Exam  Constitutional:       General: She is in acute distress.      Appearance: She is obese. She is ill-appearing and toxic-appearing.   Cardiovascular:      Rate and Rhythm: Normal rate.   Pulmonary:      Effort: Tachypnea, accessory muscle usage and respiratory distress present.   Neurological:      Mental Status: She is oriented to person, place, and time.                  Procedures:  Procedures      Medical Decision Making:      Comorbidities that affect care:        External Notes reviewed:          The following orders were placed and all results were independently analyzed by me:  Orders Placed This Encounter "   Procedures   • Blood Culture - Blood,   • Blood Culture - Blood,   • XR Chest 1 View   • [place] Draw   • Comprehensive Metabolic Panel   • BNP   • High Sensitivity Troponin T   • Lactic Acid, Plasma   • CBC Auto Differential   • NPO Diet NPO Type: Strict NPO   • Undress & Gown   • Cardiac Monitoring   • Vital Signs   • Undress & Gown   • Continuous Pulse Oximetry   • Vital Signs   • Oxygen Therapy- Nasal Cannula; 2 LPM; Titrate for SPO2: equal to or greater than, 92%   • Oxygen Therapy- Nasal Cannula; 2 LPM; Titrate for SPO2: 92%, Greater Than or Equal To   • ECG 12 Lead ED Triage Standing Order; SOA   • Insert Peripheral IV   • Insert Peripheral IV   • CBC & Differential   • Green Top (Gel)   • Lavender Top   • Gold Top - SST   • Light Blue Top       Medications Given in the Emergency Department:  Medications   sodium chloride 0.9 % flush 10 mL (has no administration in time range)   sodium chloride 0.9 % flush 10 mL (has no administration in time range)        ED Course:    The patient was initially evaluated in the triage area where orders were placed. The patient was later dispositioned by JAIME Gallardo.      The patient was advised to stay for completion of workup which includes but is not limited to communication of labs and radiological results, reassessment and plan. The patient was advised that leaving prior to disposition by a provider could result in critical findings that are not communicated to the patient.     ED Course as of 02/19/23 1524   Sun Feb 19, 2023   1523 EKG: I reviewed and interpreted her twelve-lead EKG as normal sinus rhythm at 94 bpm, normal P waves, QRS showing variation in amplitude, normal ST segments and T waves; no acute ischemia. [VS]      ED Course User Index  [VS] Dequan Vanessa MD       Labs:    Lab Results (last 24 hours)     ** No results found for the last 24 hours. **           Imaging:    No Radiology Exams Resulted Within Past 24 Hours      Differential Diagnosis  and Discussion:              MDM         Patient Care Considerations:          Consultants/Shared Management Plan:        Social Determinants of Health:          Disposition and Care Coordination:          Final diagnoses:   None        ED Disposition     None          This medical record created using voice recognition software.           Toyin Tolentino, APRN  02/20/23 0032

## 2023-02-22 LAB — QT INTERVAL: 365 MS

## 2023-02-24 LAB — BACTERIA SPEC AEROBE CULT: NORMAL

## 2023-02-28 ENCOUNTER — HOSPITAL ENCOUNTER (INPATIENT)
Facility: HOSPITAL | Age: 48
LOS: 2 days | Discharge: HOME | DRG: 202 | End: 2023-03-02
Attending: EMERGENCY MEDICINE | Admitting: FAMILY MEDICINE
Payer: MEDICAID

## 2023-02-28 ENCOUNTER — APPOINTMENT (OUTPATIENT)
Dept: GENERAL RADIOLOGY | Facility: HOSPITAL | Age: 48
DRG: 202 | End: 2023-02-28
Payer: MEDICAID

## 2023-02-28 ENCOUNTER — APPOINTMENT (OUTPATIENT)
Dept: CARDIOLOGY | Facility: HOSPITAL | Age: 48
DRG: 202 | End: 2023-02-28
Payer: MEDICAID

## 2023-02-28 DIAGNOSIS — J96.01 ACUTE RESPIRATORY FAILURE WITH HYPOXEMIA: Primary | ICD-10-CM

## 2023-02-28 LAB
A ALTERNATA IGE QN: <0.1 KU/L
A FUMIGATUS IGE QN: <0.1 KU/L
ALBUMIN SERPL-MCNC: 4.2 G/DL (ref 3.5–5.2)
ALBUMIN/GLOB SERPL: 1.4 G/DL
ALP SERPL-CCNC: 69 U/L (ref 39–117)
ALT SERPL W P-5'-P-CCNC: 39 U/L (ref 1–33)
ANION GAP SERPL CALCULATED.3IONS-SCNC: 10.9 MMOL/L (ref 5–15)
ARTERIAL PATENCY WRIST A: NEGATIVE
AST SERPL-CCNC: 30 U/L (ref 1–32)
B PARAPERT DNA SPEC QL NAA+PROBE: NOT DETECTED
B PERT DNA SPEC QL NAA+PROBE: NOT DETECTED
BASE EXCESS BLDA CALC-SCNC: 0.7 MMOL/L (ref -2–2)
BASOPHILS # BLD AUTO: 0.06 10*3/MM3 (ref 0–0.2)
BASOPHILS NFR BLD AUTO: 0.6 % (ref 0–1.5)
BDY SITE: ABNORMAL
BERMUDA GRASS IGE QN: <0.1 KU/L
BILIRUB SERPL-MCNC: 1 MG/DL (ref 0–1.2)
BOXELDER IGE QN: <0.1 KU/L
BUN SERPL-MCNC: 11 MG/DL (ref 6–20)
BUN/CREAT SERPL: 17.5 (ref 7–25)
C HERBARUM IGE QN: <0.1 KU/L
C PNEUM DNA NPH QL NAA+NON-PROBE: NOT DETECTED
CALCIUM SPEC-SCNC: 8.9 MG/DL (ref 8.6–10.5)
CALIF WALNUT IGE QN: <0.1 KU/L
CAT DANDER IGE QN: <0.1 KU/L
CHLORIDE SERPL-SCNC: 100 MMOL/L (ref 98–107)
CMN PIGWEED IGE QN: <0.1 KU/L
CO2 SERPL-SCNC: 27.1 MMOL/L (ref 22–29)
COHGB MFR BLD: 1 % (ref 0–1.5)
COMMON RAGWEED IGE QN: <0.1 KU/L
COTTONWOOD IGE QN: <0.1 KU/L
CREAT SERPL-MCNC: 0.63 MG/DL (ref 0.57–1)
D DIMER PPP FEU-MCNC: 0.42 MCGFEU/ML (ref 0–0.5)
D FARINAE IGE QN: <0.1 KU/L
D PTERONYSS IGE QN: <0.1 KU/L
D-LACTATE SERPL-SCNC: 1.6 MMOL/L (ref 0.5–2)
DEPRECATED RDW RBC AUTO: 41 FL (ref 37–54)
DOG DANDER IGE QN: 0.12 KU/L
EGFRCR SERPLBLD CKD-EPI 2021: 108.9 ML/MIN/1.73
EOSINOPHIL # BLD AUTO: 0.78 10*3/MM3 (ref 0–0.4)
EOSINOPHIL NFR BLD AUTO: 7.6 % (ref 0.3–6.2)
ERYTHROCYTE [DISTWIDTH] IN BLOOD BY AUTOMATED COUNT: 13.9 % (ref 12.3–15.4)
FHHB: 9.9 % (ref 0–5)
FLUAV AG NPH QL: NEGATIVE
FLUAV SUBTYP SPEC NAA+PROBE: NOT DETECTED
FLUBV AG NPH QL IA: NEGATIVE
FLUBV RNA ISLT QL NAA+PROBE: NOT DETECTED
GAS FLOW AIRWAY: 0 LPM
GEN 5 2HR TROPONIN T REFLEX: 13 NG/L
GLOBULIN UR ELPH-MCNC: 3.1 GM/DL
GLUCOSE SERPL-MCNC: 110 MG/DL (ref 65–99)
GOOSEFOOT IGE QN: <0.1 KU/L
HADV DNA SPEC NAA+PROBE: NOT DETECTED
HCO3 BLDA-SCNC: 25 MMOL/L (ref 22–26)
HCOV 229E RNA SPEC QL NAA+PROBE: NOT DETECTED
HCOV HKU1 RNA SPEC QL NAA+PROBE: NOT DETECTED
HCOV NL63 RNA SPEC QL NAA+PROBE: NOT DETECTED
HCOV OC43 RNA SPEC QL NAA+PROBE: NOT DETECTED
HCT VFR BLD AUTO: 40.3 % (ref 34–46.6)
HGB BLD-MCNC: 13.4 G/DL (ref 12–15.9)
HGB BLDA-MCNC: 14.4 G/DL (ref 11.7–14.6)
HMPV RNA NPH QL NAA+NON-PROBE: NOT DETECTED
HOLD SPECIMEN: NORMAL
HOLD SPECIMEN: NORMAL
HPIV1 RNA ISLT QL NAA+PROBE: NOT DETECTED
HPIV2 RNA SPEC QL NAA+PROBE: NOT DETECTED
HPIV3 RNA NPH QL NAA+PROBE: NOT DETECTED
HPIV4 P GENE NPH QL NAA+PROBE: NOT DETECTED
IGE SERPL-ACNC: 38.5 KU/L
IGE SERPL-ACNC: 42.9 KU/L
IMM GRANULOCYTES # BLD AUTO: 0.09 10*3/MM3 (ref 0–0.05)
IMM GRANULOCYTES NFR BLD AUTO: 0.9 % (ref 0–0.5)
INHALED O2 CONCENTRATION: 21 %
JOHNSON GRASS IGE QN: <0.1 KU/L
KENT BLUE GRASS IGE QN: <0.1 KU/L
LACTATE BLDA-SCNC: ABNORMAL MMOL/L
LONDON PLANE IGE QN: <0.1 KU/L
LYMPHOCYTES # BLD AUTO: 1.42 10*3/MM3 (ref 0.7–3.1)
LYMPHOCYTES NFR BLD AUTO: 13.9 % (ref 19.6–45.3)
M PNEUMO IGG SER IA-ACNC: NOT DETECTED
MAGNESIUM SERPL-MCNC: 1.9 MG/DL (ref 1.6–2.6)
MCH RBC QN AUTO: 27.6 PG (ref 26.6–33)
MCHC RBC AUTO-ENTMCNC: 33.3 G/DL (ref 31.5–35.7)
MCV RBC AUTO: 83.1 FL (ref 79–97)
METHGB BLD QL: 0.2 % (ref 0–1.5)
MODALITY: ABNORMAL
MONOCYTES # BLD AUTO: 0.52 10*3/MM3 (ref 0.1–0.9)
MONOCYTES NFR BLD AUTO: 5.1 % (ref 5–12)
MOUSE URINE PROT IGE QN: <0.1 KU/L
MT JUNIPER IGE QN: <0.1 KU/L
NEUTROPHILS NFR BLD AUTO: 7.34 10*3/MM3 (ref 1.7–7)
NEUTROPHILS NFR BLD AUTO: 71.9 % (ref 42.7–76)
NRBC BLD AUTO-RTO: 0 /100 WBC (ref 0–0.2)
NT-PROBNP SERPL-MCNC: <36 PG/ML (ref 0–450)
OXYHGB MFR BLDV: 88.9 % (ref 94–99)
P NOTATUM IGE QN: <0.1 KU/L
PCO2 BLDA: 39.3 MM HG (ref 35–45)
PECAN/HICK TREE IGE QN: <0.1 KU/L
PH BLDA: 7.42 PH UNITS (ref 7.35–7.45)
PLATELET # BLD AUTO: 268 10*3/MM3 (ref 140–450)
PMV BLD AUTO: 9.6 FL (ref 6–12)
PO2 BLD: 277 MM[HG] (ref 0–500)
PO2 BLDA: 58.2 MM HG (ref 80–100)
POTASSIUM SERPL-SCNC: 3.9 MMOL/L (ref 3.5–5.2)
PROCALCITONIN SERPL-MCNC: 0.06 NG/ML (ref 0–0.25)
PROT SERPL-MCNC: 7.3 G/DL (ref 6–8.5)
QT INTERVAL: 350 MS
RBC # BLD AUTO: 4.85 10*6/MM3 (ref 3.77–5.28)
RHINOVIRUS RNA SPEC NAA+PROBE: DETECTED
ROACH IGE QN: <0.1 KU/L
RSV AG SPEC QL: NEGATIVE
RSV RNA NPH QL NAA+NON-PROBE: NOT DETECTED
SAO2 % BLDCOA: 90 % (ref 95–99)
SARS-COV-2 RNA RESP QL NAA+PROBE: NOT DETECTED
SARS-COV-2 RNA RESP QL NAA+PROBE: NOT DETECTED
SILVER BIRCH IGE QN: <0.1 KU/L
SODIUM SERPL-SCNC: 138 MMOL/L (ref 136–145)
TIMOTHY IGE QN: <0.1 KU/L
TROPONIN T DELTA: -4 NG/L
TROPONIN T SERPL HS-MCNC: 17 NG/L
TROPONIN T SERPL HS-MCNC: 17 NG/L
WBC NRBC COR # BLD: 10.21 10*3/MM3 (ref 3.4–10.8)
WHITE ASH IGE QN: <0.1 KU/L
WHITE ELM IGE QN: <0.1 KU/L
WHITE MULBERRY IGE QN: <0.1 KU/L
WHITE OAK IGE QN: <0.1 KU/L
WHOLE BLOOD HOLD COAG: NORMAL
WHOLE BLOOD HOLD SPECIMEN: NORMAL

## 2023-02-28 PROCEDURE — 85025 COMPLETE CBC W/AUTO DIFF WBC: CPT | Performed by: EMERGENCY MEDICINE

## 2023-02-28 PROCEDURE — 83880 ASSAY OF NATRIURETIC PEPTIDE: CPT | Performed by: EMERGENCY MEDICINE

## 2023-02-28 PROCEDURE — 86003 ALLG SPEC IGE CRUDE XTRC EA: CPT | Performed by: STUDENT IN AN ORGANIZED HEALTH CARE EDUCATION/TRAINING PROGRAM

## 2023-02-28 PROCEDURE — 94799 UNLISTED PULMONARY SVC/PX: CPT

## 2023-02-28 PROCEDURE — 93308 TTE F-UP OR LMTD: CPT

## 2023-02-28 PROCEDURE — 93325 DOPPLER ECHO COLOR FLOW MAPG: CPT | Performed by: SPECIALIST

## 2023-02-28 PROCEDURE — 63710000001 PREDNISONE PER 1 MG: Performed by: NURSE PRACTITIONER

## 2023-02-28 PROCEDURE — 99254 IP/OBS CNSLTJ NEW/EST MOD 60: CPT | Performed by: STUDENT IN AN ORGANIZED HEALTH CARE EDUCATION/TRAINING PROGRAM

## 2023-02-28 PROCEDURE — 94760 N-INVAS EAR/PLS OXIMETRY 1: CPT

## 2023-02-28 PROCEDURE — 93005 ELECTROCARDIOGRAM TRACING: CPT | Performed by: EMERGENCY MEDICINE

## 2023-02-28 PROCEDURE — 87804 INFLUENZA ASSAY W/OPTIC: CPT | Performed by: EMERGENCY MEDICINE

## 2023-02-28 PROCEDURE — 80053 COMPREHEN METABOLIC PANEL: CPT | Performed by: EMERGENCY MEDICINE

## 2023-02-28 PROCEDURE — 25010000002 METHYLPREDNISOLONE PER 125 MG: Performed by: EMERGENCY MEDICINE

## 2023-02-28 PROCEDURE — 82785 ASSAY OF IGE: CPT | Performed by: STUDENT IN AN ORGANIZED HEALTH CARE EDUCATION/TRAINING PROGRAM

## 2023-02-28 PROCEDURE — 83605 ASSAY OF LACTIC ACID: CPT | Performed by: EMERGENCY MEDICINE

## 2023-02-28 PROCEDURE — 71045 X-RAY EXAM CHEST 1 VIEW: CPT

## 2023-02-28 PROCEDURE — U0004 COV-19 TEST NON-CDC HGH THRU: HCPCS | Performed by: EMERGENCY MEDICINE

## 2023-02-28 PROCEDURE — 93005 ELECTROCARDIOGRAM TRACING: CPT

## 2023-02-28 PROCEDURE — 85379 FIBRIN DEGRADATION QUANT: CPT | Performed by: STUDENT IN AN ORGANIZED HEALTH CARE EDUCATION/TRAINING PROGRAM

## 2023-02-28 PROCEDURE — 94761 N-INVAS EAR/PLS OXIMETRY MLT: CPT

## 2023-02-28 PROCEDURE — 93325 DOPPLER ECHO COLOR FLOW MAPG: CPT

## 2023-02-28 PROCEDURE — 94640 AIRWAY INHALATION TREATMENT: CPT

## 2023-02-28 PROCEDURE — 87040 BLOOD CULTURE FOR BACTERIA: CPT | Performed by: EMERGENCY MEDICINE

## 2023-02-28 PROCEDURE — 84484 ASSAY OF TROPONIN QUANT: CPT | Performed by: FAMILY MEDICINE

## 2023-02-28 PROCEDURE — 86225 DNA ANTIBODY NATIVE: CPT | Performed by: STUDENT IN AN ORGANIZED HEALTH CARE EDUCATION/TRAINING PROGRAM

## 2023-02-28 PROCEDURE — 93010 ELECTROCARDIOGRAM REPORT: CPT | Performed by: INTERNAL MEDICINE

## 2023-02-28 PROCEDURE — 93321 DOPPLER ECHO F-UP/LMTD STD: CPT

## 2023-02-28 PROCEDURE — 83735 ASSAY OF MAGNESIUM: CPT | Performed by: FAMILY MEDICINE

## 2023-02-28 PROCEDURE — 84145 PROCALCITONIN (PCT): CPT | Performed by: NURSE PRACTITIONER

## 2023-02-28 PROCEDURE — 36415 COLL VENOUS BLD VENIPUNCTURE: CPT | Performed by: FAMILY MEDICINE

## 2023-02-28 PROCEDURE — 25010000002 SULFUR HEXAFLUORIDE MICROSPH 60.7-25 MG RECONSTITUTED SUSPENSION: Performed by: FAMILY MEDICINE

## 2023-02-28 PROCEDURE — 0202U NFCT DS 22 TRGT SARS-COV-2: CPT | Performed by: STUDENT IN AN ORGANIZED HEALTH CARE EDUCATION/TRAINING PROGRAM

## 2023-02-28 PROCEDURE — 86038 ANTINUCLEAR ANTIBODIES: CPT | Performed by: STUDENT IN AN ORGANIZED HEALTH CARE EDUCATION/TRAINING PROGRAM

## 2023-02-28 PROCEDURE — 82805 BLOOD GASES W/O2 SATURATION: CPT | Performed by: EMERGENCY MEDICINE

## 2023-02-28 PROCEDURE — 94660 CPAP INITIATION&MGMT: CPT

## 2023-02-28 PROCEDURE — 99285 EMERGENCY DEPT VISIT HI MDM: CPT

## 2023-02-28 PROCEDURE — 83050 HGB METHEMOGLOBIN QUAN: CPT | Performed by: EMERGENCY MEDICINE

## 2023-02-28 PROCEDURE — 99223 1ST HOSP IP/OBS HIGH 75: CPT | Performed by: FAMILY MEDICINE

## 2023-02-28 PROCEDURE — 82375 ASSAY CARBOXYHB QUANT: CPT | Performed by: EMERGENCY MEDICINE

## 2023-02-28 PROCEDURE — 84484 ASSAY OF TROPONIN QUANT: CPT | Performed by: EMERGENCY MEDICINE

## 2023-02-28 PROCEDURE — 87807 RSV ASSAY W/OPTIC: CPT | Performed by: EMERGENCY MEDICINE

## 2023-02-28 PROCEDURE — 82785 ASSAY OF IGE: CPT | Performed by: NURSE PRACTITIONER

## 2023-02-28 PROCEDURE — 36600 WITHDRAWAL OF ARTERIAL BLOOD: CPT | Performed by: EMERGENCY MEDICINE

## 2023-02-28 PROCEDURE — 93321 DOPPLER ECHO F-UP/LMTD STD: CPT | Performed by: SPECIALIST

## 2023-02-28 PROCEDURE — 93308 TTE F-UP OR LMTD: CPT | Performed by: SPECIALIST

## 2023-02-28 RX ORDER — SODIUM CHLORIDE 0.9 % (FLUSH) 0.9 %
10 SYRINGE (ML) INJECTION AS NEEDED
Status: DISCONTINUED | OUTPATIENT
Start: 2023-02-28 | End: 2023-03-02 | Stop reason: HOSPADM

## 2023-02-28 RX ORDER — BISACODYL 10 MG
10 SUPPOSITORY, RECTAL RECTAL DAILY PRN
Status: DISCONTINUED | OUTPATIENT
Start: 2023-02-28 | End: 2023-03-02 | Stop reason: HOSPADM

## 2023-02-28 RX ORDER — ONDANSETRON 4 MG/1
4 TABLET, FILM COATED ORAL EVERY 6 HOURS PRN
Status: DISCONTINUED | OUTPATIENT
Start: 2023-02-28 | End: 2023-03-02 | Stop reason: HOSPADM

## 2023-02-28 RX ORDER — PREDNISONE 10 MG/1
10 TABLET ORAL
Status: DISCONTINUED | OUTPATIENT
Start: 2023-03-21 | End: 2023-03-02 | Stop reason: HOSPADM

## 2023-02-28 RX ORDER — PREDNISONE 20 MG/1
20 TABLET ORAL DAILY
Status: DISCONTINUED | OUTPATIENT
Start: 2023-03-14 | End: 2023-03-02 | Stop reason: HOSPADM

## 2023-02-28 RX ORDER — IPRATROPIUM BROMIDE AND ALBUTEROL SULFATE 2.5; .5 MG/3ML; MG/3ML
3 SOLUTION RESPIRATORY (INHALATION) ONCE
Status: COMPLETED | OUTPATIENT
Start: 2023-02-28 | End: 2023-02-28

## 2023-02-28 RX ORDER — LOSARTAN POTASSIUM 25 MG/1
100 TABLET ORAL DAILY
Status: DISCONTINUED | OUTPATIENT
Start: 2023-03-01 | End: 2023-03-02 | Stop reason: HOSPADM

## 2023-02-28 RX ORDER — ONDANSETRON 2 MG/ML
4 INJECTION INTRAMUSCULAR; INTRAVENOUS EVERY 6 HOURS PRN
Status: DISCONTINUED | OUTPATIENT
Start: 2023-02-28 | End: 2023-03-02 | Stop reason: HOSPADM

## 2023-02-28 RX ORDER — ALBUTEROL SULFATE 2.5 MG/3ML
2.5 SOLUTION RESPIRATORY (INHALATION)
Status: DISCONTINUED | OUTPATIENT
Start: 2023-02-28 | End: 2023-03-02 | Stop reason: HOSPADM

## 2023-02-28 RX ORDER — IPRATROPIUM BROMIDE AND ALBUTEROL SULFATE 2.5; .5 MG/3ML; MG/3ML
3 SOLUTION RESPIRATORY (INHALATION)
Status: DISCONTINUED | OUTPATIENT
Start: 2023-02-28 | End: 2023-03-01

## 2023-02-28 RX ORDER — SODIUM CHLORIDE 0.9 % (FLUSH) 0.9 %
10 SYRINGE (ML) INJECTION EVERY 12 HOURS SCHEDULED
Status: DISCONTINUED | OUTPATIENT
Start: 2023-02-28 | End: 2023-03-02 | Stop reason: HOSPADM

## 2023-02-28 RX ORDER — BUDESONIDE 0.25 MG/2ML
0.25 INHALANT ORAL
Status: DISCONTINUED | OUTPATIENT
Start: 2023-02-28 | End: 2023-03-02 | Stop reason: HOSPADM

## 2023-02-28 RX ORDER — BISACODYL 5 MG/1
5 TABLET, DELAYED RELEASE ORAL DAILY PRN
Status: DISCONTINUED | OUTPATIENT
Start: 2023-02-28 | End: 2023-03-02 | Stop reason: HOSPADM

## 2023-02-28 RX ORDER — ARFORMOTEROL TARTRATE 15 UG/2ML
15 SOLUTION RESPIRATORY (INHALATION)
Status: DISCONTINUED | OUTPATIENT
Start: 2023-02-28 | End: 2023-03-02 | Stop reason: HOSPADM

## 2023-02-28 RX ORDER — ALBUTEROL SULFATE 90 UG/1
2 AEROSOL, METERED RESPIRATORY (INHALATION) EVERY 4 HOURS PRN
Status: ON HOLD | COMMUNITY
End: 2023-03-02 | Stop reason: SDUPTHER

## 2023-02-28 RX ORDER — PREDNISONE 20 MG/1
40 TABLET ORAL
Status: DISCONTINUED | OUTPATIENT
Start: 2023-03-01 | End: 2023-02-28

## 2023-02-28 RX ORDER — METHYLPREDNISOLONE SODIUM SUCCINATE 125 MG/2ML
125 INJECTION, POWDER, LYOPHILIZED, FOR SOLUTION INTRAMUSCULAR; INTRAVENOUS ONCE
Status: COMPLETED | OUTPATIENT
Start: 2023-02-28 | End: 2023-02-28

## 2023-02-28 RX ORDER — POLYETHYLENE GLYCOL 3350 17 G/17G
17 POWDER, FOR SOLUTION ORAL DAILY PRN
Status: DISCONTINUED | OUTPATIENT
Start: 2023-02-28 | End: 2023-03-02 | Stop reason: HOSPADM

## 2023-02-28 RX ORDER — SODIUM CHLORIDE 9 MG/ML
40 INJECTION, SOLUTION INTRAVENOUS AS NEEDED
Status: DISCONTINUED | OUTPATIENT
Start: 2023-02-28 | End: 2023-03-02 | Stop reason: HOSPADM

## 2023-02-28 RX ORDER — ACETAMINOPHEN 325 MG/1
650 TABLET ORAL EVERY 4 HOURS PRN
Status: DISCONTINUED | OUTPATIENT
Start: 2023-02-28 | End: 2023-03-02 | Stop reason: HOSPADM

## 2023-02-28 RX ORDER — AMOXICILLIN 250 MG
2 CAPSULE ORAL 2 TIMES DAILY PRN
Status: DISCONTINUED | OUTPATIENT
Start: 2023-02-28 | End: 2023-03-02 | Stop reason: HOSPADM

## 2023-02-28 RX ORDER — PREDNISONE 20 MG/1
40 TABLET ORAL DAILY
Status: DISCONTINUED | OUTPATIENT
Start: 2023-02-28 | End: 2023-03-02 | Stop reason: HOSPADM

## 2023-02-28 RX ADMIN — IPRATROPIUM BROMIDE AND ALBUTEROL SULFATE 3 ML: 2.5; .5 SOLUTION RESPIRATORY (INHALATION) at 08:55

## 2023-02-28 RX ADMIN — IPRATROPIUM BROMIDE AND ALBUTEROL SULFATE 3 ML: 2.5; .5 SOLUTION RESPIRATORY (INHALATION) at 23:22

## 2023-02-28 RX ADMIN — Medication 10 ML: at 15:28

## 2023-02-28 RX ADMIN — BUDESONIDE 0.25 MG: 0.25 SUSPENSION RESPIRATORY (INHALATION) at 19:35

## 2023-02-28 RX ADMIN — ACETAMINOPHEN 650 MG: 325 TABLET ORAL at 20:00

## 2023-02-28 RX ADMIN — PREDNISONE 40 MG: 20 TABLET ORAL at 16:25

## 2023-02-28 RX ADMIN — SULFUR HEXAFLUORIDE 2 ML: KIT at 19:10

## 2023-02-28 RX ADMIN — IPRATROPIUM BROMIDE AND ALBUTEROL SULFATE 3 ML: 2.5; .5 SOLUTION RESPIRATORY (INHALATION) at 19:34

## 2023-02-28 RX ADMIN — METHYLPREDNISOLONE SODIUM SUCCINATE 125 MG: 125 INJECTION, POWDER, FOR SOLUTION INTRAMUSCULAR; INTRAVENOUS at 09:18

## 2023-02-28 RX ADMIN — IPRATROPIUM BROMIDE AND ALBUTEROL SULFATE 3 ML: 2.5; .5 SOLUTION RESPIRATORY (INHALATION) at 11:39

## 2023-02-28 RX ADMIN — IPRATROPIUM BROMIDE AND ALBUTEROL SULFATE 3 ML: 2.5; .5 SOLUTION RESPIRATORY (INHALATION) at 08:54

## 2023-02-28 RX ADMIN — IPRATROPIUM BROMIDE AND ALBUTEROL SULFATE 3 ML: 2.5; .5 SOLUTION RESPIRATORY (INHALATION) at 14:51

## 2023-02-28 RX ADMIN — ARFORMOTEROL TARTRATE 15 MCG: 15 SOLUTION RESPIRATORY (INHALATION) at 19:34

## 2023-02-28 RX ADMIN — Medication 10 ML: at 20:01

## 2023-03-01 LAB
BASOPHILS # BLD AUTO: 0.03 10*3/MM3 (ref 0–0.2)
BASOPHILS NFR BLD AUTO: 0.2 % (ref 0–1.5)
BH CV ECHO MEAS - AO ROOT DIAM: 3.4 CM
BH CV ECHO MEAS - EF(MOD-BP): 65 %
BH CV ECHO MEAS - IVSD: 1 CM
BH CV ECHO MEAS - LA DIMENSION: 3.1 CM
BH CV ECHO MEAS - LAT PEAK E' VEL: 7.7 CM/SEC
BH CV ECHO MEAS - LVIDD: 5.3 CM
BH CV ECHO MEAS - LVIDS: 3.5 CM
BH CV ECHO MEAS - LVPWD: 1 CM
BH CV ECHO MEAS - MED PEAK E' VEL: 6 CM/SEC
BH CV ECHO MEAS - MV A MAX VEL: 92.7 CM/SEC
BH CV ECHO MEAS - MV DEC TIME: 243 MSEC
BH CV ECHO MEAS - MV E MAX VEL: 77.1 CM/SEC
BH CV ECHO MEAS - MV E/A: 0.8
BH CV ECHO MEAS - RVDD: 3.1 CM
BH CV ECHO MEASUREMENTS AVERAGE E/E' RATIO: 11.26
DEPRECATED RDW RBC AUTO: 40.4 FL (ref 37–54)
EOSINOPHIL # BLD AUTO: 0.02 10*3/MM3 (ref 0–0.4)
EOSINOPHIL NFR BLD AUTO: 0.1 % (ref 0.3–6.2)
ERYTHROCYTE [DISTWIDTH] IN BLOOD BY AUTOMATED COUNT: 13.8 % (ref 12.3–15.4)
HCT VFR BLD AUTO: 37.7 % (ref 34–46.6)
HGB BLD-MCNC: 12.9 G/DL (ref 12–15.9)
HOLD SPECIMEN: NORMAL
IMM GRANULOCYTES # BLD AUTO: 0.25 10*3/MM3 (ref 0–0.05)
IMM GRANULOCYTES NFR BLD AUTO: 1.6 % (ref 0–0.5)
LYMPHOCYTES # BLD AUTO: 1.31 10*3/MM3 (ref 0.7–3.1)
LYMPHOCYTES NFR BLD AUTO: 8.3 % (ref 19.6–45.3)
MAXIMAL PREDICTED HEART RATE: 171 BPM
MCH RBC QN AUTO: 28.3 PG (ref 26.6–33)
MCHC RBC AUTO-ENTMCNC: 34.2 G/DL (ref 31.5–35.7)
MCV RBC AUTO: 82.7 FL (ref 79–97)
MONOCYTES # BLD AUTO: 0.62 10*3/MM3 (ref 0.1–0.9)
MONOCYTES NFR BLD AUTO: 3.9 % (ref 5–12)
NEUTROPHILS NFR BLD AUTO: 13.56 10*3/MM3 (ref 1.7–7)
NEUTROPHILS NFR BLD AUTO: 85.9 % (ref 42.7–76)
NRBC BLD AUTO-RTO: 0 /100 WBC (ref 0–0.2)
PLATELET # BLD AUTO: 275 10*3/MM3 (ref 140–450)
PMV BLD AUTO: 9.5 FL (ref 6–12)
RBC # BLD AUTO: 4.56 10*6/MM3 (ref 3.77–5.28)
STRESS TARGET HR: 145 BPM
WBC NRBC COR # BLD: 15.79 10*3/MM3 (ref 3.4–10.8)

## 2023-03-01 PROCEDURE — 99233 SBSQ HOSP IP/OBS HIGH 50: CPT | Performed by: STUDENT IN AN ORGANIZED HEALTH CARE EDUCATION/TRAINING PROGRAM

## 2023-03-01 PROCEDURE — 63710000001 PREDNISONE PER 1 MG: Performed by: NURSE PRACTITIONER

## 2023-03-01 PROCEDURE — 94761 N-INVAS EAR/PLS OXIMETRY MLT: CPT

## 2023-03-01 PROCEDURE — 94799 UNLISTED PULMONARY SVC/PX: CPT

## 2023-03-01 PROCEDURE — 94760 N-INVAS EAR/PLS OXIMETRY 1: CPT

## 2023-03-01 PROCEDURE — 99233 SBSQ HOSP IP/OBS HIGH 50: CPT | Performed by: FAMILY MEDICINE

## 2023-03-01 PROCEDURE — 85025 COMPLETE CBC W/AUTO DIFF WBC: CPT | Performed by: FAMILY MEDICINE

## 2023-03-01 PROCEDURE — 94664 DEMO&/EVAL PT USE INHALER: CPT

## 2023-03-01 PROCEDURE — 94660 CPAP INITIATION&MGMT: CPT

## 2023-03-01 RX ORDER — IPRATROPIUM BROMIDE AND ALBUTEROL SULFATE 2.5; .5 MG/3ML; MG/3ML
3 SOLUTION RESPIRATORY (INHALATION) EVERY 4 HOURS PRN
Status: DISCONTINUED | OUTPATIENT
Start: 2023-03-01 | End: 2023-03-02 | Stop reason: HOSPADM

## 2023-03-01 RX ADMIN — ARFORMOTEROL TARTRATE 15 MCG: 15 SOLUTION RESPIRATORY (INHALATION) at 07:21

## 2023-03-01 RX ADMIN — BUDESONIDE 0.25 MG: 0.25 SUSPENSION RESPIRATORY (INHALATION) at 18:30

## 2023-03-01 RX ADMIN — IPRATROPIUM BROMIDE AND ALBUTEROL SULFATE 3 ML: 2.5; .5 SOLUTION RESPIRATORY (INHALATION) at 07:21

## 2023-03-01 RX ADMIN — LOSARTAN POTASSIUM 100 MG: 25 TABLET, FILM COATED ORAL at 08:55

## 2023-03-01 RX ADMIN — ARFORMOTEROL TARTRATE 15 MCG: 15 SOLUTION RESPIRATORY (INHALATION) at 18:29

## 2023-03-01 RX ADMIN — PREDNISONE 40 MG: 20 TABLET ORAL at 08:55

## 2023-03-01 RX ADMIN — Medication 10 ML: at 08:56

## 2023-03-01 RX ADMIN — BUDESONIDE 0.25 MG: 0.25 SUSPENSION RESPIRATORY (INHALATION) at 07:21

## 2023-03-01 RX ADMIN — Medication 10 ML: at 20:25

## 2023-03-01 NOTE — CASE MANAGEMENT/SOCIAL WORK
Discharge Planning Assessment   [place]     Patient Name: Mere Ivory  MRN: [id number]  Today's Date: 3/1/2023    Admit Date: 2/28/2023     Discharge Needs Assessment     Row Name 03/01/23 1018       Living Environment    People in Home spouse    Name(s) of People in Home Pt lives in [facility] with her , Hakeem JORGE    Current Living Arrangements home    Potentially Unsafe Housing Conditions none    Primary Care Provided by self    Provides Primary Care For no one    Family Caregiver if Needed spouse    Family Caregiver Names Hakeem Ivory- Spouse    Quality of Family Relationships supportive;involved;helpful    Able to Return to Prior Arrangements yes       Resource/Environmental Concerns    Resource/Environmental Concerns none       Food Insecurity    Within the past 12 months, you worried that your food would run out before you got the money to buy more. Never true    Within the past 12 months, the food you bought just didn't last and you didn't have money to get more. Never true       Transition Planning    Patient/Family Anticipates Transition to home    Patient/Family Anticipated Services at Transition durable medical equipment    Transportation Anticipated family or friend will provide;car, drives self       Discharge Needs Assessment    Readmission Within the Last 30 Days no previous admission in last 30 days    Equipment Currently Used at Home nebulizer    Concerns to be Addressed denies needs/concerns at this time    Anticipated Changes Related to Illness none    Equipment Needed After Discharge oxygen;bipap               Discharge Plan     Row Name 03/01/23 1020       Plan    Plan THERESA spoke with pt to assess needs. Pt admitted due to respiratory failure. Pt lives at home with her  and reports independence in ADLs. Pt expresses concerns with shortness of breath lately- does not use 02 or sleep machine at baseline. Pt reports she recently was given a nebulizer. THERESA confirmed pharmacy  and PCP. Pt used to see Bala Thompson, however she had to obtain [facility] Medicaid, due to inability to work, and was assigned a new PCP. Pt reports issues with working due to medical concerns at this time. Pt denies financial issues at this time. Pt does plan to return home at discharge and denies the need for rehab/HHC. Will likely need respiratory equipment arranged. Will follow.    Patient/Family in Agreement with Plan yes               Demographic Summary     Row Name 03/01/23 1013       General Information    Admission Type inpatient    Arrived From emergency department    Referral Source admission list    Reason for Consult discharge planning    Preferred Language English       Contact Information    Permission Granted to Share Info With family/designee               Functional Status     Row Name 03/01/23 1013       Functional Status    Usual Activity Tolerance good    Current Activity Tolerance good       Physical Activity    On average, how many days per week do you engage in moderate to strenuous exercise (like a brisk walk)? 7 days    On average, how many minutes do you engage in exercise at this level? 90 min    Number of minutes of exercise per week 630       Functional Status, IADL    Medications independent    Meal Preparation independent    Housekeeping independent    Laundry independent    Shopping independent       Mental Status    General Appearance WDL WDL       Mental Status Summary    Recent Changes in Mental Status/Cognitive Functioning no changes               Psychosocial     Row Name 03/01/23 1015       Behavior WDL    Behavior WDL WDL       Emotion Mood WDL    Emotion/Mood/Affect WDL WDL       Speech WDL    Speech WDL WDL       Perceptual State WDL    Perceptual State WDL WDL       Thought Process WDL    Thought Process WDL WDL       Intellectual Performance WDL    Intellectual Performance WDL WDL       Coping/Stress    Major Change/Loss/Stressor none    Patient Personal Strengths positive  attitude    Sources of Support spouse    Techniques to [place] with Loss/Stress/Change not applicable    Reaction to Health Status accepting    Understanding of Condition and Treatment adequate understanding of medical condition       Developmental Stage (Eriksson's)    Developmental Stage Stage 7 (35-65 years/Middle Adulthood) Generativity vs. Stagnation            TERRI Mcnair

## 2023-03-01 NOTE — H&P
[facility]   HOSPITALIST HISTORY AND PHYSICAL  Date: 2023   Patient Name: Mere Ivory  : 1975  MRN: [id number]  Primary Care Physician:  Shubham Alejandro APRN  Date of admission: 2023    Subjective   Subjective     Chief Complaint: Shortness of breath    HPI:    Mere Ivory is a 47 y.o. female past medical history of hypertension obesity BMI 52 presents with acute shortness of breath started on the morning of presentation.  Patient has been having intermittent shortness of breath over the past month has been treated with steroids and breathing treatments with improvement until patient completes course of steroids when shortness of breath recurs.  Patient endorses nonproductive cough, denies fever.  Patient denies recent travel.  Patient states that started as a postnasal drip and sinus congestion earlier in the month when she and her  both had head colds.  On the morning of presentation patient states she was unable to catch her breath or even take a couple steps due to shortness of breath.  Shortness of breath not improved with breathing treatments.  Patient presented to the emergency department for further evaluation and treatment.  On presentation patient found to be in respiratory distress with diffuse wheezing bilaterally.  Patient given Solu-Medrol and breathing treatment with some improvement.  ABG demonstrated hypoxia.  Patient started on 2 L nasal cannula to keep sats greater than 90%.  Chest x-ray negative for any acute infiltrate.  Flu RSV and COVID-negative.  D-dimer within normal limits.  Pro-Ramesh within normal limits.  Troponin mildly elevated and trending down.  Patient denies chest pain.  Suspect due to hypoxia. Pulmonology contacted by ED and agreed to consult.  Hospitalist service contacted for admission for further evaluation and treatment of suspected asthma with acute exacerbation and hypoxic respiratory failure.  Continue on inhaled  bronchodilators and systemic steroids.        Personal History     Past Medical History:  Past Medical History:   Diagnosis Date   • Achilles tendon rupture, right, subsequent encounter    • Ankle pain, right    • Arthritis    • HTN (hypertension)         Past Surgical History:  Past Surgical History:   Procedure Laterality Date   • ACHILLES TENDON SURGERY Right 3/18/2022    Procedure: ACHILLES TENDON REPAIR, BONE SPUR EXCISION;  Surgeon: Sergei Russ DPM;  Location: [facility] OR;  Service: Podiatry;  Laterality: Right;   • LASIK          Family History:   Family History   Problem Relation Age of Onset   • Diabetes Mother    • Hypertension Mother    • Stroke Neg Hx         Social History:   Social History     Socioeconomic History   • Marital status:    Tobacco Use   • Smoking status: Former     Packs/day: 0.00     Years: 0.00     Pack years: 0.00     Types: Cigarettes     Quit date: 2012     Years since quittin.1   • Smokeless tobacco: Never   • Tobacco comments:     QUIT 2012 WAS A SOCIAL SMOKER FOR 18 YEARS   Vaping Use   • Vaping Use: Never used   Substance and Sexual Activity   • Alcohol use: Yes     Comment: RARELY   • Drug use: Never   • Sexual activity: Defer        Home Medications:  albuterol sulfate HFA and losartan    Allergies:  No Known Allergies    Review of Systems   Constitutional: Negative for fatigue and fever.   HENT: Negative for sore throat and trouble swallowing.    Eyes: Negative for pain and discharge.   Respiratory: Positive for cough and shortness of breath.    Cardiovascular: Negative for chest pain and palpitations.   Gastrointestinal: Negative for abdominal pain, nausea and vomiting.   Endocrine: Negative for cold intolerance and heat intolerance.   Genitourinary: Negative for difficulty urinating and dysuria.   Musculoskeletal: Negative for back pain and neck stiffness.   Skin: Negative for color change and rash.   Neurological: Negative for syncope and  headaches.   Hematological: Negative for adenopathy.   Psychiatric/Behavioral: Negative for confusion and hallucinations.    Objective   Objective     Vitals:   Temp:  [98 °F (36.7 °C)-98.6 °F (37 °C)] 98.4 °F (36.9 °C)  Heart Rate:  [] 101  Resp:  [20-24] 20  BP: (122-181)/() 155/91  Flow (L/min):  [2] 2    Physical Exam   Gen. well-developed appearing stated age in no acute distress  HEENT: Normocephalic atraumatic moist membranes pupils equal round reactive light, no scleral icterus no conjunctival injection  Cardiovascular: regular tachycardic S1-S2, no lower extremity edema appreciated  Pulmonary: Clear to auscultation bilaterally no wheezes rales or rhonchi symmetric chest expansion, unlabored, no conversational dyspnea appreciated  Gastrointestinal: Soft nontender nondistended positive bowel sounds all 4 quadrants no rebound or guarding  Musculoskeletal: No clubbing cyanosis, warm and well-perfused, calves soft symmetric nontender bilaterally  Skin: Clean dry without rashes  Neuro: Cranial nerves II through XII intact grossly no sensorimotor deficits appreciated bilateral upper and lower extremities  Psych: Patient is calm cooperative and appropriate with exam not responding to internal stimuli  : No Shay catheter no bladder distention no suprapubic tenderness    Result Review    Result Review:  I have personally reviewed the results from the time of this admission to 2/28/2023 19:51 EST and agree with these findings:  [x]  Laboratory  LAB RESULTS:      Lab 02/28/23  1352 02/28/23  0832   WBC  --  10.21   HEMOGLOBIN  --  13.4   HEMATOCRIT  --  40.3   PLATELETS  --  268   NEUTROS ABS  --  7.34*   IMMATURE GRANS (ABS)  --  0.09*   LYMPHS ABS  --  1.42   MONOS ABS  --  0.52   EOS ABS  --  0.78*   MCV  --  83.1   PROCALCITONIN 0.06  --    LACTATE  --  1.6   D DIMER QUANT  --  0.42         Lab 02/28/23  0832   SODIUM 138   POTASSIUM 3.9   CHLORIDE 100   CO2 27.1   ANION GAP 10.9   BUN 11    CREATININE 0.63   EGFR 108.9   GLUCOSE 110*   CALCIUM 8.9   MAGNESIUM 1.9         Lab 02/28/23  0832   TOTAL PROTEIN 7.3   ALBUMIN 4.2   GLOBULIN 3.1   ALT (SGPT) 39*   AST (SGOT) 30   BILIRUBIN 1.0   ALK PHOS 69         Lab 02/28/23  1352 02/28/23  1135 02/28/23  0832   PROBNP  --   --  <36.0   HSTROP T 13* 17* 17*                 Lab 02/28/23  0855   PH, ARTERIAL 7.422   PCO2, ARTERIAL 39.3   PO2 ART 58.2*   O2 SATURATION ART 90.0*   FIO2 21   HCO3 ART 25.0   BASE EXCESS ART 0.7   CARBOXYHEMOGLOBIN 1.0     Brief Urine Lab Results  (Last result in the past 365 days)      Color   Clarity   Blood   Leuk Est   Nitrite   Protein   CREAT   Urine HCG        06/10/22 1256 Yellow   Clear   Negative   Large (3+)   Negative   Negative               Microbiology Results (last 10 days)     Procedure Component Value - Date/Time    Influenza Antigen, Rapid - Swab, Nasopharynx [[id number]]  (Normal) Collected: 02/28/23 0922    Lab Status: Final result Specimen: Swab from Nasopharynx Updated: 02/28/23 1013     Influenza A Ag, EIA Negative     Influenza B Ag, EIA Negative    COVID-19,APTIMA PANTHER(JERICA),[facility], NP/OP SWAB IN UTM/VTM/[facility]ALINE TRANSPORT MEDIA,24 HR TAT - Swab, Nasopharynx [[id number]]  (Normal) Collected: 02/28/23 0922    Lab Status: Final result Specimen: Swab from Nasopharynx Updated: 02/28/23 1426     COVID19 Not Detected    Narrative:      Fact sheet for providers: https://www.fda.gov/media/745529/download     Fact sheet for patients: https://www.fda.gov/media/711401/download    Test performed by RT PCR.    RSV Screen - Wash, Nasopharynx [[id number]]  (Normal) Collected: 02/28/23 0921    Lab Status: Final result Specimen: Wash from Nasopharynx Updated: 02/28/23 1013     RSV Rapid Ag Negative    Blood Culture - Blood, Arm, Left [[id number]]  (Normal) Collected: 02/19/23 1526    Lab Status: Final result Specimen: Blood from Arm, Left Updated: 02/24/23 1545     Blood Culture No growth at 5 days          [x]   Microbiology  [x]  Radiology  XR Chest 1 View    Result Date: 2/28/2023   No acute cardiopulmonary abnormality.       MANJULA ARORA MD       Electronically Signed and Approved By: MANJULA ARORA MD on 2/28/2023 at 8:16               [x]  EKG/Telemetry sinus tach 90s to 110s on telemetry review  []  Cardiology/Vascular   []  Pathology  []  Old records  [x]  Other:  Scheduled Meds:arformoterol, 15 mcg, Nebulization, BID - RT  budesonide, 0.25 mg, Nebulization, BID - RT  ipratropium-albuterol, 3 mL, Nebulization, Q4H While Awake - RT  [START ON 3/1/2023] losartan, 100 mg, Oral, Daily  predniSONE, 40 mg, Oral, Daily   Followed by  [START ON 3/7/2023] predniSONE, 30 mg, Oral, Daily   Followed by  [START ON 3/14/2023] predniSONE, 20 mg, Oral, Daily   Followed by  [START ON 3/21/2023] predniSONE, 10 mg, Oral, Daily With Breakfast  sodium chloride, 10 mL, Intravenous, Q12H      Continuous Infusions:   PRN Meds:.•  acetaminophen  •  albuterol  •  senna-docusate sodium **AND** polyethylene glycol **AND** bisacodyl **AND** bisacodyl  •  ondansetron **OR** ondansetron  •  sodium chloride  •  sodium chloride  •  sodium chloride  •  sodium chloride        Assessment & Plan   Assessment / Plan     Assessment/Plan:   Shortness of breath  Acute hypoxic respiratory failure  Suspected acute asthma exacerbation  Suspected obstructive sleep apnea  Obesity BMI 52  Hypertension        Patient admitted for further evaluation and treatment  Pulmonology critical care consulted thank for assistance  Continue supplemental nasal cannula oxygen titrate to keep sats greater 90%  Continue Brovana, Pulmicort and DuoNeb scheduled  Continue as needed albuterol  Continue prednisone taper per pulmonology  Get echocardiogram per pulmonology  Start CPAP at night and as needed  Bedside PFTs per pulmonology  She will need outpatient sleep study  Further inpatient orders recommendations pending clinical course      Discussed case with patient's nurse at  bedside.  Discussed case with emergency department attending.  Discussed case with pulmonology critical care attending.    Disposition: Home as respiratory function improves      DVT prophylaxis:  Mechanical DVT prophylaxis orders are present.    CODE STATUS:    Code Status (Patient has no pulse and is not breathing): CPR (Attempt to Resuscitate)  Medical Interventions (Patient has pulse or is breathing): Full Support      Admission Status:  I believe this patient meets inpatient status.

## 2023-03-01 NOTE — PLAN OF CARE
Goal Outcome Evaluation:  Plan of Care Reviewed With: patient        Progress: improving  Outcome Evaluation: Pt moved to room air around 1630, educated to replace nasal cannula should she become SOA or experience any dizziness, chest tightness, or tingling in her hands and feet, etc. Pt verbalized understanding. Pt sats in 94% on room air, no reports of SOA at this time. Pt denies any needs currently, call light in reach, in no apparent distress at this time.

## 2023-03-01 NOTE — PROGRESS NOTES
[facility]   Hospitalist Progress Note  Date: 3/1/2023  Patient Name: Mere Ivory  : 1975  MRN: [id number]  Date of admission: 2023      Subjective   Subjective     Chief Complaint: Follow-up shortness of breath    Summary:Mere Ivory is a 47 y.o. female past medical history of hypertension obesity BMI 52 presents with acute shortness of breath started on the morning of presentation.  Patient has been having intermittent shortness of breath over the past month has been treated with steroids and breathing treatments with improvement until patient completes course of steroids when shortness of breath recurs.  Patient endorses nonproductive cough, denies fever.  Patient denies recent travel.  Patient states that started as a postnasal drip and sinus congestion earlier in the month when she and her  both had head colds.  On the morning of presentation patient states she was unable to catch her breath or even take a couple steps due to shortness of breath.  Shortness of breath not improved with breathing treatments.  Patient presented to the emergency department for further evaluation and treatment.  On presentation patient found to be in respiratory distress with diffuse wheezing bilaterally.  Patient given Solu-Medrol and breathing treatment with some improvement.  ABG demonstrated hypoxia.  Patient started on 2 L nasal cannula to keep sats greater than 90%.  Chest x-ray negative for any acute infiltrate.  Flu RSV and COVID-negative.  D-dimer within normal limits.  Pro-Ramesh within normal limits.  Troponin mildly elevated and trending down.  Patient denies chest pain.  Suspect due to hypoxia. Pulmonology contacted by ED and agreed to consult.  Hospitalist service contacted for admission for further evaluation and treatment of suspected asthma with acute exacerbation and hypoxic respiratory failure.  Continue on inhaled bronchodilators and systemic steroids.  Respiratory  status slowly improving.  Slowly weaned to room air.  Patient amenable to renting CPAP until able to follow-up for outpatient sleep testing.    Interval Followup: Patient sitting up in bed resting comfortably.  Patient reports tolerating CPAP well overnight sleeping as well as she had in years.  Patient endorses feeling a little bit better.  Patient still endorses cough productive of brown sputum.  Patient denies any new issues.  Respiratory viral panel positive for rhinovirus.  No other issues per nursing.  Patient able to wean off supplemental oxygen this afternoon.  Respiratory Case management arranging for a rental CPAP unit.    Review of Systems  Constitutional: Negative for fatigue and fever.   HENT: Negative for sore throat and trouble swallowing.    Eyes: Negative for pain and discharge.   Respiratory: Positive for cough and shortness of breath.    Cardiovascular: Negative for chest pain and palpitations.   Gastrointestinal: Negative for abdominal pain, nausea and vomiting.   Endocrine: Negative for cold intolerance and heat intolerance.   Genitourinary: Negative for difficulty urinating and dysuria.   Musculoskeletal: Negative for back pain and neck stiffness.   Skin: Negative for color change and rash.   Neurological: Negative for syncope and headaches.   Hematological: Negative for adenopathy.   Psychiatric/Behavioral: Negative for confusion and hallucinations.    Objective   Objective     Vitals:   Temp:  [97.2 °F (36.2 °C)-98.4 °F (36.9 °C)] 98.2 °F (36.8 °C)  Heart Rate:  [] 77  Resp:  [10-20] 18  BP: (113-155)/(59-94) 130/94  Flow (L/min):  [1-2] 1  Physical Exam   Gen. well-developed appearing stated age, obese BMI 52, in no acute distress  HEENT: Normocephalic atraumatic moist membranes pupils equal round reactive light, no scleral icterus no conjunctival injection  Cardiovascular: regular rate and rhythm no murmurs rubs or gallops S1-S2, no lower extremity edema appreciated  Pulmonary: Clear  to auscultation bilaterally no wheezes rales or rhonchi symmetric chest expansion, unlabored, no conversational dyspnea appreciated  Gastrointestinal: Soft nontender nondistended positive bowel sounds all 4 quadrants no rebound or guarding  Musculoskeletal: No clubbing cyanosis, warm and well-perfused, calves soft symmetric nontender bilaterally  Skin: Clean dry without rashs  Neuro: Cranial nerves II through XII intact grossly no sensorimotor deficits appreciated bilateral upper and lower extremities  Psych: Patient is calm cooperative and appropriate with exam not responding to internal stimuli  : No Shay catheter no bladder distention no suprapubic tenderness    Result Review    Result Review:  I have personally reviewed these results and agree with these findings:  [x]  Laboratory  LAB RESULTS:      Lab 03/01/23  0501 02/28/23  1352 02/28/23  0832   WBC 15.79*  --  10.21   HEMOGLOBIN 12.9  --  13.4   HEMATOCRIT 37.7  --  40.3   PLATELETS 275  --  268   NEUTROS ABS 13.56*  --  7.34*   IMMATURE GRANS (ABS) 0.25*  --  0.09*   LYMPHS ABS 1.31  --  1.42   MONOS ABS 0.62  --  0.52   EOS ABS 0.02  --  0.78*   MCV 82.7  --  83.1   PROCALCITONIN  --  0.06  --    LACTATE  --   --  1.6   D DIMER QUANT  --   --  0.42         Lab 02/28/23  0832   SODIUM 138   POTASSIUM 3.9   CHLORIDE 100   CO2 27.1   ANION GAP 10.9   BUN 11   CREATININE 0.63   EGFR 108.9   GLUCOSE 110*   CALCIUM 8.9   MAGNESIUM 1.9         Lab 02/28/23  0832   TOTAL PROTEIN 7.3   ALBUMIN 4.2   GLOBULIN 3.1   ALT (SGPT) 39*   AST (SGOT) 30   BILIRUBIN 1.0   ALK PHOS 69         Lab 02/28/23  1352 02/28/23  1135 02/28/23  0832   PROBNP  --   --  <36.0   HSTROP T 13* 17* 17*                 Lab 02/28/23  0855   PH, ARTERIAL 7.422   PCO2, ARTERIAL 39.3   PO2 ART 58.2*   O2 SATURATION ART 90.0*   FIO2 21   HCO3 ART 25.0   BASE EXCESS ART 0.7   CARBOXYHEMOGLOBIN 1.0     Brief Urine Lab Results  (Last result in the past 365 days)      Color   Clarity   Blood    Leuk Est   Nitrite   Protein   CREAT   Urine HCG        06/10/22 1256 Yellow   Clear   Negative   Large (3+)   Negative   Negative               Microbiology Results (last 10 days)     Procedure Component Value - Date/Time    Respiratory Panel PCR w/COVID-19(SARS-CoV-2) ROLANDO/GRACIELA/MAKAYLA/PAD/COR/MAD/ERNESTINA In-House, NP Swab in UTM/VTM, 3-4 HR TAT - Swab, Nasopharynx [[id number]]  (Abnormal) Collected: 02/28/23 1852    Lab Status: Final result Specimen: Swab from Nasopharynx Updated: 02/28/23 2221     ADENOVIRUS, PCR Not Detected     Coronavirus 229E Not Detected     Coronavirus HKU1 Not Detected     Coronavirus NL63 Not Detected     Coronavirus OC43 Not Detected     COVID19 Not Detected     Human Metapneumovirus Not Detected     Human Rhinovirus/Enterovirus Detected     Influenza A PCR Not Detected     Influenza B PCR Not Detected     Parainfluenza Virus 1 Not Detected     Parainfluenza Virus 2 Not Detected     Parainfluenza Virus 3 Not Detected     Parainfluenza Virus 4 Not Detected     RSV, PCR Not Detected     Bordetella pertussis pcr Not Detected     Bordetella parapertussis PCR Not Detected     Chlamydophila pneumoniae PCR Not Detected     Mycoplasma pneumo by PCR Not Detected    Narrative:      In the setting of a positive respiratory panel with a viral infection PLUS a negative procalcitonin without other underlying concern for bacterial infection, consider observing off antibiotics or discontinuation of antibiotics and continue supportive care. If the respiratory panel is positive for atypical bacterial infection (Bordetella pertussis, Chlamydophila pneumoniae, or Mycoplasma pneumoniae), consider antibiotic de-escalation to target atypical bacterial infection.    Influenza Antigen, Rapid - Swab, Nasopharynx [[id number]]  (Normal) Collected: 02/28/23 0922    Lab Status: Final result Specimen: Swab from Nasopharynx Updated: 02/28/23 1013     Influenza A Ag, EIA Negative     Influenza B Ag, EIA Negative    COVID-19,APTIMA  DEB(JERICA), ROLANDO/ SHMUEL, NP/OP SWAB IN UTM/VTM/SALINE TRANSPORT MEDIA,24 HR TAT - Swab, Nasopharynx [[id number]]  (Normal) Collected: 02/28/23 0922    Lab Status: Final result Specimen: Swab from Nasopharynx Updated: 02/28/23 1426     COVID19 Not Detected    Narrative:      Fact sheet for providers: https://www.fda.gov/media/671276/download     Fact sheet for patients: https://www.fda.gov/media/483102/download    Test performed by RT PCR.    RSV Screen - Wash, Nasopharynx [[id number]]  (Normal) Collected: 02/28/23 0921    Lab Status: Final result Specimen: Wash from Nasopharynx Updated: 02/28/23 1013     RSV Rapid Ag Negative    Blood Culture - Blood, Arm, Left [[id number]]  (Normal) Collected: 02/28/23 0832    Lab Status: Preliminary result Specimen: Blood from Arm, Left Updated: 03/01/23 0901     Blood Culture No growth at 24 hours    Blood Culture - Blood, Arm, Right [[id number]]  (Normal) Collected: 02/28/23 0832    Lab Status: Preliminary result Specimen: Blood from Arm, Right Updated: 03/01/23 0901     Blood Culture No growth at 24 hours          [x]  Microbiology  [x]  Radiology  XR Chest 1 View    Result Date: 2/28/2023   No acute cardiopulmonary abnormality.       MANJULA ARORA MD       Electronically Signed and Approved By: MANJULA ARORA MD on 2/28/2023 at 8:16               []  EKG/Telemetry   [x]  Cardiology/Vascular   Echocardiogram 2/28/2023  Normal left ventricular systolic function.  No significant valve abnormalities noted.    []  Pathology  []  Old records  [x]  Other:  Scheduled Meds:arformoterol, 15 mcg, Nebulization, BID - RT  budesonide, 0.25 mg, Nebulization, BID - RT  losartan, 100 mg, Oral, Daily  predniSONE, 40 mg, Oral, Daily   Followed by  [START ON 3/7/2023] predniSONE, 30 mg, Oral, Daily   Followed by  [START ON 3/14/2023] predniSONE, 20 mg, Oral, Daily   Followed by  [START ON 3/21/2023] predniSONE, 10 mg, Oral, Daily With Breakfast  sodium chloride, 10 mL, Intravenous,  Q12H      Continuous Infusions:   PRN Meds:.•  acetaminophen  •  albuterol  •  senna-docusate sodium **AND** polyethylene glycol **AND** bisacodyl **AND** bisacodyl  •  ipratropium-albuterol  •  ondansetron **OR** ondansetron  •  sodium chloride  •  sodium chloride  •  sodium chloride  •  sodium chloride      Assessment & Plan   Assessment / Plan     Assessment/Plan:  Shortness of breath  Rhinovirus infection  Acute hypoxic respiratory failure  Suspected acute asthma exacerbation  Suspected obstructive sleep apnea  Obesity BMI 52  Hypertension           Patient admitted for further evaluation and treatment  Pulmonology critical care consulted thank for assistance  Continue supplemental nasal cannula oxygen titrate to keep sats greater 90%  Continue Brovana, Pulmicort and DuoNeb scheduled  Continue as needed albuterol  Continue prednisone taper per pulmonology  Echocardiogram reviewed and within normal limits  Continue CPAP at night and as needed  Bedside PFTs per pulmonology  She will need outpatient sleep study  Further inpatient orders recommendations pending clinical course        Discussed case with patient's nurse at bedside.  Discussed case with pulmonology critical care attending.     Disposition: Home once CPAP set up for home.       DVT prophylaxis:  Mechanical DVT prophylaxis orders are present.    CODE STATUS:   Code Status (Patient has no pulse and is not breathing): CPR (Attempt to Resuscitate)  Medical Interventions (Patient has pulse or is breathing): Full Support

## 2023-03-01 NOTE — CASE MANAGEMENT/SOCIAL WORK
RT CM consulted to arrange home CPAP rental for patient with suspected BRANDT while she is awaiting sleep study.    [facility], will follow up with patient regarding details on discharge.

## 2023-03-01 NOTE — PLAN OF CARE
Goal Outcome Evaluation:   Patient wore Bipap for the first time tonight. She stayed on the unit throughout the night and tolerated it well.

## 2023-03-01 NOTE — PROGRESS NOTES
Pulmonary / Critical Care Progress Note      Patient Name: Mere Ivory  : 1975  MRN: [id number]  Attending:  Yosi Velez MD  Date of admission: 2023    Subjective   Subjective   Follow-up for shortness of breath, hypoxic respiratory failure    Over past 24 hours:  Doing well this morning  Currently on 1 L nasal cannula, SPO2 greater than 93%  Feeling better overall  Rhinovirus positive  Tolerated CPAP overnight and would like to continue at home    Review of Systems  General: Denied complaints  Cardiovascular:  Denied complaints  Respiratory: Denied complaints  Gastrointestinal: Denied complaints        Objective   Objective     Vitals:   Temp:  [97.2 °F (36.2 °C)-98.6 °F (37 °C)] 98.2 °F (36.8 °C)  Heart Rate:  [] 78  Resp:  [10-22] 18  BP: (113-155)/(59-99) 132/78  Flow (L/min):  [2] 2    Physical Exam   Vital Signs Reviewed   General:  WDWN, Alert, NAD.    HEENT:  PERRL, EOMI.  OP, nares clear  Chest:  good aeration, clear to auscultation bilaterally, no work of breathing noted on 1L  CV: RRR, no MGR, pulses 2+, equal.  Abd:  Soft, NT, ND, + BS, OBESE  EXT:  no clubbing, no cyanosis, no edema  Neuro:  A&Ox3, CN grossly intact, no focal deficits.  Skin: No rashes or lesions noted      Result Review    Result Review:  I have personally reviewed the results from the time of this admission to 3/1/2023 12:37 EST and agree with these findings:  [x]  Laboratory  [x]  Microbiology  [x]  Radiology  []  EKG/Telemetry   []  Cardiology/Vascular   []  Pathology  []  Old records  []  Other:  Most notable findings include:   - wbc 15  -2D echo showed EF 65%     Assessment & Plan   Assessment / Plan     Active Hospital Problems:  Active Hospital Problems    Diagnosis    • **Acute respiratory failure with hypoxemia (HCC)        Impression:  Shortness of breath  Acute hypoxic respiratory failure requiring oxygen  Human rhinovirus+  Presumed asthma with exacerbation  Likely BRANDT/OHS  Elevated  eosinophil  Obesity, BMI 52.6    Plan:  -Currently on 1 L nasal cannula, continue to wean to keep SPO2 greater than 90%  -Human rhinovirus positive  -Chest CT largely unremarkable.  CTPE negative on admission  -Allergen mini panel negative  -2D echo showed EF 65%  -Continue prednisone taper until follow-up with me in clinic  -Continue CPAP nightly; will see if patient can vent the machine on discharge; will need formal sleep study outpatient  -We will need formal PFTs outpatient    DVT prophylaxis:  Mechanical DVT prophylaxis orders are present.    CODE STATUS:   Code Status (Patient has no pulse and is not breathing): CPR (Attempt to Resuscitate)  Medical Interventions (Patient has pulse or is breathing): Full Support    Labs, images, and medications personally reviewed.    Discussed with patient    Electronically signed by Hi Lorenz MD, 03/01/23, 12:37 PM EST.

## 2023-03-01 NOTE — PLAN OF CARE
Goal Outcome Evaluation:   Pt had new orders for Home CPAP or in house CPAP (12) at the beginning of shift. Pt did not wear the CPAP last night because she was asleep and resting well on 2L with SATs in the high 90's. Pt did state this morning when she was awake, that she does not wear a home CPAP unit.

## 2023-03-01 NOTE — PLAN OF CARE
Goal Outcome Evaluation:  Plan of Care Reviewed With: patient        Progress: improving  Outcome Evaluation: Patient was evaluated today. She was found to be on BIPAP 20/8cmH20 28% O2.  This was patient's first experience on unit. She loved it. However, upon looking at the order, it was for Auto CPAP 8-20. Our V60 units do not do that. I contacted Dr. Lorenz this morning and we discussed titrating her on unit. We left the V60 CPAP on [id number] for now. Pt understands. Order updated to match settings.

## 2023-03-02 VITALS
HEART RATE: 74 BPM | BODY MASS INDEX: 44.41 KG/M2 | RESPIRATION RATE: 20 BRPM | DIASTOLIC BLOOD PRESSURE: 68 MMHG | WEIGHT: 293 LBS | TEMPERATURE: 97.8 F | OXYGEN SATURATION: 95 % | HEIGHT: 68 IN | SYSTOLIC BLOOD PRESSURE: 114 MMHG

## 2023-03-02 LAB
BASOPHILS # BLD AUTO: 0.06 10*3/MM3 (ref 0–0.2)
BASOPHILS NFR BLD AUTO: 0.4 % (ref 0–1.5)
DEPRECATED RDW RBC AUTO: 43.6 FL (ref 37–54)
DSDNA IGG SERPL IA-ACNC: NEGATIVE [IU]/ML
EOSINOPHIL # BLD AUTO: 0.75 10*3/MM3 (ref 0–0.4)
EOSINOPHIL NFR BLD AUTO: 4.9 % (ref 0.3–6.2)
ERYTHROCYTE [DISTWIDTH] IN BLOOD BY AUTOMATED COUNT: 14.2 % (ref 12.3–15.4)
HCT VFR BLD AUTO: 39.2 % (ref 34–46.6)
HGB BLD-MCNC: 12.9 G/DL (ref 12–15.9)
IMM GRANULOCYTES # BLD AUTO: 0.16 10*3/MM3 (ref 0–0.05)
IMM GRANULOCYTES NFR BLD AUTO: 1 % (ref 0–0.5)
LYMPHOCYTES # BLD AUTO: 3.93 10*3/MM3 (ref 0.7–3.1)
LYMPHOCYTES NFR BLD AUTO: 25.5 % (ref 19.6–45.3)
MCH RBC QN AUTO: 28.1 PG (ref 26.6–33)
MCHC RBC AUTO-ENTMCNC: 32.9 G/DL (ref 31.5–35.7)
MCV RBC AUTO: 85.4 FL (ref 79–97)
MONOCYTES # BLD AUTO: 0.89 10*3/MM3 (ref 0.1–0.9)
MONOCYTES NFR BLD AUTO: 5.8 % (ref 5–12)
NEUTROPHILS NFR BLD AUTO: 62.4 % (ref 42.7–76)
NEUTROPHILS NFR BLD AUTO: 9.63 10*3/MM3 (ref 1.7–7)
NRBC BLD AUTO-RTO: 0 /100 WBC (ref 0–0.2)
NUCLEAR IGG SER IA-RTO: NEGATIVE
PLATELET # BLD AUTO: 268 10*3/MM3 (ref 140–450)
PMV BLD AUTO: 10 FL (ref 6–12)
RBC # BLD AUTO: 4.59 10*6/MM3 (ref 3.77–5.28)
WBC NRBC COR # BLD: 15.42 10*3/MM3 (ref 3.4–10.8)

## 2023-03-02 PROCEDURE — 85025 COMPLETE CBC W/AUTO DIFF WBC: CPT | Performed by: FAMILY MEDICINE

## 2023-03-02 PROCEDURE — 94799 UNLISTED PULMONARY SVC/PX: CPT

## 2023-03-02 PROCEDURE — 99239 HOSP IP/OBS DSCHRG MGMT >30: CPT | Performed by: FAMILY MEDICINE

## 2023-03-02 PROCEDURE — 63710000001 PREDNISONE PER 1 MG: Performed by: NURSE PRACTITIONER

## 2023-03-02 PROCEDURE — 99233 SBSQ HOSP IP/OBS HIGH 50: CPT | Performed by: STUDENT IN AN ORGANIZED HEALTH CARE EDUCATION/TRAINING PROGRAM

## 2023-03-02 RX ORDER — PREDNISONE 10 MG/1
TABLET ORAL
Qty: 58 TABLET | Refills: 0 | Status: SHIPPED | OUTPATIENT
Start: 2023-03-02 | End: 2023-03-27

## 2023-03-02 RX ORDER — ALBUTEROL SULFATE 90 UG/1
2 AEROSOL, METERED RESPIRATORY (INHALATION) EVERY 4 HOURS PRN
Qty: 8.5 G | Refills: 11 | Status: SHIPPED | OUTPATIENT
Start: 2023-03-02 | End: 2023-03-20 | Stop reason: SDUPTHER

## 2023-03-02 RX ORDER — FLUTICASONE PROPIONATE AND SALMETEROL XINAFOATE 115; 21 UG/1; UG/1
2 AEROSOL, METERED RESPIRATORY (INHALATION)
Qty: 12 G | Refills: 0 | Status: SHIPPED | OUTPATIENT
Start: 2023-03-02 | End: 2023-03-20

## 2023-03-02 RX ORDER — IPRATROPIUM BROMIDE AND ALBUTEROL SULFATE 2.5; .5 MG/3ML; MG/3ML
3 SOLUTION RESPIRATORY (INHALATION) EVERY 4 HOURS PRN
Qty: 360 ML | Refills: 0 | Status: SHIPPED | OUTPATIENT
Start: 2023-03-02 | End: 2023-03-20 | Stop reason: SDUPTHER

## 2023-03-02 RX ADMIN — BUDESONIDE 0.25 MG: 0.25 SUSPENSION RESPIRATORY (INHALATION) at 07:23

## 2023-03-02 RX ADMIN — ARFORMOTEROL TARTRATE 15 MCG: 15 SOLUTION RESPIRATORY (INHALATION) at 07:23

## 2023-03-02 RX ADMIN — PREDNISONE 40 MG: 20 TABLET ORAL at 10:11

## 2023-03-02 RX ADMIN — LOSARTAN POTASSIUM 100 MG: 25 TABLET, FILM COATED ORAL at 10:11

## 2023-03-02 RX ADMIN — Medication 10 ML: at 10:13

## 2023-03-02 NOTE — PLAN OF CARE
Goal Outcome Evaluation:Pt. Wearing CPAP unit at night. Settings are CPAP of 18. Pt. Stated the settings felt comfortable. Pt. Tolerating and resting well.

## 2023-03-02 NOTE — PLAN OF CARE
Goal Outcome Evaluation:  Plan of Care Reviewed With: patient        Progress: improving  Outcome Evaluation: Patient on room air, saturations mid 90s at rest. Wore cpap overnight. No issues reported or observed

## 2023-03-02 NOTE — DISCHARGE SUMMARY
[facility]         HOSPITALIST  DISCHARGE SUMMARY    Patient Name: Mere Ivory  : 1975  MRN: [id number]    Date of Admission: 2023  Date of Discharge: 3/2/2023  Primary Care Physician: Shubham Alejandro APRN    Consults     Date and Time Order Name Status Description    2023 11:01 AM Hospitalist (on-call MD unless specified)      2023 10:48 AM Pulmonology (on-call MD unless specified) Completed           Active and Resolved Hospital Problems:  Shortness of breath  Rhinovirus infection  Acute hypoxic respiratory failure  Suspected acute asthma exacerbation  Suspected obstructive sleep apnea  Obesity BMI 52  Hypertension  Active Hospital Problems    Diagnosis POA   • **Acute respiratory failure with hypoxemia (HCC) [J96.01] Yes      Resolved Hospital Problems   No resolved problems to display.       Hospital Course     Hospital Course:  Mere Ivory is a 47 y.o. female past medical history of hypertension obesity BMI 52 presents with acute shortness of breath started on the morning of presentation.  Patient has been having intermittent shortness of breath over the past month has been treated with steroids and breathing treatments with improvement until patient completes course of steroids when shortness of breath recurs.  Patient endorses nonproductive cough, denies fever.  Patient denies recent travel.  Patient states that started as a postnasal drip and sinus congestion earlier in the month when she and her  both had head colds.  On the morning of presentation patient states she was unable to catch her breath or even take a couple steps due to shortness of breath.  Shortness of breath not improved with breathing treatments.  Patient presented to the emergency department for further evaluation and treatment.  On presentation patient found to be in respiratory distress with diffuse wheezing bilaterally.  Patient given Solu-Medrol and breathing  treatment with some improvement.  ABG demonstrated hypoxia.  Patient started on 2 L nasal cannula to keep sats greater than 90%.  Chest x-ray negative for any acute infiltrate.  Flu RSV and COVID-negative.  D-dimer within normal limits.  Pro-Ramesh within normal limits.  Troponin mildly elevated and trending down.  Patient denies chest pain.  Suspect due to hypoxia. Pulmonology contacted by ED and agreed to consult.  Hospitalist service contacted for admission for further evaluation and treatment of suspected asthma with acute exacerbation and hypoxic respiratory failure.  Continue on inhaled bronchodilators and systemic steroids.  Respiratory status slowly improving.  Slowly weaned to room air.  Patient amenable to renting CPAP until able to follow-up for outpatient sleep testing.  Patient able to be weaned to room air.  Patient seen evaluated on day discharge and thus stable for discharge home on Advair as well as as needed albuterol and prednisone taper to follow-up with her primary care provider and pulmonology.        DISCHARGE Follow Up Recommendations for labs and diagnostics: As above      Day of Discharge     Vital Signs:  Temp:  [97.8 °F (36.6 °C)-98.2 °F (36.8 °C)] 97.8 °F (36.6 °C)  Heart Rate:  [73-86] 74  Resp:  [18-20] 20  BP: (108-134)/(64-94) 114/68  Flow (L/min):  [0-1] 0  Physical Exam:   Gen. well-developed appearing stated age, obese BMI 52, in no acute distress  HEENT: Normocephalic atraumatic moist membranes pupils equal round reactive light, no scleral icterus no conjunctival injection  Cardiovascular: regular rate and rhythm no murmurs rubs or gallops S1-S2, no lower extremity edema appreciated  Pulmonary: Clear to auscultation bilaterally no wheezes rales or rhonchi symmetric chest expansion, unlabored, no conversational dyspnea appreciated  Gastrointestinal: Soft nontender nondistended positive bowel sounds all 4 quadrants no rebound or guarding  Musculoskeletal: No clubbing cyanosis, warm  and well-perfused, calves soft symmetric nontender bilaterally  Skin: Clean dry without rashs  Neuro: Cranial nerves II through XII intact grossly no sensorimotor deficits appreciated bilateral upper and lower extremities  Psych: Patient is calm cooperative and appropriate with exam not responding to internal stimuli  : No Shay catheter no bladder distention no suprapubic tenderness         Discharge Details        Discharge Medications      New Medications      Instructions Start Date   Advair -21 MCG/ACT inhaler  Generic drug: fluticasone-salmeterol   2 puffs, Inhalation, 2 Times Daily - RT      ipratropium-albuterol 0.5-2.5 mg/3 ml nebulizer  Commonly known as: DUO-NEB   3 mL, Nebulization, Every 4 Hours PRN      predniSONE 10 MG tablet  Commonly known as: DELTASONE   Take 4 tablets by mouth Daily for 4 days, THEN 3 tablets Daily for 7 days, THEN 2 tablets Daily for 7 days, THEN 1 tablet Daily for 7 days.   Start Date: March 2, 2023        Continue These Medications      Instructions Start Date   albuterol sulfate  (90 Base) MCG/ACT inhaler  Commonly known as: PROVENTIL HFA;VENTOLIN HFA;PROAIR HFA   2 puffs, Inhalation, Every 4 Hours PRN      losartan 100 MG tablet  Commonly known as: COZAAR   TAKE 1 TABLET BY MOUTH EVERY DAY             No Known Allergies    Discharge Disposition:  Home or Self Care    Diet:  Hospital:  Diet Order   Procedures   • Diet: Regular/House Diet; Texture: Regular Texture (IDDSI 7); Fluid Consistency: Thin (IDDSI 0)       Discharge Activity:   Activity Instructions     Gradually Increase Activity Until at Pre-Hospitalization Level            CODE STATUS:  Code Status and Medical Interventions:   Ordered at: 02/28/23 1951     Code Status (Patient has no pulse and is not breathing):    CPR (Attempt to Resuscitate)     Medical Interventions (Patient has pulse or is breathing):    Full Support         Future Appointments   Date Time Provider Department Center   3/20/2023   9:30 AM Hi Lorenz MD [facility] PCC ETW [facility]   4/17/2023 11:15 AM Steff Bailey, AVI, [facility] SLEEP CT [facility]   4/18/2023  7:15 AM Vicenta Caballero, [facility] BAR SRG None   8/1/2023  2:15 PM Nancy Devries, [facility] GS HARET [facility]   1/29/2024  1:30 PM Leroy Burns, [facility] OBG WTPT [facility]       Additional Instructions for the Follow-ups that You Need to Schedule     Ambulatory Referral to Sleep Medicine   As directed      Follow-up needed: Yes         Discharge Follow-up with PCP   As directed       Currently Documented PCP:    Shubham Alejandro APRN    PCP Phone Number:    [phone]     Follow Up Details: Hospital discharge follow-up acute proximal respiratory failure         Discharge Follow-up with Specified Provider: Dr. Lorenz pulmonology; 1 Week   As directed      To: Dr. Lorenz pulmonology    Follow Up: 1 Week    Follow Up Details: Hospital discharge follow-up acute [place] respiratory failure concern for asthma, BRANDT/OHS               Pertinent  and/or Most Recent Results     PROCEDURES:   None    LAB RESULTS:      Lab 03/02/23  0522 03/01/23  0501 02/28/23  1352 02/28/23  0832   WBC 15.42* 15.79*  --  10.21   HEMOGLOBIN 12.9 12.9  --  13.4   HEMATOCRIT 39.2 37.7  --  40.3   PLATELETS 268 275  --  268   NEUTROS ABS 9.63* 13.56*  --  7.34*   IMMATURE GRANS (ABS) 0.16* 0.25*  --  0.09*   LYMPHS ABS 3.93* 1.31  --  1.42   MONOS ABS 0.89 0.62  --  0.52   EOS ABS 0.75* 0.02  --  0.78*   MCV 85.4 82.7  --  83.1   PROCALCITONIN  --   --  0.06  --    LACTATE  --   --   --  1.6   D DIMER QUANT  --   --   --  0.42         Lab 02/28/23  0832   SODIUM 138   POTASSIUM 3.9   CHLORIDE 100   CO2 27.1   ANION GAP 10.9   BUN 11   CREATININE 0.63   EGFR 108.9   GLUCOSE 110*   CALCIUM 8.9   MAGNESIUM 1.9         Lab 02/28/23  0832   TOTAL PROTEIN 7.3   ALBUMIN 4.2   GLOBULIN 3.1   ALT (SGPT) 39*   AST (SGOT) 30   BILIRUBIN 1.0   ALK PHOS 69         Lab 02/28/23  1352 02/28/23  1135 02/28/23  0832   PROBNP  --   --  <36.0   HSTROP T  13* 17* 17*                 Lab 02/28/23  0855   PH, ARTERIAL 7.422   PCO2, ARTERIAL 39.3   PO2 ART 58.2*   O2 SATURATION ART 90.0*   FIO2 21   HCO3 ART 25.0   BASE EXCESS ART 0.7   CARBOXYHEMOGLOBIN 1.0     Brief Urine Lab Results  (Last result in the past 365 days)      Color   Clarity   Blood   Leuk Est   Nitrite   Protein   CREAT   Urine HCG        06/10/22 1256 Yellow   Clear   Negative   Large (3+)   Negative   Negative               Microbiology Results (last 10 days)     Procedure Component Value - Date/Time    Respiratory Panel PCR w/COVID-19(SARS-CoV-2) ROLANDO/GRACIELA/MAKAYLA/PAD/COR/MAD/ERNESTINA In-House, NP Swab in UTM/VTM, 3-4 HR TAT - Swab, Nasopharynx [[id number]]  (Abnormal) Collected: 02/28/23 1852    Lab Status: Final result Specimen: Swab from Nasopharynx Updated: 02/28/23 2221     ADENOVIRUS, PCR Not Detected     Coronavirus 229E Not Detected     Coronavirus HKU1 Not Detected     Coronavirus NL63 Not Detected     Coronavirus OC43 Not Detected     COVID19 Not Detected     Human Metapneumovirus Not Detected     Human Rhinovirus/Enterovirus Detected     Influenza A PCR Not Detected     Influenza B PCR Not Detected     Parainfluenza Virus 1 Not Detected     Parainfluenza Virus 2 Not Detected     Parainfluenza Virus 3 Not Detected     Parainfluenza Virus 4 Not Detected     RSV, PCR Not Detected     Bordetella pertussis pcr Not Detected     Bordetella parapertussis PCR Not Detected     Chlamydophila pneumoniae PCR Not Detected     Mycoplasma pneumo by PCR Not Detected    Narrative:      In the setting of a positive respiratory panel with a viral infection PLUS a negative procalcitonin without other underlying concern for bacterial infection, consider observing off antibiotics or discontinuation of antibiotics and continue supportive care. If the respiratory panel is positive for atypical bacterial infection (Bordetella pertussis, Chlamydophila pneumoniae, or Mycoplasma pneumoniae), consider antibiotic de-escalation to  target atypical bacterial infection.    Influenza Antigen, Rapid - Swab, Nasopharynx [[id number]]  (Normal) Collected: 02/28/23 0922    Lab Status: Final result Specimen: Swab from Nasopharynx Updated: 02/28/23 1013     Influenza A Ag, EIA Negative     Influenza B Ag, EIA Negative    COVID-19,APTIMA PANTHER(JERICA),[facility], NP/OP SWAB IN UTM/VTM/[facility]ALINE TRANSPORT MEDIA,24 HR TAT - Swab, Nasopharynx [[id number]]  (Normal) Collected: 02/28/23 0922    Lab Status: Final result Specimen: Swab from Nasopharynx Updated: 02/28/23 1426     COVID19 Not Detected    Narrative:      Fact sheet for providers: https://www.fda.gov/media/067312/download     Fact sheet for patients: https://www.fda.gov/media/009549/download    Test performed by RT PCR.    RSV Screen - Wash, Nasopharynx [[id number]]  (Normal) Collected: 02/28/23 0921    Lab Status: Final result Specimen: Wash from Nasopharynx Updated: 02/28/23 1013     RSV Rapid Ag Negative    Blood Culture - Blood, Arm, Left [[id number]]  (Normal) Collected: 02/28/23 0832    Lab Status: Preliminary result Specimen: Blood from Arm, Left Updated: 03/02/23 0900     Blood Culture No growth at 2 days    Blood Culture - Blood, Arm, Right [[id number]]  (Normal) Collected: 02/28/23 0832    Lab Status: Preliminary result Specimen: Blood from Arm, Right Updated: 03/02/23 0900     Blood Culture No growth at 2 days          CT Chest With Contrast Diagnostic    Result Date: 2/9/2023  Impression:   1. Negative for pulmonary embolus. 2. No acute cardiopulmonary process. 3. Mild mediastinal lymphadenopathy, nonspecific.     LUCINA LOVE MD       Electronically Signed and Approved By: LUCINA LOVE MD on 2/09/2023 at 6:47             XR Chest 1 View    Result Date: 2/28/2023  Impression:  No acute cardiopulmonary abnormality.       MANJULA ARORA MD       Electronically Signed and Approved By: MANJULA ARORA MD on 2/28/2023 at 8:16             XR Chest 1 View    Result Date:  2/19/2023  Impression:  No active disease is seen.       MOE HAWKINS MD       Electronically Signed and Approved By: MOE HAWKINS MD on 2/19/2023 at 15:48             XR Chest 1 View    Result Date: 2/9/2023  Impression:  No acute cardiopulmonary process identified.       LUCINA LOVE MD       Electronically Signed and Approved By: LUCINA LOVE MD on 2/09/2023 at 4:25             Mammo Screening Digital Tomosynthesis Bilateral With CAD    Result Date: 1/31/2023  Impression:  Benign mammogram. Suggest routine mammographic screening.  RECOMMENDATION(S):  ROUTINE MAMMOGRAM AND CLINICAL EVALUATION IN 12 MONTHS.   BIRADS:  DIAGNOSTIC CATEGORY 1--NEGATIVE.   BREAST COMPOSITION: Almost entirely fatty.  PLEASE NOTE:  A NORMAL MAMMOGRAM DOES NOT EXCLUDE THE POSSIBILITY OF BREAST CANCER. ANY CLINICALLY SUSPICIOUS PALPABLE LUMP SHOULD BE BIOPSIED.      LUCINA LOVE MD       Electronically Signed and Approved By: LUCINA LOVE MD on 1/31/2023 at 15:28                       Results for orders placed during the hospital encounter of 02/28/23    Adult Transthoracic Echo Limited W/ Cont if Necessary Per Protocol    Interpretation Summary  Normal left ventricular systolic function.  No significant valve abnormalities noted.      Labs Pending at Discharge:  Pending Labs     Order Current Status    BENJAMIN In process    Allergen Profile, Mini - Panel In process    Blood Culture - Blood, Arm, Left Preliminary result    Blood Culture - Blood, Arm, Right Preliminary result            Time spent on Discharge including face to face service: Greater than 35 minutes    Electronically signed by Yosi Velez MD, 03/02/23, 10:55 AM EST.

## 2023-03-02 NOTE — PROGRESS NOTES
Pulmonary / Critical Care Progress Note      Patient Name: Mere Ivory  : 1975  MRN: [id number]  Attending:  Yosi Velez MD  Date of admission: 2023    Subjective   Subjective   Follow-up for shortness of breath, hypoxic respiratory failure    Over past 24 hours:  Doing well this morning  Sitting up in chair  Currently on room air  Overall feeling better  Tolerated CPAP at night    Review of Systems  General: Denied complaints  Cardiovascular:  Denied complaints  Respiratory: Denied complaints  Gastrointestinal: Denied complaints        Objective   Objective     Vitals:   Temp:  [97.8 °F (36.6 °C)-98.2 °F (36.8 °C)] 97.8 °F (36.6 °C)  Heart Rate:  [73-86] 74  Resp:  [18-20] 20  BP: (108-134)/(64-94) 114/68  Flow (L/min):  [0-1] 0    Physical Exam   Vital Signs Reviewed   General:  WDWN, Alert, NAD.    HEENT:  PERRL, EOMI.  OP, nares clear  Chest:  good aeration, clear to auscultation bilaterally, no work of breathing noted on room air  CV: RRR, no MGR, pulses 2+, equal.  Abd:  Soft, NT, ND, + BS, Obese  EXT:  no clubbing, no cyanosis, no edema  Neuro:  A&Ox3, CN grossly intact, no focal deficits.  Skin: No rashes or lesions noted      Result Review    Result Review:  I have personally reviewed the results from the time of this admission to 3/2/2023 11:42 EST and agree with these findings:  [x]  Laboratory  [x]  Microbiology  [x]  Radiology  []  EKG/Telemetry   []  Cardiology/Vascular   []  Pathology  []  Old records  []  Other:  Most notable findings include:   -2D echo showed EF 65%     Assessment & Plan   Assessment / Plan     Active Hospital Problems:  Active Hospital Problems    Diagnosis    • **Acute respiratory failure with hypoxemia (HCC)        Impression:  Shortness of breath  Acute hypoxic respiratory failure requiring oxygen, resolved  Human rhinovirus+  Presumed asthma with exacerbation  Likely BRANDT/OHS  Elevated eosinophil  Obesity, BMI 52.6    Plan:  -Continue to wean oxygen  to keep SPO2 greater than 90%  -Human rhinovirus positive  -Chest CT largely unremarkable.  CTPE negative on admission  -Allergen mini panel negative  -2D echo showed EF 65%  -Continue 4-week prednisone taper until follow-up with me in clinic  -Continue CPAP nightly; patient can rent CPAP machine on discharge until formal sleep study outpatient  -Will need formal PFTs outpatient as well  -On discharge please send patient home on ICS/LABA (ie symbicort) BID and albuterol PRN  -Follow up with pulmonary within 2 weeks     DVT prophylaxis:  Mechanical DVT prophylaxis orders are present.    CODE STATUS:   Code Status (Patient has no pulse and is not breathing): CPR (Attempt to Resuscitate)  Medical Interventions (Patient has pulse or is breathing): Full Support    Labs, images, and medications personally reviewed.    Discussed with patient    Electronically signed by Hi Lorenz MD, 03/01/23, 12:37 PM EST.

## 2023-03-02 NOTE — NURSING NOTE
Pt given discharge instructions on CPAP use at home, and to make sure if she becomes SOA while doing activities; to sit down and rest. Pt was able to walk on RA around the room and remain at 95% with mild SOA, sat down and recovered quickly. Pt had no c/o pain. Pt had medications from pharmacy and personal belongings at discharge. Pt was in no apparent distress at time of discharge, taken by wheelchair with primary PCT to personal vehicle.

## 2023-03-02 NOTE — PLAN OF CARE
Goal Outcome Evaluation:  Plan of Care Reviewed With: patient        Progress: improving  Outcome Evaluation: Pt remains on room air, continues to sat around 95%. Pt still  has SOA with exertion but recovers well with resting. Pt educated on making sure to rest regularly with activity. Pt verbalized understanding. Pt reports CPAP to be delivered to home today, verbalized understanding of need to wear at night and to follow up for outpt sleep study. Pt is in no apparent distress at this time, denies any needs currently, call light in reach.

## 2023-03-02 NOTE — DISCHARGE INSTR - APPOINTMENTS
Follow up on April 17 th at 11:15 [facility] on   [address], [place], [place] [address] [phone]

## 2023-03-03 ENCOUNTER — READMISSION MANAGEMENT (OUTPATIENT)
Dept: CALL CENTER | Facility: HOSPITAL | Age: 48
End: 2023-03-03
Payer: MEDICAID

## 2023-03-03 NOTE — OUTREACH NOTE
Prep Survey    Flowsheet Row Responses   [facility] facility patient discharged from? [facility]   Is LACE score < 7 ? No   Eligibility Readm Mgmt   Discharge diagnosis Acute respiratory failure with hypoxemia    Does the patient have one of the following disease processes/diagnoses(primary or secondary)? Other   Does the patient have Home health ordered? No   Is there a DME ordered? Yes   What DME was ordered? [facility]AP[facility]   Prep survey completed? Yes          HITESH LAYNE - Registered Nurse

## 2023-03-04 LAB
A ALTERNATA IGE QN: <0.1 KU/L
BERMUDA GRASS IGE QN: <0.1 KU/L
CAT DANDER IGE QN: <0.1 KU/L
COMMON RAGWEED IGE QN: <0.1 KU/L
CONV CLASS DESCRIPTION: ABNORMAL
D FARINAE IGE QN: <0.1 KU/L
D PTERONYSS IGE QN: <0.1 KU/L
DOG DANDER IGE QN: 0.13 KU/L
ENGL PLANTAIN IGE QN: <0.1 KU/L
KENT BLUE GRASS IGE QN: <0.1 KU/L
MOUSE URINE PROT IGE QN: <0.1 KU/L
WHITE ELM IGE QN: <0.1 KU/L
WHITE OAK IGE QN: <0.1 KU/L

## 2023-03-05 LAB
BACTERIA SPEC AEROBE CULT: NORMAL
BACTERIA SPEC AEROBE CULT: NORMAL

## 2023-03-13 ENCOUNTER — READMISSION MANAGEMENT (OUTPATIENT)
Dept: CALL CENTER | Facility: HOSPITAL | Age: 48
End: 2023-03-13
Payer: MEDICAID

## 2023-03-13 NOTE — OUTREACH NOTE
Medical Week 2 Survey    Flowsheet Row Responses   [facility] patient discharged from? [facility]   Does the patient have one of the following disease processes/diagnoses(primary or secondary)? Other   Week 2 attempt successful? Yes   Call start time 1549   Discharge diagnosis Acute respiratory failure with hypoxemia    Call end time 1551   Meds reviewed with patient/caregiver? Yes   Is the patient having any side effects they believe may be caused by any medication additions or changes? No   Does the patient have all medications ordered at discharge? Yes   Is the patient taking all medications as directed (includes completed medication regime)? Yes   Comments regarding appointments Appt with pulm is on 3/20/23   Does the patient have a primary care provider?  Yes   Does the patient have an appointment with their PCP within 7 days of discharge? Greater than 7 days   Comments regarding PCP 3/15/23   What is preventing the patient from scheduling follow up appointments within 7 days of discharge? --  [unsure]   Nursing Interventions Verified appointment date/time/provider   Has the patient kept scheduled appointments due by today? N/A   What DME was ordered? [facility]   Has all DME been delivered? Yes   Psychosocial issues? No   Did the patient receive a copy of their discharge instructions? Yes   Nursing interventions Reviewed instructions with patient   What is the patient's perception of their health status since discharge? Improving   Is the patient/caregiver able to teach back signs and symptoms related to disease process for when to call PCP? Yes   Is the patient/caregiver able to teach back signs and symptoms related to disease process for when to call 911? Yes   Is the patient/caregiver able to teach back the hierarchy of who to call/visit for symptoms/problems? PCP, Specialist, Home health nurse, Urgent Care, ED, 911 Yes   If the patient is a current smoker, are they able to teach back resources for  cessation? Not a smoker   Week 2 Call Completed? Yes   Is the patient interested in additional calls from an ambulatory ?  NOTE:  applies to high risk patients requiring additional follow-up. No          Jade LUONG - Registered Nurse

## 2023-03-20 ENCOUNTER — OFFICE VISIT (OUTPATIENT)
Dept: PULMONOLOGY | Facility: CLINIC | Age: 48
End: 2023-03-20
Payer: MEDICAID

## 2023-03-20 VITALS
SYSTOLIC BLOOD PRESSURE: 151 MMHG | DIASTOLIC BLOOD PRESSURE: 89 MMHG | HEART RATE: 80 BPM | TEMPERATURE: 97.8 F | BODY MASS INDEX: 44.41 KG/M2 | HEIGHT: 68 IN | RESPIRATION RATE: 18 BRPM | WEIGHT: 293 LBS | OXYGEN SATURATION: 99 %

## 2023-03-20 DIAGNOSIS — J96.01 ACUTE RESPIRATORY FAILURE WITH HYPOXEMIA: Primary | ICD-10-CM

## 2023-03-20 DIAGNOSIS — G47.33 OSA (OBSTRUCTIVE SLEEP APNEA): ICD-10-CM

## 2023-03-20 LAB — EXHALED NITROUS OXIDE: 20

## 2023-03-20 PROCEDURE — 3077F SYST BP >= 140 MM HG: CPT | Performed by: NURSE PRACTITIONER

## 2023-03-20 PROCEDURE — 99214 OFFICE O/P EST MOD 30 MIN: CPT | Performed by: NURSE PRACTITIONER

## 2023-03-20 PROCEDURE — 3079F DIAST BP 80-89 MM HG: CPT | Performed by: NURSE PRACTITIONER

## 2023-03-20 PROCEDURE — 95012 NITRIC OXIDE EXP GAS DETER: CPT | Performed by: NURSE PRACTITIONER

## 2023-03-20 RX ORDER — ALBUTEROL SULFATE 90 UG/1
2 AEROSOL, METERED RESPIRATORY (INHALATION) EVERY 4 HOURS PRN
Qty: 8.5 G | Refills: 11 | Status: SHIPPED | OUTPATIENT
Start: 2023-03-20

## 2023-03-20 RX ORDER — CETIRIZINE HYDROCHLORIDE 10 MG/1
10 TABLET ORAL DAILY
Qty: 90 TABLET | Refills: 3 | Status: SHIPPED | OUTPATIENT
Start: 2023-03-20

## 2023-03-20 RX ORDER — IPRATROPIUM BROMIDE AND ALBUTEROL SULFATE 2.5; .5 MG/3ML; MG/3ML
3 SOLUTION RESPIRATORY (INHALATION) EVERY 4 HOURS PRN
Qty: 360 ML | Refills: 5 | Status: SHIPPED | OUTPATIENT
Start: 2023-03-20 | End: 2023-04-19

## 2023-03-20 RX ORDER — MONTELUKAST SODIUM 10 MG/1
10 TABLET ORAL NIGHTLY
Qty: 90 TABLET | Refills: 3 | Status: SHIPPED | OUTPATIENT
Start: 2023-03-20

## 2023-03-20 RX ORDER — PREDNISONE 20 MG/1
40 TABLET ORAL DAILY
Qty: 14 TABLET | Refills: 0 | Status: SHIPPED | OUTPATIENT
Start: 2023-03-20

## 2023-03-20 RX ORDER — FLUTICASONE PROPIONATE AND SALMETEROL 250; 50 UG/1; UG/1
1 POWDER RESPIRATORY (INHALATION)
Qty: 60 EACH | Refills: 11 | Status: SHIPPED | OUTPATIENT
Start: 2023-03-20

## 2023-03-20 NOTE — PROGRESS NOTES
Primary Care Provider  Shubham Alejandro APRN   Referring Provider  No ref. provider found    Patient Complaint  Bronchospams and Acute respiratory failure with hypoxemia       SUBJECTIVE    History of Presenting Illness  Mere Ivory is a pleasant 47 y.o. female who presents to [facility] for hospital follow-up.  Patient was admitted to [facility] 2/28/2023 through 3/2/2023 for acute hypoxic respiratory failure rhinovirus infection suspected acute asthma exacerbation.  Patient is here for follow-up.  Patient's hospital course includes the following: past medical history of hypertension obesity BMI 52 presents with acute shortness of breath started on the morning of presentation.  Patient has been having intermittent shortness of breath over the past month has been treated with steroids and breathing treatments with improvement until patient completes course of steroids when shortness of breath recurs.Patient endorses nonproductive cough, denies fever.  Patient denies recent travel.  Patient states that started as a postnasal drip and sinus congestion earlier in the month when she and her  both had head colds.  On the morning of presentation patient states she was unable to catch her breath or even take a couple steps due to shortness of breath.  Shortness of breath not improved with breathing treatments.  Patient presented to the emergency department for further evaluation and treatment.  On presentation patient found to be in respiratory distress with diffuse wheezing bilaterally.  Patient given Solu-Medrol and breathing treatment with some improvement.  ABG demonstrated hypoxia.  Patient started on 2 L nasal cannula to keep sats greater than 90%.  Chest x-ray negative for any acute infiltrate.  Flu RSV and COVID-negative.  D-dimer within normal limits.  Pro-Ramesh within normal limits.  Troponin mildly elevated and trending down.   Patient denies chest pain.  Suspect due to hypoxia. Pulmonology contacted by ED and agreed to consult.  Hospitalist service contacted for admission for further evaluation and treatment of suspected asthma with acute exacerbation and hypoxic respiratory failure.  Continue on inhaled bronchodilators and systemic steroids.  Respiratory status slowly improving.  Slowly weaned to room air.  Patient amenable to renting CPAP until able to follow-up for outpatient sleep testing.  Patient able to be weaned to room air.  Patient seen evaluated on day discharge and thus stable for discharge home on Advair as well as as needed albuterol and prednisone taper to follow-up with her primary care provider and pulmonology.    At present time patient states she is not having any dyspnea, coughing, wheezing, headaches, chest pain, weight loss or hemoptysis. Denies fevers, chills and night sweats.  Patient states after having been on several rounds of steroids with recurrence of her shortness of air she is concerned that she is on her last week of the taper dose of steroids that her symptoms will return.  She currently is not taking any allergy medicine.  She is on Advair and DuoNebs at this time. Mere Ivory is able to perform ADLs without difficulties and denies any swollen glands/lymph nodes in the head or neck.    I have personally reviewed the review of systems, past family, social, medical and surgical histories; and agree with their findings.    Review of Systems  Constitutional symptoms:  Denied complaints   Ear, nose, throat: Denied complaints  Cardiovascular:  Denied complaints  Respiratory: Denied complaints  Gastrointestinal: Denied complaints  Musculoskeletal: Denied complaints    Family History   Problem Relation Age of Onset   • Diabetes Mother    • Hypertension Mother    • Stroke Neg Hx         Social History     Socioeconomic History   • Marital status:    Tobacco Use   • Smoking status: Former      Packs/day: 0.00     Years: 0.00     Pack years: 0.00     Types: Cigarettes     Quit date: 2012     Years since quittin.2   • Smokeless tobacco: Never   • Tobacco comments:     QUIT 2012 WAS A SOCIAL SMOKER FOR 18 YEARS   Vaping Use   • Vaping Use: Never used   Substance and Sexual Activity   • Alcohol use: Yes     Comment: RARELY   • Drug use: Never   • Sexual activity: Defer        Past Medical History:   Diagnosis Date   • Achilles tendon rupture, right, subsequent encounter    • Ankle pain, right    • Arthritis    • HTN (hypertension)         Immunization History   Administered Date(s) Administered   • COVID-19 (PFIZER) PURPLE CAP 03/10/2021, 2021   • Flu Vaccine Quad PF >36MO 10/13/2020   • FluLaval/Fluzone >6mos 10/13/2020, 2021, 2022   • Influenza, Unspecified 10/01/2019   • PPD Test 2019, 2021, 2022   • Tdap 2019       No Known Allergies       Current Outpatient Medications:   •  albuterol sulfate  (90 Base) MCG/ACT inhaler, Inhale 2 puffs Every 4 (Four) Hours As Needed for Wheezing., Disp: 8.5 g, Rfl: 11  •  ipratropium-albuterol (DUO-NEB) 0.5-2.5 mg/3 ml nebulizer, Take 3 mL by nebulization Every 4 (Four) Hours As Needed for Shortness of Air or Wheezing for up to 30 days., Disp: 360 mL, Rfl: 5  •  losartan (COZAAR) 100 MG tablet, TAKE 1 TABLET BY MOUTH EVERY DAY (Patient taking differently: Take 1 tablet by mouth Daily.), Disp: 90 tablet, Rfl: 1  •  predniSONE (DELTASONE) 10 MG tablet, Take 4 tablets by mouth Daily for 4 days, THEN 3 tablets Daily for 7 days, THEN 2 tablets Daily for 7 days, THEN 1 tablet Daily for 7 days., Disp: 58 tablet, Rfl: 0  •  cetirizine (zyrTEC) 10 MG tablet, Take 1 tablet by mouth Daily., Disp: 90 tablet, Rfl: 3  •  Fluticasone-Salmeterol (ADVAIR/WIXELA) 250-50 MCG/ACT DISKUS, Inhale 1 puff 2 (Two) Times a Day., Disp: 60 each, Rfl: 11  •  montelukast (SINGULAIR) 10 MG tablet, Take 1 tablet by mouth Every Night., Disp: 90  "tablet, Rfl: 3  •  predniSONE (DELTASONE) 20 MG tablet, Take 2 tablets by mouth Daily., Disp: 14 tablet, Rfl: 0           Vital Signs   /89 (BP Location: Right arm, Patient Position: Sitting, Cuff Size: Adult)   Pulse 80   Temp 97.8 °F (36.6 °C) (Temporal)   Resp 18   Ht 172.7 cm (68\")   Wt (!) 161 kg (356 lb)   SpO2 99% Comment: room air  BMI 54.13 kg/m²       OBJECTIVE    Physical Exam  Vital Signs Reviewed   WDWN, Alert, NAD.    HEENT:  PERRL, EOMI.  OP, nares clear  Neck:  Supple, no JVD, no thyromegaly  Chest:  good aeration, clear to auscultation bilaterally, tympanic to percussion bilaterally, no work of breathing noted  CV: RRR, no MGR, pulses 2+, equal.  Abd:  Soft, NT, ND, + BS, no HSM  EXT:  no clubbing, no cyanosis, no edema  Neuro:  A&Ox3, CN grossly intact, no focal deficits.  Skin: No rashes or lesions noted    Results Review  I have personally reviewed the hospital records, diagnostics.    CT Chest With Contrast Diagnostic [[id number]] (Order [id number])  Order  Status: Final result     Appointment Information    PACS Images     Radiology Images  Study Result    Narrative & Impression   PROCEDURE:  CT CHEST W CONTRAST DIAGNOSTIC     COMPARISON:  [facility], CR, XR CHEST 1 VW, 2/09/2023, 3:58.  INDICATIONS:  Pulmonary embolism (PE) suspected  DYSPNEA AND RIGHT CHEST TIGHTNESS     TECHNIQUE:    After obtaining the patient's consent, CT images were obtained with non-ionic   intravenous contrast material.       PROTOCOL:     Pulmonary embolism imaging protocol performed                 RADIATION:      DLP: 1201.9 mGy*cm               Automated exposure control was utilized to minimize radiation dose.   CONTRAST:      80cc Isovue 370 I.V.     FINDINGS:          The central tracheobronchial tree is clear.  The lungs are clear.  There is no pleural effusion.     The heart size appears normal.  The great vessels are normal in caliber.  There is no evidence of   pulmonary embolus.  There " is mild mediastinal lymphadenopathy, with an index 1.6 x 1.6 cm right   paratracheal lymph node on image 82.     Partial evaluation of the upper abdomen is unremarkable.     No aggressive osseous lesions are identified.       IMPRESSION:                 1. Negative for pulmonary embolus.  2. No acute cardiopulmonary process.  3. Mild mediastinal lymphadenopathy, nonspecific.            LUCINA LOVE MD         Electronically Signed and Approved By: LUCINA LOVE MD on 2/09/2023 at 6:47        CT Chest With Contrast Diagnostic [[id number]] (Order [id number])  Order  Status: Final result     Appointment Information    PACS Images     Radiology Images  Study Result    Narrative & Impression   PROCEDURE:  CT CHEST W CONTRAST DIAGNOSTIC     COMPARISON:  [facility], CR, XR CHEST 1 VW, 2/09/2023, 3:58.  INDICATIONS:  Pulmonary embolism (PE) suspected  DYSPNEA AND RIGHT CHEST TIGHTNESS     TECHNIQUE:    After obtaining the patient's consent, CT images were obtained with non-ionic   intravenous contrast material.       PROTOCOL:     Pulmonary embolism imaging protocol performed                 RADIATION:      DLP: 1201.9 mGy*cm               Automated exposure control was utilized to minimize radiation dose.   CONTRAST:      80cc Isovue 370 I.V.     FINDINGS:          The central tracheobronchial tree is clear.  The lungs are clear.  There is no pleural effusion.     The heart size appears normal.  The great vessels are normal in caliber.  There is no evidence of   pulmonary embolus.  There is mild mediastinal lymphadenopathy, with an index 1.6 x 1.6 cm right   paratracheal lymph node on image 82.     Partial evaluation of the upper abdomen is unremarkable.     No aggressive osseous lesions are identified.       IMPRESSION:                 1. Negative for pulmonary embolus.  2. No acute cardiopulmonary process.  3. Mild mediastinal lymphadenopathy, nonspecific.            LUCINA LOVE MD          Electronically Signed and Approved By: LUCINA LOVE MD on 2/09/2023 at 6:47      QuantiFERON-TB Gold Plus  Order: [id number] - Reflex for Order [id number]   Status: Final result      Visible to patient: Yes (seen)      Next appt: 04/06/2023 at 09:30 AM in Respiratory Therapy ([facility] LAB ROOM 2)      Dx: Routine general medical examination a...     Specimen Information: Blood    1 Result Note       Component  Ref Range & Units 2 wk ago 3 wk ago   QuantiFERON Criteria Comment  Comment CM    Comment: QuantiFERON-TB Gold Plus is a qualitative indirect test for   M tuberculosis infection (including disease) and is intended for use   in conjunction with risk assessment, radiography, and other medical   and diagnostic evaluations. The QuantiFERON-TB Gold Plus result is   determined by subtracting the Nil value from either TB antigen (Ag)   value. The Mitogen tube serves as a control for the test.   QUANTIFERON TB1 AG VALUE  IU/mL 0.01  0.02    QUANTIFERON TB2 AG VALUE  IU/mL 0.00  0.02    QuantiFERON Nil Value  IU/mL 0.00  0.02    QuantiFERON Mitogen Value  IU/mL 1.82  0.51    Resulting Agency [facility]           Narrative  Performed by: [facility]  Performed at:   - Lab[address]  [id number]   : Dequan Milner PhD, Phone:  [phone]      Specimen Collected: 03/03/23 13:08 EST Last Resulted: 03/06/23 06:06 EST           QuantiFERON-TB Gold Plus  Order: [id number]   Status: Final result      Visible to patient: Yes (seen)      Next appt: 04/06/2023 at 09:30 AM in Respiratory Therapy ([facility] LAB ROOM 2)      Dx: Routine general medical examination a...     Specimen Information: Blood    1 Result Note       Component  Ref Range & Units 2 wk ago 3 wk ago   QuantiFERON Incubation Incubation performed.  Incubation performed.    QUANTIFERON-TB GOLD PLUS  Negative Negative  Indeterminate Abnormal  CM    Comment: No response to M tuberculosis antigens detected.    Infection with M tuberculosis is unlikely, but high risk   individuals should be considered for additional testing   (ATS/IDSA/CDC Clinical Practice Guidelines, 2017). The   reference range is an Antigen minus Nil result of <0.35 IU/mL.   Chemiluminescence immunoassay methodology   Resulting Agency [facility]           Narrative  Performed by: [facility]  Performed at:  01 - [address]  [id number]   : Dequan Milner PhD, Phone:  [phone]      Specimen Collected: 03/03/23 13:08 EST Last Resulted: 03/06/23 06:06 EST            Contains abnormal data CBC Auto Differential  Order: [id number] - Part of Panel Order [id number]   Status: Final result      Visible to patient: Yes (seen)     Specimen Information: Blood    0 Result Notes            Component  Ref Range & Units 2 wk ago  (3/2/23) 2 wk ago  (3/1/23) 2 wk ago  (2/28/23) 4 wk ago  (2/19/23) 1 mo ago  (2/9/23) 9 mo ago  (6/10/22) 1 yr ago  (11/3/21)   WBC  3.40 - 10.80 10*3/mm3 15.42 High   15.79 High   10.21  10.41  7.83  7.52  7.44    RBC  3.77 - 5.28 10*6/mm3 4.59  4.56  4.85  5.08  4.72  4.73  4.56    Hemoglobin  12.0 - 15.9 g/dL 12.9  12.9  13.4  14.2  13.1  13.0  13.0    Hematocrit  34.0 - 46.6 % 39.2  37.7  40.3  42.5  38.9  39.6  38.6    MCV  79.0 - 97.0 fL 85.4  82.7  83.1  83.7  82.4  83.7  84.6    MCH  26.6 - 33.0 pg 28.1  28.3  27.6  28.0  27.8  27.5  28.5    MCHC  31.5 - 35.7 g/dL 32.9  34.2  33.3  33.4  33.7  32.8  33.7    RDW  12.3 - 15.4 % 14.2  13.8  13.9  13.8  13.8  13.2  13.2    RDW-SD  37.0 - 54.0 fl 43.6  40.4  41.0  42.2  40.3  40.8  40.3    MPV  6.0 - 12.0 fL 10.0  9.5  9.6  10.0  9.5  10.4  10.5    Platelets  140 - 450 10*3/mm3 268  275  268  261  224  231  253    Neutrophil %  42.7 - 76.0 % 62.4  85.9 High   71.9  65.3  66.0      Lymphocyte %  19.6 - 45.3 % 25.5  8.3 Low   13.9 Low   19.6  18.4 Low       Monocyte %  5.0 - 12.0 % 5.8  3.9 Low   5.1  6.8  6.5      Eosinophil %  0.3 - 6.2 %  4.9  0.1 Low   7.6 High   6.8 High   8.0 High       Basophil %  0.0 - 1.5 % 0.4  0.2  0.6  0.7  0.6      Immature Grans %  0.0 - 0.5 % 1.0 High   1.6 High   0.9 High   0.8 High   0.5      Neutrophils, Absolute  1.70 - 7.00 10*3/mm3 9.63 High   13.56 High   7.34 High   6.80  5.16      Lymphocytes, Absolute  0.70 - 3.10 10*3/mm3 3.93 High   1.31  1.42  2.04  1.44      Monocytes, Absolute  0.10 - 0.90 10*3/mm3 0.89  0.62  0.52  0.71  0.51      Eosinophils, Absolute  0.00 - 0.40 10*3/mm3 0.75 High   0.02  0.78 High   0.71 High   0.63 High       Basophils, Absolute  0.00 - 0.20 10*3/mm3 0.06  0.03  0.06  0.07  0.05      Immature Grans, Absolute  0.00 - 0.05 10*3/mm3 0.16 High   0.25 High   0.09 High   0.08 High   0.04      nRBC  0.0 - 0.2 /100 WBC 0.0  0.0  0.0  0.0  0.0      Resulting Agency [facility]  [facility]              Specimen Collected: 03/02/23 05:22 EST Last Resulted: 03/02/23 06:38 EST            Contains abnormal data Respiratory Panel PCR w/COVID-19(SARS-CoV-2) [facility]/GRACIELA/MAKAYLA/PAD/COR/MAD/ERNESTINA In-House, NP Swab in [facility] 3-4 HR TAT - Swab, Nasopharynx  Order: [id number]   Status: Final result      Visible to patient: Yes (seen)      Next appt: 04/06/2023 at 09:30 AM in Respiratory Therapy ([facility] LAB ROOM 2)     Specimen Information: Nasopharynx; Swab    0 Result Notes      Component  Ref Range & Units    ADENOVIRUS, PCR  Not Detected Not Detected    Coronavirus 229E  Not Detected Not Detected    Coronavirus HKU1  Not Detected Not Detected    Coronavirus NL63  Not Detected Not Detected    Coronavirus OC43  Not Detected Not Detected    COVID19  Not Detected - Ref. Range Not Detected    Human Metapneumovirus  Not Detected Not Detected    Human Rhinovirus/Enterovirus  Not Detected Detected Abnormal     Influenza A PCR  Not Detected Not Detected    Influenza B PCR  Not Detected Not Detected    Parainfluenza Virus 1  Not Detected Not Detected     Parainfluenza Virus 2  Not Detected Not Detected    Parainfluenza Virus 3  Not Detected Not Detected    Parainfluenza Virus 4  Not Detected Not Detected    RSV, PCR  Not Detected Not Detected    Bordetella pertussis pcr  Not Detected Not Detected    Bordetella parapertussis PCR  Not Detected Not Detected    Chlamydophila pneumoniae PCR  Not Detected Not Detected    Mycoplasma pneumo by PCR  Not Detected Not Detected    Resulting Agency [facility]           Narrative  Performed by: [facility]  In the setting of a positive respiratory panel with a viral infection PLUS a negative procalcitonin without other underlying concern for bacterial infection, consider observing off antibiotics or discontinuation of antibiotics and continue supportive care. If the respiratory panel is positive for atypical bacterial infection (Bordetella pertussis, Chlamydophila pneumoniae, or Mycoplasma pneumoniae), consider antibiotic de-escalation to target atypical bacterial infection.      Specimen Collected: 02/28/23 18:52 EST Last Resulted: 02/28/23 22:21 EST           BENJAMIN  Order: [id number]   Status: Final result      Visible to patient: Yes (seen)      Next appt: 04/06/2023 at 09:30 AM in Respiratory Therapy ([facility] LAB ROOM 2)     Specimen Information: Blood    0 Result Notes      Component  Ref Range & Units 2 wk ago   dsDNA  Negative Negative    Expanded LENCHO Screen  Negative Negative    Resulting Agency [facility]              Specimen Collected: 02/28/23 16:24 EST Last Resulted: 03/02/23 17:00 EST         Upper Resp. [facility] Immunocap Profile  Order: [id number]   Status: Final result      Visible to patient: Yes (seen)      Next appt: 04/06/2023 at 09:30 AM in Respiratory Therapy ([facility] LAB ROOM 2)     Specimen Information: Blood    0 Result Notes       Component  Ref Range & Units 2 wk ago  (2/28/23) 2 wk ago  (2/28/23)   ELM TREE IGE  <0.35 kU/L <0.10     OAK TREE IGE  <0.35 kU/L <0.10     PECAN TREE  IGE  <0.35 kU/L <0.10     WALNUT TREE IGE  <0.35 kU/L <0.10     BERMUDA GRASS IGE  <0.35 kU/L <0.10     [place] BLUEGRASS IGE  <0.35 kU/L <0.10     Immunocap Sesar Grass IgE  <0.35 kU/L <0.10     LAMB QUARTERS IGE  <0.35 kU/L <0.10     SHORT/COMMON RAGWEED  <0.35 kU/L <0.10     CAT DANDER IGE  <0.35 kU/L <0.10     DOG DANDER IGE  <0.35 kU/L 0.12     D. PTERONYSSIUS  <0.35 kU/L <0.10     MOLD ALTERNARIA ALTERNATA  <0.35 kU/L <0.10     COCKROACH,Iraqi IGE  <0.35 kU/L <0.10     D.FARINAE IGE  <0.35 kU/L <0.10     MOUSE URINE IGE  <0.35 kU/L <0.10     CLADOSPORIUM IGE  <0.35 kU/L <0.10     Birch Tree IgE  <0.35 kU/L <0.10     EWA GRASS IGE  <0.35 kU/L <0.10     PENICILLIUM NOTATUM IGE  <0.35 kU/L <0.10     IMMUNOCAP JUNIPER TREE IGE  <0.35 kU/L <0.10     SYCAMORE TREE IGE  <0.35 kU/L <0.10     MAPLE/BOX ELDER IGE  <0.35 kU/L <0.10     ASPERGILLUS FUMIGATUS IGE  <0.35 kU/L <0.10     WHITE DNOTAE TREE IGE  <0.35 kU/L <0.10     COTTONWOOD TREE IGE  <0.35 kU/L <0.10     WHITE MULBERRY IGE  <0.35 kU/L <0.10     PIGWEED IGE  <0.35 kU/L <0.10     IMMUNOGLOBULIN E  <25 kU/L 42.9 High   38.5 High     Resulting Agency [facility]           Narrative  Performed by: [facility]  Total IgE Reference Ranges   --------------------------   <25 kU/L Normal    kU/L Borderline   >100 kU/L Elevated       Unicap Specific IgE Reference Ranges   ----------------------------------------------------------   kU/L Class Level Interpretation   <0.10 0 No Significant Level   0.10-0.34 0/1 Clinical Relevance Undetermined   0.35 - 0.70 01 Low   0.71 - 3.50 02 Moderate   3.51 - 17.50 03 High   17.51 - 50.00 04 Very High   50.01 - 100.0 05 Very High   > 100.00 06 Very High   ----------------------------------------------------------   Although increasing class ranges are reflective of increasing concentration of allergen specific IgE, this may not correlate with the degree of clinical response when challenged with this specific allergen.  The correlation of allergy laboratory results with clinical history is essential. A negative test may not rule out clinical allergy.      Specimen Collected: 02/28/23 16:24 EST Last Resulted: 02/28/23 19:55 EST                Contains abnormal data Comprehensive Metabolic Panel  Order: [id number]   Status: Final result      Visible to patient: Yes (seen)      Next appt: 04/06/2023 at 09:30 AM in Respiratory Therapy ([facility] LAB ROOM 2)     Specimen Information: Blood    0 Result Notes          Component  Ref Range & Units 2 wk ago 4 wk ago 1 mo ago 9 mo ago 1 yr ago   Glucose  65 - 99 mg/dL 110 High   100 High   105 High   88  87    BUN  6 - 20 mg/dL 11  7  8  8  11    Creatinine  0.57 - 1.00 mg/dL 0.63  0.73  0.65  0.59  0.68    Sodium  136 - 145 mmol/L 138  139  139  136  136    Potassium  3.5 - 5.2 mmol/L 3.9  4.2  4.0  4.1  4.3    Chloride  98 - 107 mmol/L 100  101  103  99  101    CO2  22.0 - 29.0 mmol/L 27.1  27.1  23.5  25.0  26.8    Calcium  8.6 - 10.5 mg/dL 8.9  9.6  9.3  9.2  9.0    Total Protein  6.0 - 8.5 g/dL 7.3  8.2  7.9  7.5  7.7    Albumin  3.5 - 5.2 g/dL 4.2  4.5  4.1  4.20 R  4.10 R    ALT (SGPT)  1 - 33 U/L 39 High   31  25  29  23    AST (SGOT)  1 - 32 U/L 30  23  21  24  18    Alkaline Phosphatase  39 - 117 U/L 69  77  72  57  62    Total Bilirubin  0.0 - 1.2 mg/dL 1.0  0.9  0.8  1.1  0.9    Globulin  gm/dL 3.1  3.7  3.8  3.3  3.6    A/G Ratio  g/dL 1.4  1.2  1.1  1.3  1.1    BUN/Creatinine Ratio  7.0 - 25.0 17.5  9.6  12.3  13.6  16.2    Anion Gap  5.0 - 15.0 mmol/L 10.9  10.9  12.5  12.0  8.2    eGFR  >60.0 mL/min/1.73 108.9  101.0  108.1  111.3 CM     Resulting Agency [facility]  [facility]           Narrative  Performed by: [facility]  GFR Normal >60   Chronic Kidney Disease <60   Kidney Failure <15       Specimen Collected: 02/28/23 08:32 EST Last Resulted: 02/28/23 09:11 EST                Limited Transthoracic Echocardiogram with Limited Doppler, Color  "Flow and Contrast  Order# [id number]  Reading physician: Eddie Ovalle MD Ordering physician: Linda Navarro APRN Study date: 23     Patient Information    Patient Name   Mere Ivory MRN   [id number] Legal Sex   Female  (Age)   1975 (47 y.o.)     Patient Hx Of Height, Weight, and Vitals    Height Weight BSA (Calculated - sq m) BMI (Calculated) Retired BMI (kg/m2) Pulse BP   172.7 cm (68\") 157 kg (346 lb 2 oz) 2.58 sq meters 52.6  78 132/78     [facility] PACS Images     Show images for Adult Transthoracic Echo Limited W/ Cont if Necessary Per Protocol ISCV PACS Images     Show images for Adult Transthoracic Echo Limited W/ Cont if Necessary Per Protocol   Clinical Indication    Dyspnea     Interpretation Summary    Normal left ventricular systolic function.  No significant valve abnormalities noted.     Cardiac History    Diagnosis Date Comment Source   HTN (hypertension)        Social History    Tobacco Use    Former; Cigarettes: Quit 2012   Smokeless Tobacco: Never used smokeless tobacco.   Comments: QUIT  WAS A SOCIAL SMOKER FOR 18 YEARS     Vaping Use    Never used      Family Cardiac History as of 3/1/2023    Problem Relation Age of Onset   Diabetes Mother    Hypertension Mother      Additional Study Details    Study Quality The study is technically difficult for diagnosis. The quality of the study is limited due to limited views obtained, patient body habitus, patient positioning and lung diseasewith poor acoustic windows in the parasternal window, apical window and subcostal window. * Limited echo ordered for dyspnea .   Enhancement Agent Details Verbal consent was obtained from the patient to use Lumason image enhancer in order to optimize the study. The use of Lumason was indicated to improve delineation of the left ventricular endocardial border.  4 mL of Lumason was manually activated.  A total of 4 mL of the activated Lumason was administered and the remaining " contrast was wasted and discarded.   No adverse reaction to image enhancer was noted.     Echocardiogram Findings    Left Ventricle Left ventricular systolic function is normal. Calculated left ventricular EF = 65%     Normal left ventricular cavity size and wall thickness noted. All left ventricular wall segments contract normally.   Right Ventricle Normal right ventricular cavity size, wall thickness, systolic function and septal motion noted.   Left Atrium Normal left atrial size and volume noted.   Right Atrium Normal right atrial cavity size noted.   Aortic Valve The aortic valve is structurally normal with no regurgitation or stenosis present.   Mitral Valve The mitral valve is structurally normal with no regurgitation or significant stenosis present.   Tricuspid Valve The tricuspid valve is structurally normal with no significant regurgitation or significant stenosis present.   Pulmonic Valve The pulmonic valve is not well visualized. The pulmonic valve is structurally normal with no regurgitation or significant stenosis present.   Greater Vessels No dilation of the aortic root is present.                    ASSESSMENT         Patient Active Problem List   Diagnosis   • Arthritis   • Hypertension   • Achilles tendon rupture, right, subsequent encounter   • Acute respiratory failure with hypoxemia (HCC)       Encounter Diagnoses   Name Primary?   • Acute respiratory failure with hypoxemia (HCC) Yes   • BRANDT (obstructive sleep apnea)       PLAN  Patient will be having a pulmonary function test this has been ordered by Dr. Lorenz and scheduled for 4/6/2023 at 9:30 AM.  Referral to sleep medicine for BRANDT.  Reviewed with patient today her diagnostics. Alpha 1 performed in office today. FENO 20  in office.  We will start patient on Singulair Zyrtec continue with Advair but patient request Advair discus.  Refills on albuterol and DuoNeb sent to her pharmacy.  I have also given patient a prescription of prednisone burst  to have on hand should she develop symptoms with instructions in use.  Patient verbalizes understanding.      Diagnoses and all orders for this visit:    1. Acute respiratory failure with hypoxemia (HCC) (Primary)  -     Ambulatory Referral to Sleep Medicine  -     Alpha - 1 - Antitrypsin; Future  -     Fluticasone-Salmeterol (ADVAIR/WIXELA) 250-50 MCG/ACT DISKUS; Inhale 1 puff 2 (Two) Times a Day.  Dispense: 60 each; Refill: 11  -     ipratropium-albuterol (DUO-NEB) 0.5-2.5 mg/3 ml nebulizer; Take 3 mL by nebulization Every 4 (Four) Hours As Needed for Shortness of Air or Wheezing for up to 30 days.  Dispense: 360 mL; Refill: 5  -     montelukast (SINGULAIR) 10 MG tablet; Take 1 tablet by mouth Every Night.  Dispense: 90 tablet; Refill: 3  -     cetirizine (zyrTEC) 10 MG tablet; Take 1 tablet by mouth Daily.  Dispense: 90 tablet; Refill: 3  -     albuterol sulfate  (90 Base) MCG/ACT inhaler; Inhale 2 puffs Every 4 (Four) Hours As Needed for Wheezing.  Dispense: 8.5 g; Refill: 11  -     POCT FENO Test  -     predniSONE (DELTASONE) 20 MG tablet; Take 2 tablets by mouth Daily.  Dispense: 14 tablet; Refill: 0    2. BRANDT (obstructive sleep apnea)  -     Ambulatory Referral to Sleep Medicine  -     Alpha - 1 - Antitrypsin; Future  -     Fluticasone-Salmeterol (ADVAIR/WIXELA) 250-50 MCG/ACT DISKUS; Inhale 1 puff 2 (Two) Times a Day.  Dispense: 60 each; Refill: 11  -     ipratropium-albuterol (DUO-NEB) 0.5-2.5 mg/3 ml nebulizer; Take 3 mL by nebulization Every 4 (Four) Hours As Needed for Shortness of Air or Wheezing for up to 30 days.  Dispense: 360 mL; Refill: 5  -     montelukast (SINGULAIR) 10 MG tablet; Take 1 tablet by mouth Every Night.  Dispense: 90 tablet; Refill: 3  -     cetirizine (zyrTEC) 10 MG tablet; Take 1 tablet by mouth Daily.  Dispense: 90 tablet; Refill: 3  -     albuterol sulfate  (90 Base) MCG/ACT inhaler; Inhale 2 puffs Every 4 (Four) Hours As Needed for Wheezing.  Dispense: 8.5 g;  Refill: 11  -     POCT FENO Test           Smoking status: Former  Vaccination status: Up to date with COVID and flu  Medications personally reviewed    Follow Up  Return in about 6 weeks (around 5/1/2023) for with Dr. Lorenz.    Patient was given instructions and counseling regarding her condition or for health maintenance advice. Please see specific information pulled into the AVS if appropriate.

## 2023-03-22 ENCOUNTER — READMISSION MANAGEMENT (OUTPATIENT)
Dept: CALL CENTER | Facility: HOSPITAL | Age: 48
End: 2023-03-22
Payer: MEDICAID

## 2023-03-22 NOTE — OUTREACH NOTE
Medical Week 3 Survey    Flowsheet Row Responses   [facility] patient discharged from? [facility]   Does the patient have one of the following disease processes/diagnoses(primary or secondary)? Other   Week 3 attempt successful? No   Unsuccessful attempts Attempt 1   Discharge diagnosis Acute respiratory failure with hypoxemia           Kaitlynn KHAN - Registered Nurse

## 2023-04-06 ENCOUNTER — HOSPITAL ENCOUNTER (OUTPATIENT)
Dept: RESPIRATORY THERAPY | Facility: HOSPITAL | Age: 48
Discharge: HOME | End: 2023-04-06
Admitting: STUDENT IN AN ORGANIZED HEALTH CARE EDUCATION/TRAINING PROGRAM
Payer: COMMERCIAL

## 2023-04-06 DIAGNOSIS — J96.01 ACUTE RESPIRATORY FAILURE WITH HYPOXEMIA: ICD-10-CM

## 2023-04-06 PROCEDURE — 94726 PLETHYSMOGRAPHY LUNG VOLUMES: CPT

## 2023-04-06 PROCEDURE — 94729 DIFFUSING CAPACITY: CPT

## 2023-04-06 PROCEDURE — 94060 EVALUATION OF WHEEZING: CPT

## 2023-04-06 RX ORDER — LEVALBUTEROL INHALATION SOLUTION 1.25 MG/3ML
1.25 SOLUTION RESPIRATORY (INHALATION) ONCE
Status: COMPLETED | OUTPATIENT
Start: 2023-04-06 | End: 2023-04-06

## 2023-04-06 RX ADMIN — LEVALBUTEROL HYDROCHLORIDE 1.25 MG: 1.25 SOLUTION RESPIRATORY (INHALATION) at 10:22

## 2023-04-17 ENCOUNTER — OFFICE VISIT (OUTPATIENT)
Dept: SLEEP MEDICINE | Facility: HOSPITAL | Age: 48
End: 2023-04-17
Payer: COMMERCIAL

## 2023-04-17 VITALS
HEART RATE: 73 BPM | OXYGEN SATURATION: 97 % | BODY MASS INDEX: 44.41 KG/M2 | DIASTOLIC BLOOD PRESSURE: 75 MMHG | WEIGHT: 293 LBS | HEIGHT: 68 IN | SYSTOLIC BLOOD PRESSURE: 137 MMHG

## 2023-04-17 DIAGNOSIS — G47.8 NON-RESTORATIVE SLEEP: ICD-10-CM

## 2023-04-17 DIAGNOSIS — R09.02 HYPOXEMIA: ICD-10-CM

## 2023-04-17 DIAGNOSIS — E66.01 CLASS 3 SEVERE OBESITY WITH BODY MASS INDEX (BMI) OF 50.0 TO 59.9 IN ADULT, UNSPECIFIED OBESITY TYPE, UNSPECIFIED WHETHER SERIOUS COMORBIDITY PRESENT: ICD-10-CM

## 2023-04-17 DIAGNOSIS — R06.81 WITNESSED EPISODE OF APNEA: Primary | ICD-10-CM

## 2023-04-17 DIAGNOSIS — R06.83 SNORING: ICD-10-CM

## 2023-04-17 PROCEDURE — 99204 OFFICE O/P NEW MOD 45 MIN: CPT | Performed by: NURSE PRACTITIONER

## 2023-04-17 PROCEDURE — G0463 HOSPITAL OUTPT CLINIC VISIT: HCPCS

## 2023-04-17 NOTE — PROGRESS NOTES
[facility] [address]  [facility] [address]  Phone: [phone]  Fax: [phone]        Mere Ivory  [id number]   1975  47 y.o.  female      Referring Provider and PCP: Shubham Alejandro APRN    Type of service: Initial Sleep Medicine Consult.  Date of service: 4/17/2023      CHIEF COMPLAINT: Witnessed apnea, hypoxemia      HISTORY OF PRESENT ILLNESS:  Thank you for asking us to see Mere Ivory.  Mere Ivory 47 y.o. was seen today on 4/17/2023 at [facility].  Patient presents today with symptoms of snoring, non-restorative sleep, and witnessed apneas. The symptoms have been present for years and are persistent in nature.  Patient has no history of tonsillectomy, adenoidectomy, nasal surgery, UPPP.   She was recently hospitalized with acute respiratory failure and hypoxemia.  She was discharged with CPAP rental until she could get in for outpatient sleep study.  She had PFTs but results are pending.  She reports she has suspected asthma.  She has an appointment with bariatric surgery tomorrow as well to discuss options for weight.      SLEEP HISTORY:  Sleep schedule:  Bedtime: 10 to 11 PM  Wake time: 430 to 6 AM  Normally takes about 60 minutes to fall asleep  Average hours of sleep: 6-7  Number of naps per day: 0    Symptoms:   In addition to the above, patient reports the following associated symptoms:  Have you ever awakened gasping for breath, coughing, choking: Yes   Change in weight:  Yes, gained 100 pounds  Morning headaches:  Yes   Awaken with a sore throat or dry mouth:  Yes   Leg jerking at night:  No   Crawly feeling/urge sensation to move in the legs: No   Teeth grinding: No   Have you ever awakened at night with a sour taste or burning sensation in your chest:  No   Do you have muscle weakness with laughing or anger:  No   Have you ever felt paralyzed while going to sleep or waking up:  No   Sleepwalking:  "No   Nightmares: No   Nocturia (urination at night): 0 times per night  Memory Problem: No     Medical Conditions (PMH):   1. Hypertension  2. Questionable asthma  3. History of acute respiratory failure with hypoxemia    Social history:  Do you drive a commercial vehicle:  No   Shift work:  No --not currently, will be working night shift in a couple of months  Tobacco use:  Yes, former smoker  Alcohol use: 0 per week  Caffeinated drinks: 2 per day  Occupation: RN    Family History (parents and siblings) (pertaining to sleep medicine):  1. Obesity    Medications: reviewed    Allergies:  Patient has no known allergies.      REVIEW OF SYSTEMS:  Pertinent positive symptoms are:  • Snoring  • Witnessed apnea  • Daytime excessive sleepiness with [place] Sleepiness Scale of Total score: 3   • Fatigue  • Dyspnea        PHYSICAL EXAM:  CONSTITUTINONAL:   Vitals:    04/17/23 1100   BP: 137/75   Pulse: 73   SpO2: 97%   Weight: (!) 162 kg (357 lb 9.6 oz)   Height: 172.7 cm (68\")    Body mass index is 54.37 kg/m².   HEAD: atraumatic, normocephalic   NOSE: nasal passages are clear  THROAT: Mallampati class II-III  NECK: Neck Circumference: 19 inches, trachea is midline  RESPIRATORY SYSTEM: Breath sounds are normal, breathing unlabored, no wheezing present on exam  CARDIOVASULAR SYSTEM: Heart sounds are regular rhythm and normal rate, no edema  NEUROLOGICAL SYSTEM: Alert and oriented x 3, normal gait  PSYCHIATRIC SYSTEM: Mood is normal/ appropriate     Office notes from care team reviewed. Office note dated 3/20/23, reviewed.          ASSESSMENT AND PLAN:   · Witnessed apnea (R06.81): patient's symptoms and physical examination are consistent with sleep apnea (G47.30).   I discussed the signs, symptoms, and pathophysiology of sleep apnea with this patient.  I also discussed the potential complications of untreated sleep apnea including but not limited to resistant hypertension, insulin resistance, pulmonary hypertension, atrial " fibrillation, stroke, nonrestorative sleep with hypersomnia which can increase risk for motor vehicle accidents, etc.   Different testing methods including home-based and lab based sleep studies were discussed with this patient.   Based on patient history and physical examination, the most appropriate study is in-lab polysomnogram.  The order for the sleep study is placed in [facility].  The test will be scheduled after prior authorization has been obtained through patient's insurance.  Treatment and management will be discussed in more detail with this patient after the test is completed.  All questions were answered to patient's satisfaction.   · Snoring (R06.83): snoring is the sound created by turbulent airflow vibrating upper airway soft tissue.  I have also discussed factors affecting snoring including sleep deprivation, sleeping on the back and alcohol ingestion. To minimize snoring, patient is advised to have adequate sleep, sleep on their side, and avoid alcohol and sedative medications around bedtime.   · Excessive daytime sleepiness: [place] Sleepiness Scale of Total score: 3.  There are many causes of excessive daytime sleepiness including but not limited to sleep apnea, shiftwork syndrome, depression, and other medical disorders such as heart disease, kidney disease, and liver failure.  The most serious cause of excessive sleepiness is due to neurological conditions such as narcolepsy/cataplexy.  More commonly excessive sleepiness is caused by sleep apnea with frequent awakenings during sleep time.  I have discussed safety of driving and to remain vigilant while driving.  · Severe obesity: Body mass index is 54.37 kg/m².. Patients who are overweight or obese are at increased risk of sleep apnea/ sleep disordered breathing. Weight reduction and healthy lifestyle are encouraged in overweight/ obese patients as part of a comprehensive approach to sleep apnea treatment.  Sees bariatric surgery tomorrow.  · Hx  respiratory failure with hypoxemia: Following with pulmonology.  PFT results pending.  Possible asthma and sleep apnea contributing.  Check PSG as above.  Continue to follow with pulmonology for further recommendations.    I have also discussed with the patient the following  • Adequate amount of sleep: most people need around 7 to 8 hours of sleep each night        Patient will follow-up after study, 31-90 days after PAP initiated if applicable, or contact the office sooner for questions or concerns. Patient's questions were answered.            Thank you again for asking me to consult on this patient.  Please do not hesitate to call me if you have additional questions or concerns.       Paty Bailey DNP, APRN  [facility]

## 2023-04-18 ENCOUNTER — LAB (OUTPATIENT)
Dept: LAB | Facility: HOSPITAL | Age: 48
End: 2023-04-18
Payer: COMMERCIAL

## 2023-04-18 ENCOUNTER — CONSULT (OUTPATIENT)
Dept: BARIATRICS/WEIGHT MGMT | Facility: CLINIC | Age: 48
End: 2023-04-18
Payer: COMMERCIAL

## 2023-04-18 VITALS
SYSTOLIC BLOOD PRESSURE: 148 MMHG | WEIGHT: 293 LBS | HEIGHT: 69 IN | DIASTOLIC BLOOD PRESSURE: 93 MMHG | BODY MASS INDEX: 43.4 KG/M2 | TEMPERATURE: 97.8 F | HEART RATE: 96 BPM

## 2023-04-18 DIAGNOSIS — I10 PRIMARY HYPERTENSION: ICD-10-CM

## 2023-04-18 DIAGNOSIS — R06.83 SNORING: ICD-10-CM

## 2023-04-18 DIAGNOSIS — Z01.818 PRE-OP EVALUATION: ICD-10-CM

## 2023-04-18 DIAGNOSIS — E28.2 PCOS (POLYCYSTIC OVARIAN SYNDROME): ICD-10-CM

## 2023-04-18 DIAGNOSIS — E78.5 BORDERLINE HYPERLIPIDEMIA: ICD-10-CM

## 2023-04-18 DIAGNOSIS — E66.01 MORBID OBESITY WITH BMI OF 50.0-59.9, ADULT: ICD-10-CM

## 2023-04-18 DIAGNOSIS — R12 HEARTBURN: ICD-10-CM

## 2023-04-18 DIAGNOSIS — E66.01 MORBID OBESITY WITH BMI OF 50.0-59.9, ADULT: Primary | ICD-10-CM

## 2023-04-18 PROBLEM — J96.01 ACUTE RESPIRATORY FAILURE WITH HYPOXEMIA: Status: RESOLVED | Noted: 2023-02-28 | Resolved: 2023-04-18

## 2023-04-18 PROBLEM — Z71.3 DIETARY COUNSELING: Status: ACTIVE | Noted: 2023-04-18

## 2023-04-18 PROBLEM — S86.011D ACHILLES TENDON RUPTURE, RIGHT, SUBSEQUENT ENCOUNTER: Status: RESOLVED | Noted: 2022-03-15 | Resolved: 2023-04-18

## 2023-04-18 PROBLEM — N92.6 IRREGULAR MENSTRUAL CYCLE: Status: RESOLVED | Noted: 2023-04-18 | Resolved: 2023-04-18

## 2023-04-18 PROBLEM — M25.50 MULTIPLE JOINT PAIN: Status: ACTIVE | Noted: 2023-04-18

## 2023-04-18 PROBLEM — N92.6 IRREGULAR MENSTRUAL CYCLE: Status: ACTIVE | Noted: 2023-04-18

## 2023-04-18 LAB
HBA1C MFR BLD: 6 % (ref 4.8–5.6)
TSH SERPL DL<=0.05 MIU/L-ACNC: 2.19 UIU/ML (ref 0.27–4.2)

## 2023-04-18 PROCEDURE — 84443 ASSAY THYROID STIM HORMONE: CPT | Performed by: NURSE PRACTITIONER

## 2023-04-18 PROCEDURE — 36415 COLL VENOUS BLD VENIPUNCTURE: CPT | Performed by: NURSE PRACTITIONER

## 2023-04-18 PROCEDURE — 83013 H PYLORI (C-13) BREATH: CPT

## 2023-04-18 PROCEDURE — 83036 HEMOGLOBIN GLYCOSYLATED A1C: CPT | Performed by: NURSE PRACTITIONER

## 2023-04-18 NOTE — PROGRESS NOTES
"Metabolic and Bariatric Surgery Nutrition Assessment    Patient Name: Mere Ivory    YOB: 1975   Age: 47 y.o.  Sex: female  MRN: [id number]     Assessment Date: 04/18/2023    Procedure considering: sleeve    Anthropometric Measurements  Height: 68.5\"          Current weight: 357 lbs   BMI: 53.49 kg/m²    Patient Goals and Expectations                        Patient's stated weight goal: 190 lbs  Reasons for desiring weight loss: improved quality of life and health     Medical History  Pertinent diagnosis/problems: hypertension, dyslipidemia, PCOS, arthritis    Weight History  Highest adult weight: 357 lbs                              Lowest adult weight: 230 lbs  Onset of obesity: childhood  Possible triggers or life events that may have led to weight gain: always been overweight     Previous Weight Loss Efforts  Patient has tried to lose weight in the past including Weight Watchers, Nutrisystem, [place] Weight [employer], keto, Atkins, [place], calorie counting, low carbohydrate, low fat, fasting, vegan, prescription medications.   Patient has lost up to 50 lbs on diets, but was unable to maintain this long term.     Diet History  Patient is eating 3 meals and 1-2 snacks daily.     24 Hour Recall  Breakfast: egg with cheese on toast, coffee with cream and sugar   Lunch: turkey and cheese sandwich, chips or cafeteria meal   Dinner: spaghetti or fast food   Snacks: ice cream    Beverages: water, flavored water, soda     Eating out: 5 times per week   Vitamins/supplements: hair, skin and nails supplement   Diet restrictions or food allergies: NKFA    Patient identifies problem areas to be physical hunger, over consumption, eating large portions, grazing, skipping meals, sweets cravings, late night eating and inactivity.      Physical Activity  Work-related activity: light to moderate   Planned exercise: walking occasionally   Barriers to activity: joint pain, shortness of breath      Nutrition " Intervention  Pre and post op nutrition education and coaching for behavior change completed. Program materials provided. Emphasized the importance of taking in at least 70 g protein and 64 oz fluid daily. Discussed personal habits and lifestyle behaviors that may influence weight loss efforts. Self monitoring strategies such as keeping a food journal were encouraged. Patient verbalized understanding and questions were answered.  Short term goals: decrease/eliminate carbonated and high calorie beverages     Recommendations/Plan  Patient will be evaluated by multidisciplinary team before undergoing a review of their candidacy.  Additional nutrition counseling available and encouraged both pre and post op.   Patient demonstrated a good comprehension of diet requirements and commitment to make healthy changes.   Mere Ivory appears to be a suitable candidate for bariatric surgery from a nutritional standpoint.      Subha Castaneda RD  04/18/23  11:18 EDT

## 2023-04-18 NOTE — PATIENT INSTRUCTIONS
Recommended patient be sure to get at least 70 grams of protein per day by eating small, frequent meals all with high lean protein choices. Be sure to limit/cut back on daily carbohydrate intake. Discussed with the patient the recommended amount of water per day to intake. Reviewed vitamin requirements. Be sure to do routine exercise including both cardio and strength training.

## 2023-04-18 NOTE — PROGRESS NOTES
[facility] BARIATRIC SURGERY  [address] [facility] [address] [id number] [phone]  [address] [id number] [phone]  Dept: [phone]  4/18/2023      Mere Ivory.  [id number]  [id number]  1975  female      Chief Complaint of weight gain; unable to maintain weight loss    History of Present Illness:   Mere is a 47 y.o. female who presents today for evaluation, education and consultation regarding weight loss surgery. The patient is interested in the sleeve gastrectomy.      Diet History:Mere has been overweight for at least 45 years, has been 35 pounds or more overweight for at least 40 years, has been 100 pounds or more overweight for 30 or more years and started dieting at age 15.  The most weight Mere lost was 50 pounds on reduced calorie and maintained the weight loss for 6 months. Mere describes her eating habits as volume, snacker and sweets eater. Mere Ivory has tried Atkins, [employer]i, Nutrisystem, Perfect Market, Weight Watchers, Fasting, reduced calorie, exercising, medications and keto among others with success of losing up to 50 pounds, but in each instance regained the weight.    See dietician documentation for complete history.    Bariatric Surgery Evaluation: The patient is being seen for an initial visit for bariatric surgery evaluation.     Bariatric Co-morbidities:  sleep disturbances with possible sleep apnea, hypertension and polycystic ovarian disease    Patient Active Problem List   Diagnosis   • Arthritis   • Hypertension   • Morbid obesity with BMI of 50.0-59.9, adult   • Dietary counseling   • Pre-op evaluation   • Multiple joint pain   • Snoring   • Heartburn   • PCOS (polycystic ovarian syndrome)   • Borderline hyperlipidemia       Past Medical History:   Diagnosis Date   • Achilles tendon rupture, right, subsequent encounter    • Acute respiratory failure with hypoxemia  2023   • Ankle pain, right    • Arthritis    • HTN (hypertension)    • Irregular menstrual cycle 2023       Past Surgical History:   Procedure Laterality Date   • ACHILLES TENDON SURGERY Right 3/18/2022    Procedure: ACHILLES TENDON REPAIR, BONE SPUR EXCISION;  Surgeon: Sergei Russ DPM;  Location: [facility] OR;  Service: Podiatry;  Laterality: Right;   • LASIK         No Known Allergies      Current Outpatient Medications:   •  albuterol sulfate  (90 Base) MCG/ACT inhaler, Inhale 2 puffs Every 4 (Four) Hours As Needed for Wheezing., Disp: 8.5 g, Rfl: 11  •  cetirizine (zyrTEC) 10 MG tablet, Take 1 tablet by mouth Daily., Disp: 90 tablet, Rfl: 3  •  Fluticasone-Salmeterol (ADVAIR/WIXELA) 250-50 MCG/ACT DISKUS, Inhale 1 puff 2 (Two) Times a Day., Disp: 60 each, Rfl: 11  •  ipratropium-albuterol (DUO-NEB) 0.5-2.5 mg/3 ml nebulizer, Take 3 mL by nebulization Every 4 (Four) Hours As Needed for Shortness of Air or Wheezing for up to 30 days. (Patient taking differently: Take 3 mL by nebulization As Needed for Shortness of Air or Wheezing.), Disp: 360 mL, Rfl: 5  •  losartan (COZAAR) 100 MG tablet, TAKE 1 TABLET BY MOUTH EVERY DAY (Patient taking differently: Take 1 tablet by mouth Daily.), Disp: 90 tablet, Rfl: 1  •  montelukast (SINGULAIR) 10 MG tablet, Take 1 tablet by mouth Every Night., Disp: 90 tablet, Rfl: 3    Social History     Socioeconomic History   • Marital status:    • Number of children: 0   Tobacco Use   • Smoking status: Former     Packs/day: 0.00     Years: 0.00     Pack years: 0.00     Types: Cigarettes     Quit date: 2012     Years since quittin.3   • Smokeless tobacco: Never   • Tobacco comments:     QUIT 2012 WAS A SOCIAL SMOKER FOR 18 YEARS   Vaping Use   • Vaping Use: Never used   Substance and Sexual Activity   • Alcohol use: Yes     Comment: RARELY   • Drug use: Never   • Sexual activity: Defer       Family History   Problem Relation Age of Onset    • Diabetes Mother    • Hypertension Mother    • Obesity Mother    • Hypertension Father    • Diabetes Father    • Stroke Neg Hx          Review of Systems:  Review of Systems   Musculoskeletal: Positive for arthralgias.   All other systems reviewed and are negative.      Physical Exam:  Vital Signs:  Weight: (!) 162 kg (357 lb)   Body mass index is 53.49 kg/m².  Temp: 97.8 °F (36.6 °C)   Heart Rate: 96   BP: 148/93     Physical Exam  Vitals reviewed.   Constitutional:       Appearance: She is well-developed.   HENT:      Head: Normocephalic and atraumatic.   Cardiovascular:      Rate and Rhythm: Normal rate.   Pulmonary:      Effort: Pulmonary effort is normal.      Breath sounds: Normal breath sounds.   Abdominal:      General: Bowel sounds are normal. There is no distension.      Palpations: Abdomen is soft.      Tenderness: There is no abdominal tenderness.   Musculoskeletal:         General: Normal range of motion.   Skin:     General: Skin is warm and dry.   Neurological:      Mental Status: She is alert and oriented to person, place, and time.   Psychiatric:         Behavior: Behavior normal.         Thought Content: Thought content normal.         Judgment: Judgment normal.            Assessment:         Mere Ivory is a 47 y.o. year old female with medically complicated severe obesity. Weight: (!) 162 kg (357 lb), Body mass index is 53.49 kg/m². and weight related problems including sleep disturbances with possible sleep apnea, hypertension and polycystic ovarian disease.    I explained in detail, potential surgical options of interest to the patient including the RNY gastric bypass, sleeve gastrectomy, and gastric band while considering the patient's medical history. At this time, the patient expressed interest in the sleeve gastrectomy.  All of those procedures can be performed laparoscopically but there is a chance to convert to open if any technical challenges or complications do occur.   Bariatric surgery is not cosmetic surgery but rather a tool to help a patient make a life-long commitment lifestyle changes including diet, exercise, behavior changes, and taking supplemental vitamins and minerals. The risks, benefits, alternatives, and potential complications of all of the procedures were explained in detail including but not limited to failure to lose weight or gain weight and change in body image, metabolic complications. The patient was informed that surgery is a tool and requires lifestyle change including dietary modification to be successful. The patient was made aware of the need for vitamin supplementation after surgery due to the risk of deficiencies including but not limited to calcium, thiamine, vitamin B12, folate, iron, and anemia.    The patient was advised to start a high protein, low fat and low carbohydrate diet. The patient was given individualized information by our dietician along with handouts. The patient was encouraged to start routine exercise including but not limited to 150 minutes per week. The patient received a resistance band along with a handout of exercises.     The patient was given information regarding the CRISTAL educational video. CRISTAL is an internet based educational video which explains the surgical procedure and answers basic questions regarding the procedure. The patient was provided with instructions and a password to watch the video.    Due to the patient's BMI, history and co-morbidities they are at a high risk for surgery and will obtain the following:  -The patient has been advised that a letter of medical support and a history and physical must be obtained from her primary care physician. The patient was given a copy of a sample form, that will suffice as their letter to take to their primary are provider.  -A referral for pre-operative psychological evaluation was ordered for the patient to evaluate candidacy as well as provide mental health support,  should it be warranted before or after surgery.  -Pre-operative testing will be ordered to include: TSH and HgbA1C will be drawn, UGI. Previous results of testing were reviewed including CBC, CMP, CXR, CT Chest, EKG, ECHO.     Mere Ivory was screened for sleep apnea- had an evaluation for sleep study yesterday and is going for sleep study. The risks, as they relate to chronic hypercapnia r/t untreated BRANDT were discussed with the patient and they verbalized understanding.     The patient understands the surgical procedures and the different surgical options that are available.  She understands the lifestyle changes that would be required after surgery and has agreed to participate in a pre-operative and postoperative weight management program.  She also expressed understanding of possible risks, had several questions answered and desires to proceed.    I think she is a good candidate for this surgery, and is interested in a sleeve gastrectomy.    Encounter Diagnoses   Name Primary?   • Morbid obesity with BMI of 50.0-59.9, adult Yes   • Primary hypertension    • Heartburn    • PCOS (polycystic ovarian syndrome)    • Snoring    • Borderline hyperlipidemia    • Pre-op evaluation        Plan:    The consultation plan was reviewed with the patient.  Patient will have evaluations and follow up with bariatric dieticians and a psychologist before undergoing a multidisciplinary review of her candidacy.  We also discussed the weight loss requirement and rationale, as well as other program requirements to ensure the safest approach to surgery. We spent time discussing different surgical procedures and plan of care throughout their lifespan to ensure long term success in achieving and maintaining a healthier weight. Patient will proceed with preoperative lab work, radiology, and endoscopy after obtaining agreed upon clearances and letter of support from their primary care provider.     As this patient is a female of  childbearing age, she was counseled regarding conception and pregnancy after surgery. Patient was advised that conception and pregnancy in the first 18 months post surgery is not advised due to increased metabolic demands required during pregnancy as well as increased risk of nutrient and vitamin deficiencies which could jeopardize fetal development and birth weight.     Total encounter exceeded 60 minutes including reviewing their chart/outside medical records/previous visits, face to face time obtaining medical history and physical, reviewing surgical options and answering any questions, discussing pre-operative plan and requirements along with care coordination.         Vicenta Caballero, APRN  4/18/2023

## 2023-04-20 LAB — UREA BREATH TEST QL: POSITIVE

## 2023-04-21 ENCOUNTER — PATIENT ROUNDING (BHMG ONLY) (OUTPATIENT)
Dept: BARIATRICS/WEIGHT MGMT | Facility: CLINIC | Age: 48
End: 2023-04-21
Payer: COMMERCIAL

## 2023-04-21 DIAGNOSIS — A04.8 BACTERIAL INFECTION DUE TO HELICOBACTER PYLORI: Primary | ICD-10-CM

## 2023-04-21 NOTE — PROGRESS NOTES
Good afternoon,    My name is Elicia Morris, I am the Practice Manager for [facility] Bariatric Surgery.      I want to officially welcome you to our practice and ask about your recent visit.      If you could tell me about your recent visit with us. What things went well?      We're always looking for ways to make our patients experiences even better. Do you have recommendations on ways we may improve?      I appreciate you taking the time to answer a few questions today.      Thank you, and have a great day!        Message was sent to the patient via [employer] for PATIENT ROUNDING for [facility] [place].

## 2023-05-24 ENCOUNTER — HOSPITAL ENCOUNTER (OUTPATIENT)
Dept: SLEEP MEDICINE | Facility: HOSPITAL | Age: 48
End: 2023-05-24
Payer: COMMERCIAL

## 2023-05-24 DIAGNOSIS — R06.83 SNORING: ICD-10-CM

## 2023-05-24 DIAGNOSIS — E66.01 CLASS 3 SEVERE OBESITY WITH BODY MASS INDEX (BMI) OF 50.0 TO 59.9 IN ADULT, UNSPECIFIED OBESITY TYPE, UNSPECIFIED WHETHER SERIOUS COMORBIDITY PRESENT: ICD-10-CM

## 2023-05-24 DIAGNOSIS — R09.02 HYPOXEMIA: ICD-10-CM

## 2023-05-24 DIAGNOSIS — R06.81 WITNESSED EPISODE OF APNEA: ICD-10-CM

## 2023-05-24 DIAGNOSIS — G47.8 NON-RESTORATIVE SLEEP: ICD-10-CM

## 2023-05-24 PROCEDURE — 95811 POLYSOM 6/>YRS CPAP 4/> PARM: CPT | Performed by: INTERNAL MEDICINE

## 2023-05-24 PROCEDURE — 95811 POLYSOM 6/>YRS CPAP 4/> PARM: CPT

## 2023-05-25 ENCOUNTER — OFFICE VISIT (OUTPATIENT)
Dept: BARIATRICS/WEIGHT MGMT | Facility: CLINIC | Age: 48
End: 2023-05-25
Payer: COMMERCIAL

## 2023-05-25 VITALS
DIASTOLIC BLOOD PRESSURE: 80 MMHG | HEIGHT: 69 IN | HEART RATE: 77 BPM | BODY MASS INDEX: 43.4 KG/M2 | WEIGHT: 293 LBS | SYSTOLIC BLOOD PRESSURE: 122 MMHG | TEMPERATURE: 98.2 F

## 2023-05-25 DIAGNOSIS — A04.8 POSITIVE H. PYLORI TEST: ICD-10-CM

## 2023-05-25 DIAGNOSIS — Z71.3 DIETARY COUNSELING: ICD-10-CM

## 2023-05-25 DIAGNOSIS — I10 PRIMARY HYPERTENSION: ICD-10-CM

## 2023-05-25 DIAGNOSIS — E66.01 MORBID OBESITY WITH BMI OF 50.0-59.9, ADULT: Primary | ICD-10-CM

## 2023-05-25 PROCEDURE — 99214 OFFICE O/P EST MOD 30 MIN: CPT | Performed by: NURSE PRACTITIONER

## 2023-05-25 NOTE — PROGRESS NOTES
[facility] BARIATRIC SURGERY  [address] [address] [phone]  [address] [address] [phone]  Dept: [phone]  5/25/2023      Mere Ivory.  [id number]  [id number]  1975  female      Chief Complaint   Patient presents with   • Nutrition Counseling     diet       The patient is here for month 2/3 of their pre-operative physician supervised diet. Today's weight is (!) 157 kg (347 lb) pounds and had a loss of 10 lbs. The patient states that she is following the recommendations given by our office and dietician including a high lean protein, low carb and low fat diet. We recommended adequate fruits and vegetable intake along with limited portion sizes. Patient is working on eliminating fast foods, fried foods, sweets and soda. Mere Ivory has been increasing her daily water intake. She has been exercising: walking more regularly.    Patient states they have made positive changes including trying to follow intermittent fasting, took sugar out of her coffee, cut back on sweets, doing well with increasing protein intake, drinking plenty of water, eating plenty of vegetables,   The patient admits to be struggling with portion size but trying to eat smaller amounts already, eating out regularly but has worked on eating at home more    Review of Systems   All other systems reviewed and are negative.    Vitals:    05/25/23 1333   BP: 122/80   Pulse: 77   Temp: 98.2 °F (36.8 °C)     Patient Active Problem List   Diagnosis   • Arthritis   • Hypertension   • Morbid obesity with BMI of 50.0-59.9, adult   • Dietary counseling   • Pre-op evaluation   • Multiple joint pain   • Snoring   • Heartburn   • PCOS (polycystic ovarian syndrome)   • Borderline hyperlipidemia     Body mass index is 51.99 kg/m².    The following portions of the patient's history were reviewed and updated as appropriate: active  problem list, medication list, allergies, social history, notes from last encounter, lab results    Physical Exam  Vitals reviewed.   Constitutional:       Appearance: Normal appearance. She is well-developed. She is obese.   HENT:      Head: Normocephalic and atraumatic.   Cardiovascular:      Rate and Rhythm: Normal rate.   Pulmonary:      Effort: Pulmonary effort is normal.   Abdominal:      Palpations: Abdomen is soft.   Musculoskeletal:         General: Normal range of motion.   Skin:     General: Skin is warm and dry.   Neurological:      Mental Status: She is alert and oriented to person, place, and time.   Psychiatric:         Behavior: Behavior normal.         Thought Content: Thought content normal.         Judgment: Judgment normal.         Discussion/Plan:  Obesity/Morbid Obesity: Currently the patient's weight is decreasing. There are no medications prescribed.Treatment plan includes prescribed diet, prescribed exercise regimen and behavior modification.    I reviewed the appropriate dietary choices with the patient and encouraged the necessary changes. Recommended at least 70 grams of protein per day, around 35 grams of fats and less than 100 grams of carbohydrates. Reviewed calorie intake if patient wanted to calorie count and/or had BMR. Instructed patient to drink half of body weight in ounces per day and exercise a minimum of 150 minutes per week including both cardio and strength training. Discussed the option of keeping a food journal which will help patient become more aware of the nutritional value of foods so they are more prepared after surgery.    The patient was given written materials from our office for education.   I answered all of the patients questions regarding dietary changes, exercise or surgical options.  The patient will follow up in 1 month. The total time spent face to face was approximately 30 minutes.     Encounter Diagnoses   Name Primary?   • Morbid obesity with BMI of  50.0-59.9, adult Yes   • Primary hypertension    • Dietary counseling

## 2023-06-01 DIAGNOSIS — G47.33 OSA (OBSTRUCTIVE SLEEP APNEA): Primary | ICD-10-CM

## 2023-06-01 DIAGNOSIS — R06.83 SNORING: ICD-10-CM

## 2023-06-02 ENCOUNTER — TELEPHONE (OUTPATIENT)
Dept: SLEEP MEDICINE | Facility: HOSPITAL | Age: 48
End: 2023-06-02

## 2023-06-02 ENCOUNTER — HOSPITAL ENCOUNTER (OUTPATIENT)
Dept: GENERAL RADIOLOGY | Facility: HOSPITAL | Age: 48
Discharge: HOME | End: 2023-06-02

## 2023-06-02 DIAGNOSIS — R12 HEARTBURN: ICD-10-CM

## 2023-06-02 DIAGNOSIS — Z01.818 PRE-OP EVALUATION: ICD-10-CM

## 2023-06-02 DIAGNOSIS — E66.01 MORBID OBESITY WITH BMI OF 50.0-59.9, ADULT: ICD-10-CM

## 2023-06-02 PROCEDURE — 74240 X-RAY XM UPR GI TRC 1CNTRST: CPT

## 2023-06-08 ENCOUNTER — LAB (OUTPATIENT)
Dept: LAB | Facility: HOSPITAL | Age: 48
End: 2023-06-08
Payer: COMMERCIAL

## 2023-06-08 DIAGNOSIS — A04.8 POSITIVE H. PYLORI TEST: ICD-10-CM

## 2023-06-08 LAB — UREA BREATH TEST QL: NEGATIVE

## 2023-06-08 PROCEDURE — 83013 H PYLORI (C-13) BREATH: CPT

## 2023-06-12 ENCOUNTER — TELEPHONE (OUTPATIENT)
Dept: BARIATRICS/WEIGHT MGMT | Facility: CLINIC | Age: 48
End: 2023-06-12
Payer: COMMERCIAL

## 2023-06-12 NOTE — TELEPHONE ENCOUNTER
----- Message from JAIME Gaviria sent at 6/8/2023  2:46 PM EDT -----  Per JADA rodrigez unless patient would like an EGD for further evaluation

## 2023-06-13 ENCOUNTER — OFFICE VISIT (OUTPATIENT)
Dept: PULMONOLOGY | Facility: CLINIC | Age: 48
End: 2023-06-13
Payer: COMMERCIAL

## 2023-06-13 VITALS
RESPIRATION RATE: 18 BRPM | TEMPERATURE: 98.2 F | HEART RATE: 73 BPM | DIASTOLIC BLOOD PRESSURE: 67 MMHG | HEIGHT: 69 IN | BODY MASS INDEX: 43.4 KG/M2 | SYSTOLIC BLOOD PRESSURE: 120 MMHG | OXYGEN SATURATION: 96 % | WEIGHT: 293 LBS

## 2023-06-13 DIAGNOSIS — Z99.89 OSA ON CPAP: ICD-10-CM

## 2023-06-13 DIAGNOSIS — E66.01 MORBID OBESITY WITH BMI OF 50.0-59.9, ADULT: Primary | ICD-10-CM

## 2023-06-13 DIAGNOSIS — J45.20 INTERMITTENT ASTHMA, UNSPECIFIED ASTHMA SEVERITY, UNSPECIFIED WHETHER COMPLICATED: ICD-10-CM

## 2023-06-13 DIAGNOSIS — G47.33 OSA ON CPAP: ICD-10-CM

## 2023-06-13 PROBLEM — J45.909 ASTHMA: Status: ACTIVE | Noted: 2023-06-13

## 2023-06-13 NOTE — PROGRESS NOTES
Primary Care Provider  Shubham Alejandro APRN   Referring Provider  No ref. provider found      Patient Complaint  Follow-up (6 Weeks)      Subjective          Mere Ivory presents to [facility] PULMONARY & CRITICAL CARE MEDICINE      History of Presenting Illness  Mere Ivory is a 47 y.o. female with history of asthma, BRANDT on CPAP here for follow-up.    Patient is doing well.  Her asthma is well controlled on Advair twice daily and albuterol which she rarely uses.  She denies cough, sputum, wheezing.  Patient is a never smoker.  Patient is using her CPAP at night which helps her symptoms of fatigue during the day.  Patient is being evaluated for bariatric surgery which may happen sometime next month.  Patient is also on Zyrtec and Singulair.  She asked if she can wean off Zyrtec.  I informed her that this is appropriate if her symptoms are well controlled on Advair alone.  I have personally reviewed the review of systems, past family, social, medical and surgical histories; and agree with their findings.    Review of Systems  Constitutional:  No fever. No chills. No weakness.  ENT:  No sore throat, URI symptoms.   Cardiovascular:  No chest pain. No palpitations. No lower extremity edema.  Respiratory:  No shortness of breath, cough, pleuritic chest pain. No hemoptysis. No dyspnea.   GI:  Normal appetite. No nausea, vomiting, diarrhea. No melena.  Musculoskeletal:  No arthralgias or myalgias.  Neurologic:  No weakness    Family History   Problem Relation Age of Onset    Diabetes Mother     Hypertension Mother     Obesity Mother     Hypertension Father     Diabetes Father     Stroke Neg Hx         Social History     Socioeconomic History    Marital status:     Number of children: 0   Tobacco Use    Smoking status: Former     Packs/day: 0.25     Years: 5.00     Pack years: 1.25     Types: Cigarettes     Start date: 4/1/2007     Quit date: 1/1/2012     Years since  quittin.4    Smokeless tobacco: Never    Tobacco comments:     QUIT 2012 WAS A SOCIAL SMOKER FOR 18 YEARS   Vaping Use    Vaping Use: Never used   Substance and Sexual Activity    Alcohol use: Not Currently     Comment: RARELY    Drug use: Never    Sexual activity: Yes     Partners: Male     Birth control/protection: None        Past Medical History:   Diagnosis Date    Achilles tendon rupture, right, subsequent encounter     Acute respiratory failure with hypoxemia 2023    Ankle pain, right     Arthritis     HTN (hypertension)     Irregular menstrual cycle 2023        Immunization History   Administered Date(s) Administered    COVID-19 (PFIZER) Purple Cap Monovalent 03/10/2021, 2021    Flu Vaccine Quad PF >36MO 10/13/2020    FluLaval/Fluzone >6mos 10/13/2020, 2021, 2022    Influenza, Unspecified 10/01/2019    PPD Test 2019, 2021, 2022    Tdap 2019       No Known Allergies       Current Outpatient Medications:     albuterol sulfate  (90 Base) MCG/ACT inhaler, Inhale 2 puffs Every 4 (Four) Hours As Needed for Wheezing., Disp: 8.5 g, Rfl: 11    cetirizine (zyrTEC) 10 MG tablet, Take 1 tablet by mouth Daily., Disp: 90 tablet, Rfl: 3    Fluticasone-Salmeterol (ADVAIR/WIXELA) 250-50 MCG/ACT DISKUS, Inhale 1 puff 2 (Two) Times a Day., Disp: 60 each, Rfl: 11    losartan (COZAAR) 100 MG tablet, TAKE 1 TABLET BY MOUTH EVERY DAY (Patient taking differently: Take 1 tablet by mouth Daily.), Disp: 90 tablet, Rfl: 1    montelukast (SINGULAIR) 10 MG tablet, Take 1 tablet by mouth Every Night., Disp: 90 tablet, Rfl: 3    ipratropium-albuterol (DUO-NEB) 0.5-2.5 mg/3 ml nebulizer, Take 3 mL by nebulization Every 4 (Four) Hours As Needed for Shortness of Air or Wheezing for up to 30 days. (Patient taking differently: Take 3 mL by nebulization As Needed for Shortness of Air or Wheezing.), Disp: 360 mL, Rfl: 5     Objective     Vital Signs:   /67 (BP Location: Left  "arm, Patient Position: Sitting, Cuff Size: Large Adult)   Pulse 73   Temp 98.2 °F (36.8 °C) (Temporal)   Resp 18   Ht 174 cm (68.5\")   Wt (!) 156 kg (343 lb)   SpO2 96% Comment: Room air  BMI 51.39 kg/m²     Physical Exam  Vital Signs Reviewed   WDWN, Alert, NAD.    HEENT:  EOMI.  nares clear  Neck:  Supple, no JVD, no thyromegaly  Chest:  clear to auscultation bilaterally, no wheezes, crackles; no work of breathing noted  CV: RRR, no M/G/R, pulses 2+, equal.  Abd:  Soft, NT, ND, +BS, obese  EXT:  no clubbing, no cyanosis, no edema  Neuro:  A&Ox3, CN grossly intact, no focal deficits.  Skin: No rashes or lesions noted       Result Review :   I have personally reviewed patient's labs and images.              Patient Active Problem List   Diagnosis    Arthritis    Hypertension    Morbid obesity with BMI of 50.0-59.9, adult    Dietary counseling    Pre-op evaluation    Multiple joint pain    Snoring    Heartburn    PCOS (polycystic ovarian syndrome)    Borderline hyperlipidemia    H. pylori infection       Impression and Plan    Mild intermittent asthma  BRANDT on CPAP  GERD  Obesity, BMI 51.4    -Asthma well-controlled on Advair twice daily and albuterol as needed  -Continue CPAP for BRANDT  -Encouraged weight loss.  Patient is being evaluated for bariatric surgery that may happen next month.  -Can wean off Zyrtec  -Continue Singulair  -Follow-up in 6 months or earlier as needed    Smoking status: Reviewed  Vaccination status: Patient is up-to-date with her COVID and influenza vaccinations at this time  Medications personally reviewed    Follow Up   No follow-ups on file.  Patient was given instructions and counseling regarding her condition or for health maintenance advice. Please see specific information pulled into the AVS if appropriate.        "

## 2023-06-15 ENCOUNTER — CONSULT (OUTPATIENT)
Dept: BARIATRICS/WEIGHT MGMT | Facility: CLINIC | Age: 48
End: 2023-06-15
Payer: COMMERCIAL

## 2023-06-15 ENCOUNTER — PREP FOR SURGERY (OUTPATIENT)
Dept: BARIATRICS/WEIGHT MGMT | Facility: CLINIC | Age: 48
End: 2023-06-15

## 2023-06-15 VITALS
DIASTOLIC BLOOD PRESSURE: 89 MMHG | TEMPERATURE: 97.8 F | SYSTOLIC BLOOD PRESSURE: 144 MMHG | HEART RATE: 80 BPM | WEIGHT: 293 LBS | HEIGHT: 69 IN | BODY MASS INDEX: 43.4 KG/M2

## 2023-06-15 DIAGNOSIS — M25.50 MULTIPLE JOINT PAIN: ICD-10-CM

## 2023-06-15 DIAGNOSIS — E66.01 CLASS 3 SEVERE OBESITY DUE TO EXCESS CALORIES WITH SERIOUS COMORBIDITY AND BODY MASS INDEX (BMI) OF 50.0 TO 59.9 IN ADULT: Primary | ICD-10-CM

## 2023-06-15 DIAGNOSIS — Z71.3 DIETARY COUNSELING: ICD-10-CM

## 2023-06-15 DIAGNOSIS — G47.33 OSA ON CPAP: ICD-10-CM

## 2023-06-15 DIAGNOSIS — I10 PRIMARY HYPERTENSION: ICD-10-CM

## 2023-06-15 DIAGNOSIS — Z99.89 OSA ON CPAP: ICD-10-CM

## 2023-06-15 DIAGNOSIS — E28.2 PCOS (POLYCYSTIC OVARIAN SYNDROME): ICD-10-CM

## 2023-06-15 PROBLEM — A04.8 H. PYLORI INFECTION: Status: RESOLVED | Noted: 2023-05-25 | Resolved: 2023-06-15

## 2023-06-15 PROBLEM — Z01.818 PRE-OP EVALUATION: Status: RESOLVED | Noted: 2023-04-18 | Resolved: 2023-06-15

## 2023-06-15 PROBLEM — E66.813 CLASS 3 SEVERE OBESITY DUE TO EXCESS CALORIES WITH SERIOUS COMORBIDITY AND BODY MASS INDEX (BMI) OF 50.0 TO 59.9 IN ADULT: Status: ACTIVE | Noted: 2023-06-15

## 2023-06-15 RX ORDER — CHLORHEXIDINE GLUCONATE 0.12 MG/ML
15 RINSE ORAL SEE ADMIN INSTRUCTIONS
OUTPATIENT
Start: 2023-06-15

## 2023-06-15 RX ORDER — SODIUM CHLORIDE, SODIUM LACTATE, POTASSIUM CHLORIDE, CALCIUM CHLORIDE 600; 310; 30; 20 MG/100ML; MG/100ML; MG/100ML; MG/100ML
100 INJECTION, SOLUTION INTRAVENOUS CONTINUOUS
OUTPATIENT
Start: 2023-06-15

## 2023-06-15 RX ORDER — PROMETHAZINE HYDROCHLORIDE 12.5 MG/1
12.5 TABLET ORAL ONCE AS NEEDED
OUTPATIENT
Start: 2023-06-15

## 2023-06-15 RX ORDER — SODIUM CHLORIDE 0.9 % (FLUSH) 0.9 %
3 SYRINGE (ML) INJECTION EVERY 12 HOURS SCHEDULED
OUTPATIENT
Start: 2023-06-15

## 2023-06-15 RX ORDER — SODIUM CHLORIDE 9 MG/ML
40 INJECTION, SOLUTION INTRAVENOUS AS NEEDED
OUTPATIENT
Start: 2023-06-15

## 2023-06-15 RX ORDER — PANTOPRAZOLE SODIUM 40 MG/10ML
40 INJECTION, POWDER, LYOPHILIZED, FOR SOLUTION INTRAVENOUS ONCE
OUTPATIENT
Start: 2023-06-15 | End: 2023-06-15

## 2023-06-15 RX ORDER — GABAPENTIN 100 MG/1
300 CAPSULE ORAL ONCE
OUTPATIENT
Start: 2023-06-15 | End: 2023-06-15

## 2023-06-15 RX ORDER — SODIUM CHLORIDE 0.9 % (FLUSH) 0.9 %
3-10 SYRINGE (ML) INJECTION AS NEEDED
OUTPATIENT
Start: 2023-06-15

## 2023-06-15 RX ORDER — SCOLOPAMINE TRANSDERMAL SYSTEM 1 MG/1
1 PATCH, EXTENDED RELEASE TRANSDERMAL CONTINUOUS
OUTPATIENT
Start: 2023-06-15 | End: 2023-06-18

## 2023-06-15 RX ORDER — URSODIOL 300 MG/1
300 CAPSULE ORAL 2 TIMES DAILY
Qty: 60 CAPSULE | Refills: 5 | Status: SHIPPED | OUTPATIENT
Start: 2023-06-15

## 2023-06-15 RX ORDER — PROMETHAZINE HYDROCHLORIDE 12.5 MG/1
25 SUPPOSITORY RECTAL ONCE AS NEEDED
OUTPATIENT
Start: 2023-06-15

## 2023-06-15 RX ORDER — ENOXAPARIN SODIUM 100 MG/ML
40 INJECTION SUBCUTANEOUS EVERY 12 HOURS SCHEDULED
Qty: 11.2 ML | Refills: 0 | Status: SHIPPED | OUTPATIENT
Start: 2023-06-15 | End: 2023-06-29

## 2023-06-15 NOTE — H&P
Bariatric Consult:  Referred by Shubham Alejandro APRN Cherie Leann Ivory is here today for consult on Consult (Intake/)      History of Present Illness:     Mere Ivory is a 47 y.o. female with morbid obesity with co-morbidities including sleep apnea, hypertension, osteoarthritis, and knee pain who presents for surgical consultation for the above procedure. Mere has completed the initial intake visit and has been examined by our nurse practitioner, dietician, psychologist and underwent the extensive educational teaching process under the guidance of our bariatric coordinator and myself. Mere also has seen or if has not yet will see the educational video CRISTAL on the surgical procedure if available. Mere attended today more educational teaching from our bariatric coordinator and myself. Mere has had an extensive medical workup including a visit with their primary care physician, EKG, chest radiograph, blood work, EGD or UGI and possibly further testing. These have been reviewed by me and discussed with the patient. Mere is now ready to proceed with surgery. Mere presently denies nausea, vomiting, fever, chills, chest pain, shortness of air, melena, hematochezia, hemetemesis, dysuria, frequency, hematuria.       Past Medical History:   Diagnosis Date    Achilles tendon rupture, right, subsequent encounter     Acute respiratory failure with hypoxemia 02/28/2023    Ankle pain, right     Arthritis     HTN (hypertension)     Irregular menstrual cycle 04/18/2023    BRANDT on CPAP 6/13/2023       Encounter Diagnoses   Name Primary?    Class 3 severe obesity due to excess calories with serious comorbidity and body mass index (BMI) of 50.0 to 59.9 in adult Yes    PCOS (polycystic ovarian syndrome)     Dietary counseling     Primary hypertension     BRANDT on CPAP     Multiple joint pain        Past Surgical History:   Procedure Laterality Date    ACHILLES TENDON SURGERY Right 03/18/2022    Procedure:  ACHILLES TENDON REPAIR, BONE SPUR EXCISION;  Surgeon: Sergei Russ DPM;  Location: [facility] OR;  Service: Podiatry;  Laterality: Right;    LASIK  2011       Patient Active Problem List   Diagnosis    Arthritis    Hypertension    Dietary counseling    Multiple joint pain    Snoring    Heartburn    PCOS (polycystic ovarian syndrome)    Borderline hyperlipidemia    Asthma    BRANDT on CPAP    Class 3 severe obesity due to excess calories with serious comorbidity and body mass index (BMI) of 50.0 to 59.9 in adult       No Known Allergies      Current Outpatient Medications:     albuterol sulfate  (90 Base) MCG/ACT inhaler, Inhale 2 puffs Every 4 (Four) Hours As Needed for Wheezing., Disp: 8.5 g, Rfl: 11    cetirizine (zyrTEC) 10 MG tablet, Take 1 tablet by mouth Daily., Disp: 90 tablet, Rfl: 3    Enoxaparin Sodium (LOVENOX) 40 MG/0.4ML solution prefilled syringe syringe, Inject 0.4 mL under the skin into the appropriate area as directed Every 12 (Twelve) Hours for 14 days. Start after surgery unless instructed otherwise, Disp: 11.2 mL, Rfl: 0    Fluticasone-Salmeterol (ADVAIR/WIXELA) 250-50 MCG/ACT DISKUS, Inhale 1 puff 2 (Two) Times a Day., Disp: 60 each, Rfl: 11    folic acid-vit B6-vit B12 (FOLBEE) 2.5-25-1 MG tablet tablet, Take 1 tablet by mouth Daily., Disp: 40 tablet, Rfl: 0    ipratropium-albuterol (DUO-NEB) 0.5-2.5 mg/3 ml nebulizer, Take 3 mL by nebulization Every 4 (Four) Hours As Needed for Shortness of Air or Wheezing for up to 30 days. (Patient taking differently: Take 3 mL by nebulization As Needed for Shortness of Air or Wheezing.), Disp: 360 mL, Rfl: 5    losartan (COZAAR) 100 MG tablet, TAKE 1 TABLET BY MOUTH EVERY DAY (Patient taking differently: Take 1 tablet by mouth Daily.), Disp: 90 tablet, Rfl: 1    montelukast (SINGULAIR) 10 MG tablet, Take 1 tablet by mouth Every Night., Disp: 90 tablet, Rfl: 3    ursodiol (Actigall) 300 MG capsule, Take 1 capsule by mouth 2 (Two) Times a  Day., Disp: 60 capsule, Rfl: 5    Social History     Socioeconomic History    Marital status:     Number of children: 0   Tobacco Use    Smoking status: Former     Packs/day: 0.25     Years: 5.00     Pack years: 1.25     Types: Cigarettes     Start date: 2007     Quit date: 2012     Years since quittin.4    Smokeless tobacco: Never    Tobacco comments:     QUIT 2012 WAS A SOCIAL SMOKER FOR 18 YEARS   Vaping Use    Vaping Use: Never used   Substance and Sexual Activity    Alcohol use: Not Currently     Comment: RARELY    Drug use: Never    Sexual activity: Yes     Partners: Male     Birth control/protection: None       Family History   Problem Relation Age of Onset    Diabetes Mother     Hypertension Mother     Obesity Mother     Hypertension Father     Diabetes Father     Stroke Neg Hx        Review of Systems:  Review of Systems   Constitutional:  Positive for fatigue.   Musculoskeletal:  Positive for arthralgias.   All other systems reviewed and are negative.      Physical Exam:    Vital Signs:  Weight: (!) 156 kg (343 lb)   Body mass index is 51.39 kg/m².  Temp: 97.8 °F (36.6 °C)   Heart Rate: 80   BP: 144/89       Physical Exam  Vitals reviewed.   HENT:      Head: Normocephalic and atraumatic.      Mouth/Throat:      Mouth: Mucous membranes are moist.      Pharynx: Oropharynx is clear.   Eyes:      General: No scleral icterus.     Extraocular Movements: Extraocular movements intact.      Conjunctiva/sclera: Conjunctivae normal.      Pupils: Pupils are equal, round, and reactive to light.   Neck:      Thyroid: No thyromegaly.   Cardiovascular:      Rate and Rhythm: Normal rate.   Pulmonary:      Effort: Pulmonary effort is normal. No respiratory distress.      Breath sounds: Normal breath sounds. No stridor. No wheezing or rhonchi.   Abdominal:      General: Bowel sounds are normal.      Palpations: Abdomen is soft.      Tenderness: There is no abdominal tenderness. There is no right CVA  tenderness, left CVA tenderness, guarding or rebound.      Hernia: No hernia is present.   Musculoskeletal:         General: Normal range of motion.      Cervical back: Normal range of motion and neck supple.   Lymphadenopathy:      Cervical: No cervical adenopathy.   Skin:     General: Skin is warm and dry.      Findings: No erythema.   Neurological:      Mental Status: She is alert and oriented to person, place, and time.   Psychiatric:         Mood and Affect: Mood normal.         Behavior: Behavior normal.         Thought Content: Thought content normal.         Judgment: Judgment normal.         Assessment:    Mere Ivory is a 47 y.o. year old female with medically complicated severe obesity with a BMI of Body mass index is 51.39 kg/m². and multiple co-morbidities listed in the encounter diagnosis.    I think she is an appropriate candidate for this surgery, and is ready to proceed.      Plan/Discussion/Summary:  No hiatal hernia per upper GI.  No PPI.  H. pylori positive and treated and retested negative    The patient has returned to the office for a surgical consultation and has requested to proceed with gastric sleeve.  I have had the opportunity to obtain a history, examine the patient and review the patient's chart. The procedure could be done laparoscopically and or robotically.    The patient understands that surgery is a tool and that weight loss is not guaranteed but only seen in the context of appropriate use, regular follow up, exercise and making appropriate food choices.     I personally discussed the potential complications of the laparoscopic gastric sleeve with this patient.  The patient is well aware of potential complications of the surgery that include but not limited to bleeding, infections, deep vein thrombosis, pulmonary embolism, pulmonary complications such as pneumonia, cardiac event, hernias, small bowel obstruction, damage to the spleen or other organs, bowel injury,  disfiguring scars, failure to lose weight, need for additional surgery, conversion to an open procedure and death.  The patient is also aware of complications which apply in particular to the gastric sleeve and can include but not limited to the leakage of gastric contents at the staple line, the development of an intra-abdominal abscess, gastroesophageal reflux disease, Kim's esophagus, ulcers, vitamin/mineral deficiencies, strictures, and the possibility of converting this procedure to a Rosalia-en-Y gastric bypass. The patient also understands the possibility of requiring an acid reducer medication for the rest of their life.    The risks, benefits, potential complications and alternative therapies were discussed at great length as outlined in our extensive consent forms, online consent and educational teaching processes.    The patient has confirmed the participation in the programs extensive educational activities.    All questions and concerns were answered to patient's satisfaction.  The patient now wishes to proceed with surgery.     The patient has agreed to a postoperative course of anitcoagulant therapy.      I instructed patient that the surgery could be laparoscopically and/or robotically.    I instructed patient to start on a H2 blocker or proton pump inhibitor if not already on one of these medications.    I explained in detail the procedures that are in the consent.  All of these procedures have a chance to convert to open if any technical challenges or complications do occur.  Bariatric surgery is not cosmetic surgery but rather a tool to help a patient make a life-long commitment lifestyle change including diet, exercise, behavior changes, and taking supplemental vitamins and minerals.    Problems after surgery may require more operations to correct them.    The risks, benefits, alternatives, and potential complications of all of the procedures were explained in detail including, but not limited  to death, anesthesia and medication adverse effect, deep venous thrombosis, pulmonary embolism, trocar site/incisional hernia, wound infection, abdominal infection, bleeding, failure to lose weight, gain weight, a change in body image, metabolic complications with vitamin deficiences and anemia.    Weight loss expectations were discussed with the patient in detail. The weight loss operations most commonly performed are the sleeve gastrectomy and the Rosalia-en-Y gastric bypass. These operations result in weight losses up to approximately 25-35% of initial body weight 12 to 24 months after surgery with the gastric bypass usually the higher percent of weight loss but depends on patient using the tool.    For the gastric bypass and loop duodenal switch (ANDREA-S) the risks include but not limited to the following early complications:  Anastomotic leak/peritonitis, Rosalia/Alimentary/biliopancreatic limb obstruction, severe & minor wound infection/seroma, and nausea/vomiting.  Late complications can include but are not limited to malnutrition, vitamin deficiencies, frequent loose stools,  stomal stenosis, marginal ulcer, bowel obstruction, intussusception, internal, and incisional hernia.    Regarding the gastric sleeve, there is higher risk of dysphagia and reflux leading to possible Kim's esophagus compared to a gastric bypass, as well as risk of internal visceral/organ injury, splenectomy, bleeding, infection, leak (which could require further intervention possible conversion to Rosalia-en-Y gastric bypass), stenosis and possibility of regaining weight.    Mere was counseled regarding diagnostic results, instructions for management, risk factor reductions, prognosis, patient and family education, impressions, risks and benefits of treatment options and importance of compliance with treatment. Total time of the encounter was over 45 minutes counseling the patient regarding the procedure as above and reviewing as well as  ordering labs, medications and the procedure.  The chart was also reviewed prior to seeing the patient reviewing previous testing, studies and labs.    Mere understands the surgical procedures and the different surgical options that are available.  She understands the lifestyle changes that are required after surgery and has agreed to follow the guidelines outlined in the weight management program.  She also expressed understanding of the risks involved and had all of female questions answered and desires to proceed.      Darren Wolfe MD  6/15/2023

## 2023-06-15 NOTE — PATIENT INSTRUCTIONS
Bariatric Manual    You were provided a manual specific to the procedure that you have chosen.  Please refer to that with any questions or call the office at [phone]

## 2023-07-17 ENCOUNTER — HOSPITAL ENCOUNTER (INPATIENT)
Facility: HOSPITAL | Age: 48
LOS: 1 days | Discharge: HOME | DRG: 621 | End: 2023-07-18
Attending: SURGERY | Admitting: SURGERY
Payer: COMMERCIAL

## 2023-07-17 DIAGNOSIS — Z98.84 S/P LAPAROSCOPIC SLEEVE GASTRECTOMY: Primary | ICD-10-CM

## 2023-07-17 DIAGNOSIS — I10 HYPERTENSION, UNSPECIFIED TYPE: ICD-10-CM

## 2023-07-17 DIAGNOSIS — E66.01 CLASS 3 SEVERE OBESITY DUE TO EXCESS CALORIES WITH SERIOUS COMORBIDITY AND BODY MASS INDEX (BMI) OF 50.0 TO 59.9 IN ADULT: ICD-10-CM

## 2023-07-17 LAB
B-HCG UR QL: NEGATIVE
EXPIRATION DATE: NORMAL
INTERNAL NEGATIVE CONTROL: NEGATIVE
INTERNAL POSITIVE CONTROL: POSITIVE
Lab: NORMAL

## 2023-07-17 PROCEDURE — 25010000002 CLONIDINE PER 1 MG: Performed by: SURGERY

## 2023-07-17 PROCEDURE — 25010000002 CEFAZOLIN PER 500 MG: Performed by: SURGERY

## 2023-07-17 PROCEDURE — 8E0W4CZ ROBOTIC ASSISTED PROCEDURE OF TRUNK REGION, PERCUTANEOUS ENDOSCOPIC APPROACH: ICD-10-PCS | Performed by: SURGERY

## 2023-07-17 PROCEDURE — 43775 LAP SLEEVE GASTRECTOMY: CPT | Performed by: NURSE PRACTITIONER

## 2023-07-17 PROCEDURE — 25010000002 HYDROMORPHONE PER 4 MG: Performed by: SURGERY

## 2023-07-17 PROCEDURE — 88342 IMHCHEM/IMCYTCHM 1ST ANTB: CPT | Performed by: SURGERY

## 2023-07-17 PROCEDURE — 25010000002 ENOXAPARIN PER 10 MG: Performed by: SURGERY

## 2023-07-17 PROCEDURE — 88307 TISSUE EXAM BY PATHOLOGIST: CPT | Performed by: SURGERY

## 2023-07-17 PROCEDURE — 25010000002 METOCLOPRAMIDE PER 10 MG: Performed by: SURGERY

## 2023-07-17 PROCEDURE — 0DB64Z3 EXCISION OF STOMACH, PERCUTANEOUS ENDOSCOPIC APPROACH, VERTICAL: ICD-10-PCS | Performed by: SURGERY

## 2023-07-17 PROCEDURE — 25010000002 FENTANYL CITRATE (PF) 50 MCG/ML SOLUTION: Performed by: NURSE ANESTHETIST, CERTIFIED REGISTERED

## 2023-07-17 PROCEDURE — 25010000002 EPINEPHRINE 1 MG/ML SOLUTION 30 ML VIAL: Performed by: SURGERY

## 2023-07-17 PROCEDURE — 25010000002 ROPIVACAINE PER 1 MG: Performed by: SURGERY

## 2023-07-17 PROCEDURE — C9399 UNCLASSIFIED DRUGS OR BIOLOG: HCPCS | Performed by: SURGERY

## 2023-07-17 PROCEDURE — S0260 H&P FOR SURGERY: HCPCS | Performed by: SURGERY

## 2023-07-17 PROCEDURE — 25010000002 KETOROLAC TROMETHAMINE PER 15 MG: Performed by: SURGERY

## 2023-07-17 PROCEDURE — 81025 URINE PREGNANCY TEST: CPT | Performed by: SURGERY

## 2023-07-17 PROCEDURE — 25010000002 ONDANSETRON PER 1 MG: Performed by: SURGERY

## 2023-07-17 PROCEDURE — 25010000002 AMISULPRIDE (ANTIEMETIC) 5 MG/2ML SOLUTION: Performed by: SURGERY

## 2023-07-17 PROCEDURE — 43775 LAP SLEEVE GASTRECTOMY: CPT | Performed by: SURGERY

## 2023-07-17 DEVICE — IMPLANTABLE DEVICE
Type: IMPLANTABLE DEVICE | Site: ABDOMEN | Status: FUNCTIONAL
Brand: TITAN SGS STANDARD GASTRIC STAPLER

## 2023-07-17 RX ORDER — PANTOPRAZOLE SODIUM 40 MG/10ML
40 INJECTION, POWDER, LYOPHILIZED, FOR SOLUTION INTRAVENOUS ONCE
Status: COMPLETED | OUTPATIENT
Start: 2023-07-17 | End: 2023-07-17

## 2023-07-17 RX ORDER — NALOXONE HCL 0.4 MG/ML
0.1 VIAL (ML) INJECTION
Status: DISCONTINUED | OUTPATIENT
Start: 2023-07-17 | End: 2023-07-18 | Stop reason: HOSPADM

## 2023-07-17 RX ORDER — HYDRALAZINE HYDROCHLORIDE 20 MG/ML
5 INJECTION INTRAMUSCULAR; INTRAVENOUS
Status: DISCONTINUED | OUTPATIENT
Start: 2023-07-17 | End: 2023-07-17 | Stop reason: HOSPADM

## 2023-07-17 RX ORDER — ONDANSETRON 4 MG/1
4 TABLET, FILM COATED ORAL EVERY 4 HOURS PRN
Status: DISCONTINUED | OUTPATIENT
Start: 2023-07-17 | End: 2023-07-18 | Stop reason: HOSPADM

## 2023-07-17 RX ORDER — FLUMAZENIL 0.1 MG/ML
0.2 INJECTION INTRAVENOUS AS NEEDED
Status: DISCONTINUED | OUTPATIENT
Start: 2023-07-17 | End: 2023-07-17 | Stop reason: HOSPADM

## 2023-07-17 RX ORDER — OXYCODONE AND ACETAMINOPHEN 7.5; 325 MG/1; MG/1
1 TABLET ORAL EVERY 4 HOURS PRN
Status: DISCONTINUED | OUTPATIENT
Start: 2023-07-17 | End: 2023-07-17 | Stop reason: HOSPADM

## 2023-07-17 RX ORDER — CEFAZOLIN SODIUM IN 0.9 % NACL 3 G/100 ML
3 INTRAVENOUS SOLUTION, PIGGYBACK (ML) INTRAVENOUS ONCE
Status: COMPLETED | OUTPATIENT
Start: 2023-07-17 | End: 2023-07-17

## 2023-07-17 RX ORDER — SODIUM CHLORIDE 0.9 % (FLUSH) 0.9 %
3 SYRINGE (ML) INJECTION EVERY 12 HOURS SCHEDULED
Status: DISCONTINUED | OUTPATIENT
Start: 2023-07-17 | End: 2023-07-17 | Stop reason: HOSPADM

## 2023-07-17 RX ORDER — GABAPENTIN 300 MG/1
300 CAPSULE ORAL ONCE
Status: COMPLETED | OUTPATIENT
Start: 2023-07-17 | End: 2023-07-17

## 2023-07-17 RX ORDER — MAGNESIUM HYDROXIDE 1200 MG/15ML
LIQUID ORAL AS NEEDED
Status: DISCONTINUED | OUTPATIENT
Start: 2023-07-17 | End: 2023-07-17 | Stop reason: HOSPADM

## 2023-07-17 RX ORDER — IPRATROPIUM BROMIDE AND ALBUTEROL SULFATE 2.5; .5 MG/3ML; MG/3ML
3 SOLUTION RESPIRATORY (INHALATION) ONCE AS NEEDED
Status: DISCONTINUED | OUTPATIENT
Start: 2023-07-17 | End: 2023-07-17 | Stop reason: HOSPADM

## 2023-07-17 RX ORDER — SODIUM CHLORIDE 0.9 % (FLUSH) 0.9 %
3-10 SYRINGE (ML) INJECTION AS NEEDED
Status: DISCONTINUED | OUTPATIENT
Start: 2023-07-17 | End: 2023-07-17 | Stop reason: HOSPADM

## 2023-07-17 RX ORDER — SODIUM CHLORIDE 9 MG/ML
40 INJECTION, SOLUTION INTRAVENOUS AS NEEDED
Status: DISCONTINUED | OUTPATIENT
Start: 2023-07-17 | End: 2023-07-17 | Stop reason: HOSPADM

## 2023-07-17 RX ORDER — PROMETHAZINE HYDROCHLORIDE 25 MG/1
25 SUPPOSITORY RECTAL ONCE AS NEEDED
Status: DISCONTINUED | OUTPATIENT
Start: 2023-07-17 | End: 2023-07-17 | Stop reason: HOSPADM

## 2023-07-17 RX ORDER — PROMETHAZINE HYDROCHLORIDE 12.5 MG/1
12.5 SUPPOSITORY RECTAL EVERY 4 HOURS PRN
Status: DISCONTINUED | OUTPATIENT
Start: 2023-07-17 | End: 2023-07-18 | Stop reason: HOSPADM

## 2023-07-17 RX ORDER — EPHEDRINE SULFATE 50 MG/ML
5 INJECTION, SOLUTION INTRAVENOUS ONCE AS NEEDED
Status: DISCONTINUED | OUTPATIENT
Start: 2023-07-17 | End: 2023-07-17 | Stop reason: HOSPADM

## 2023-07-17 RX ORDER — METOCLOPRAMIDE HYDROCHLORIDE 5 MG/ML
10 INJECTION INTRAMUSCULAR; INTRAVENOUS EVERY 6 HOURS
Status: DISCONTINUED | OUTPATIENT
Start: 2023-07-17 | End: 2023-07-18 | Stop reason: HOSPADM

## 2023-07-17 RX ORDER — ALBUTEROL SULFATE 2.5 MG/3ML
2.5 SOLUTION RESPIRATORY (INHALATION) EVERY 4 HOURS PRN
Status: DISCONTINUED | OUTPATIENT
Start: 2023-07-17 | End: 2023-07-18 | Stop reason: HOSPADM

## 2023-07-17 RX ORDER — LABETALOL HYDROCHLORIDE 5 MG/ML
5 INJECTION, SOLUTION INTRAVENOUS
Status: DISCONTINUED | OUTPATIENT
Start: 2023-07-17 | End: 2023-07-17 | Stop reason: HOSPADM

## 2023-07-17 RX ORDER — PROMETHAZINE HYDROCHLORIDE 25 MG/1
25 TABLET ORAL ONCE AS NEEDED
Status: DISCONTINUED | OUTPATIENT
Start: 2023-07-17 | End: 2023-07-17 | Stop reason: HOSPADM

## 2023-07-17 RX ORDER — MIDAZOLAM HYDROCHLORIDE 1 MG/ML
1 INJECTION INTRAMUSCULAR; INTRAVENOUS
Status: DISCONTINUED | OUTPATIENT
Start: 2023-07-17 | End: 2023-07-17 | Stop reason: HOSPADM

## 2023-07-17 RX ORDER — MIRTAZAPINE 15 MG/1
15 TABLET, ORALLY DISINTEGRATING ORAL NIGHTLY PRN
Status: DISCONTINUED | OUTPATIENT
Start: 2023-07-17 | End: 2023-07-18 | Stop reason: HOSPADM

## 2023-07-17 RX ORDER — HYDROMORPHONE HYDROCHLORIDE 2 MG/1
2 TABLET ORAL EVERY 4 HOURS PRN
Status: DISCONTINUED | OUTPATIENT
Start: 2023-07-17 | End: 2023-07-18 | Stop reason: HOSPADM

## 2023-07-17 RX ORDER — ENOXAPARIN SODIUM 100 MG/ML
40 INJECTION SUBCUTANEOUS ONCE
Status: COMPLETED | OUTPATIENT
Start: 2023-07-18 | End: 2023-07-18

## 2023-07-17 RX ORDER — SODIUM CHLORIDE 0.9 % (FLUSH) 0.9 %
3 SYRINGE (ML) INJECTION EVERY 12 HOURS SCHEDULED
Status: DISCONTINUED | OUTPATIENT
Start: 2023-07-17 | End: 2023-07-18 | Stop reason: HOSPADM

## 2023-07-17 RX ORDER — FENTANYL CITRATE 50 UG/ML
50 INJECTION, SOLUTION INTRAMUSCULAR; INTRAVENOUS ONCE AS NEEDED
Status: DISCONTINUED | OUTPATIENT
Start: 2023-07-17 | End: 2023-07-17 | Stop reason: HOSPADM

## 2023-07-17 RX ORDER — SODIUM CHLORIDE, SODIUM LACTATE, POTASSIUM CHLORIDE, CALCIUM CHLORIDE 600; 310; 30; 20 MG/100ML; MG/100ML; MG/100ML; MG/100ML
150 INJECTION, SOLUTION INTRAVENOUS CONTINUOUS
Status: DISCONTINUED | OUTPATIENT
Start: 2023-07-17 | End: 2023-07-18 | Stop reason: HOSPADM

## 2023-07-17 RX ORDER — LORAZEPAM 1 MG/1
1 TABLET ORAL EVERY 12 HOURS PRN
Status: DISCONTINUED | OUTPATIENT
Start: 2023-07-17 | End: 2023-07-18 | Stop reason: HOSPADM

## 2023-07-17 RX ORDER — CHLORHEXIDINE GLUCONATE 0.12 MG/ML
15 RINSE ORAL SEE ADMIN INSTRUCTIONS
Status: COMPLETED | OUTPATIENT
Start: 2023-07-17 | End: 2023-07-17

## 2023-07-17 RX ORDER — PROMETHAZINE HYDROCHLORIDE 12.5 MG/1
12.5 TABLET ORAL EVERY 4 HOURS PRN
Status: DISCONTINUED | OUTPATIENT
Start: 2023-07-17 | End: 2023-07-18 | Stop reason: HOSPADM

## 2023-07-17 RX ORDER — FAMOTIDINE 10 MG/ML
20 INJECTION, SOLUTION INTRAVENOUS ONCE
Status: COMPLETED | OUTPATIENT
Start: 2023-07-17 | End: 2023-07-17

## 2023-07-17 RX ORDER — LOSARTAN POTASSIUM 100 MG/1
100 TABLET ORAL EVERY EVENING
Status: DISCONTINUED | OUTPATIENT
Start: 2023-07-17 | End: 2023-07-18 | Stop reason: HOSPADM

## 2023-07-17 RX ORDER — PROMETHAZINE HYDROCHLORIDE 25 MG/1
12.5 TABLET ORAL ONCE AS NEEDED
Status: DISCONTINUED | OUTPATIENT
Start: 2023-07-17 | End: 2023-07-17 | Stop reason: HOSPADM

## 2023-07-17 RX ORDER — DIPHENHYDRAMINE HYDROCHLORIDE 50 MG/ML
12.5 INJECTION INTRAMUSCULAR; INTRAVENOUS
Status: DISCONTINUED | OUTPATIENT
Start: 2023-07-17 | End: 2023-07-17 | Stop reason: HOSPADM

## 2023-07-17 RX ORDER — DROPERIDOL 2.5 MG/ML
0.62 INJECTION, SOLUTION INTRAMUSCULAR; INTRAVENOUS
Status: DISCONTINUED | OUTPATIENT
Start: 2023-07-17 | End: 2023-07-17 | Stop reason: HOSPADM

## 2023-07-17 RX ORDER — HYDROMORPHONE HYDROCHLORIDE 1 MG/ML
0.5 INJECTION, SOLUTION INTRAMUSCULAR; INTRAVENOUS; SUBCUTANEOUS
Status: DISCONTINUED | OUTPATIENT
Start: 2023-07-17 | End: 2023-07-17 | Stop reason: HOSPADM

## 2023-07-17 RX ORDER — HYDROMORPHONE HYDROCHLORIDE 1 MG/ML
0.5 INJECTION, SOLUTION INTRAMUSCULAR; INTRAVENOUS; SUBCUTANEOUS
Status: DISCONTINUED | OUTPATIENT
Start: 2023-07-17 | End: 2023-07-18 | Stop reason: HOSPADM

## 2023-07-17 RX ORDER — ONDANSETRON 2 MG/ML
4 INJECTION INTRAMUSCULAR; INTRAVENOUS EVERY 4 HOURS PRN
Status: DISCONTINUED | OUTPATIENT
Start: 2023-07-17 | End: 2023-07-18 | Stop reason: HOSPADM

## 2023-07-17 RX ORDER — PROCHLORPERAZINE EDISYLATE 5 MG/ML
10 INJECTION INTRAMUSCULAR; INTRAVENOUS EVERY 6 HOURS PRN
Status: DISCONTINUED | OUTPATIENT
Start: 2023-07-17 | End: 2023-07-18 | Stop reason: HOSPADM

## 2023-07-17 RX ORDER — METOCLOPRAMIDE HYDROCHLORIDE 5 MG/ML
10 INJECTION INTRAMUSCULAR; INTRAVENOUS ONCE
Status: COMPLETED | OUTPATIENT
Start: 2023-07-17 | End: 2023-07-17

## 2023-07-17 RX ORDER — NALOXONE HCL 0.4 MG/ML
0.2 VIAL (ML) INJECTION AS NEEDED
Status: DISCONTINUED | OUTPATIENT
Start: 2023-07-17 | End: 2023-07-17 | Stop reason: HOSPADM

## 2023-07-17 RX ORDER — FAMOTIDINE 10 MG/ML
20 INJECTION, SOLUTION INTRAVENOUS EVERY 12 HOURS SCHEDULED
Status: DISCONTINUED | OUTPATIENT
Start: 2023-07-17 | End: 2023-07-18 | Stop reason: HOSPADM

## 2023-07-17 RX ORDER — FENTANYL CITRATE 50 UG/ML
50 INJECTION, SOLUTION INTRAMUSCULAR; INTRAVENOUS
Status: DISCONTINUED | OUTPATIENT
Start: 2023-07-17 | End: 2023-07-17 | Stop reason: HOSPADM

## 2023-07-17 RX ORDER — SCOLOPAMINE TRANSDERMAL SYSTEM 1 MG/1
1 PATCH, EXTENDED RELEASE TRANSDERMAL CONTINUOUS
Status: DISCONTINUED | OUTPATIENT
Start: 2023-07-17 | End: 2023-07-18 | Stop reason: HOSPADM

## 2023-07-17 RX ORDER — ACETAMINOPHEN 160 MG/5ML
975 SOLUTION ORAL EVERY 6 HOURS
Status: DISCONTINUED | OUTPATIENT
Start: 2023-07-17 | End: 2023-07-18 | Stop reason: HOSPADM

## 2023-07-17 RX ORDER — ONDANSETRON 4 MG/1
4 TABLET, ORALLY DISINTEGRATING ORAL EVERY 4 HOURS PRN
Status: DISCONTINUED | OUTPATIENT
Start: 2023-07-17 | End: 2023-07-18 | Stop reason: HOSPADM

## 2023-07-17 RX ORDER — SODIUM CHLORIDE, SODIUM LACTATE, POTASSIUM CHLORIDE, CALCIUM CHLORIDE 600; 310; 30; 20 MG/100ML; MG/100ML; MG/100ML; MG/100ML
9 INJECTION, SOLUTION INTRAVENOUS CONTINUOUS
Status: DISCONTINUED | OUTPATIENT
Start: 2023-07-17 | End: 2023-07-17

## 2023-07-17 RX ORDER — ACETAMINOPHEN 500 MG
1000 TABLET ORAL EVERY 6 HOURS
Status: DISCONTINUED | OUTPATIENT
Start: 2023-07-17 | End: 2023-07-18 | Stop reason: HOSPADM

## 2023-07-17 RX ORDER — ONDANSETRON 2 MG/ML
4 INJECTION INTRAMUSCULAR; INTRAVENOUS ONCE AS NEEDED
Status: DISCONTINUED | OUTPATIENT
Start: 2023-07-17 | End: 2023-07-17 | Stop reason: HOSPADM

## 2023-07-17 RX ORDER — HYDROCODONE BITARTRATE AND ACETAMINOPHEN 7.5; 325 MG/1; MG/1
1 TABLET ORAL ONCE AS NEEDED
Status: DISCONTINUED | OUTPATIENT
Start: 2023-07-17 | End: 2023-07-17 | Stop reason: HOSPADM

## 2023-07-17 RX ORDER — ENOXAPARIN SODIUM 100 MG/ML
40 INJECTION SUBCUTANEOUS ONCE
Status: COMPLETED | OUTPATIENT
Start: 2023-07-17 | End: 2023-07-17

## 2023-07-17 RX ORDER — CYANOCOBALAMIN 1000 UG/ML
1000 INJECTION, SOLUTION INTRAMUSCULAR; SUBCUTANEOUS ONCE
Status: COMPLETED | OUTPATIENT
Start: 2023-07-18 | End: 2023-07-18

## 2023-07-17 RX ORDER — DIPHENHYDRAMINE HYDROCHLORIDE 50 MG/ML
25 INJECTION INTRAMUSCULAR; INTRAVENOUS EVERY 4 HOURS PRN
Status: DISCONTINUED | OUTPATIENT
Start: 2023-07-17 | End: 2023-07-18 | Stop reason: HOSPADM

## 2023-07-17 RX ORDER — LIDOCAINE HYDROCHLORIDE 10 MG/ML
0.5 INJECTION, SOLUTION EPIDURAL; INFILTRATION; INTRACAUDAL; PERINEURAL ONCE AS NEEDED
Status: DISCONTINUED | OUTPATIENT
Start: 2023-07-17 | End: 2023-07-17 | Stop reason: HOSPADM

## 2023-07-17 RX ORDER — SODIUM CHLORIDE, SODIUM LACTATE, POTASSIUM CHLORIDE, CALCIUM CHLORIDE 600; 310; 30; 20 MG/100ML; MG/100ML; MG/100ML; MG/100ML
100 INJECTION, SOLUTION INTRAVENOUS CONTINUOUS
Status: DISCONTINUED | OUTPATIENT
Start: 2023-07-17 | End: 2023-07-17

## 2023-07-17 RX ORDER — HYDRALAZINE HYDROCHLORIDE 20 MG/ML
10 INJECTION INTRAMUSCULAR; INTRAVENOUS
Status: DISCONTINUED | OUTPATIENT
Start: 2023-07-17 | End: 2023-07-18 | Stop reason: HOSPADM

## 2023-07-17 RX ADMIN — METOCLOPRAMIDE 10 MG: 5 INJECTION, SOLUTION INTRAMUSCULAR; INTRAVENOUS at 16:46

## 2023-07-17 RX ADMIN — Medication 3 ML: at 20:11

## 2023-07-17 RX ADMIN — CHLORHEXIDINE GLUCONATE 15 ML: 1.2 SOLUTION ORAL at 09:06

## 2023-07-17 RX ADMIN — METOCLOPRAMIDE 10 MG: 5 INJECTION, SOLUTION INTRAMUSCULAR; INTRAVENOUS at 09:17

## 2023-07-17 RX ADMIN — GABAPENTIN 300 MG: 300 CAPSULE ORAL at 09:16

## 2023-07-17 RX ADMIN — FENTANYL CITRATE 50 MCG: 50 INJECTION, SOLUTION INTRAMUSCULAR; INTRAVENOUS at 14:47

## 2023-07-17 RX ADMIN — LOSARTAN POTASSIUM 100 MG: 100 TABLET, FILM COATED ORAL at 21:28

## 2023-07-17 RX ADMIN — PANTOPRAZOLE SODIUM 40 MG: 40 INJECTION, POWDER, FOR SOLUTION INTRAVENOUS at 09:17

## 2023-07-17 RX ADMIN — FAMOTIDINE 20 MG: 10 INJECTION, SOLUTION INTRAVENOUS at 09:06

## 2023-07-17 RX ADMIN — ONDANSETRON 4 MG: 2 INJECTION INTRAMUSCULAR; INTRAVENOUS at 20:13

## 2023-07-17 RX ADMIN — HYOSCYAMINE SULFATE 125 MCG: 0.12 TABLET ORAL; SUBLINGUAL at 16:46

## 2023-07-17 RX ADMIN — SODIUM CHLORIDE, POTASSIUM CHLORIDE, SODIUM LACTATE AND CALCIUM CHLORIDE 150 ML/HR: 600; 310; 30; 20 INJECTION, SOLUTION INTRAVENOUS at 16:46

## 2023-07-17 RX ADMIN — AMISULPRIDE 10 MG: 2.5 INJECTION, SOLUTION INTRAVENOUS at 14:47

## 2023-07-17 RX ADMIN — ENOXAPARIN SODIUM 40 MG: 100 INJECTION SUBCUTANEOUS at 14:47

## 2023-07-17 RX ADMIN — SODIUM CHLORIDE, POTASSIUM CHLORIDE, SODIUM LACTATE AND CALCIUM CHLORIDE 500 ML: 600; 310; 30; 20 INJECTION, SOLUTION INTRAVENOUS at 09:06

## 2023-07-17 RX ADMIN — CEFAZOLIN 3 G: 10 INJECTION, POWDER, FOR SOLUTION INTRAVENOUS at 13:14

## 2023-07-17 RX ADMIN — HYDROMORPHONE HYDROCHLORIDE 0.5 MG: 1 INJECTION, SOLUTION INTRAMUSCULAR; INTRAVENOUS; SUBCUTANEOUS at 20:13

## 2023-07-17 RX ADMIN — HYDROMORPHONE HYDROCHLORIDE 0.5 MG: 1 INJECTION, SOLUTION INTRAMUSCULAR; INTRAVENOUS; SUBCUTANEOUS at 16:45

## 2023-07-17 RX ADMIN — SODIUM CHLORIDE, POTASSIUM CHLORIDE, SODIUM LACTATE AND CALCIUM CHLORIDE 150 ML/HR: 600; 310; 30; 20 INJECTION, SOLUTION INTRAVENOUS at 23:00

## 2023-07-17 RX ADMIN — FAMOTIDINE 20 MG: 10 INJECTION INTRAVENOUS at 20:11

## 2023-07-17 RX ADMIN — METOCLOPRAMIDE 10 MG: 5 INJECTION, SOLUTION INTRAMUSCULAR; INTRAVENOUS at 22:55

## 2023-07-17 RX ADMIN — ACETAMINOPHEN 1000 MG: 500 TABLET ORAL at 22:55

## 2023-07-17 RX ADMIN — SCOPALAMINE 1 PATCH: 1 PATCH, EXTENDED RELEASE TRANSDERMAL at 09:16

## 2023-07-17 NOTE — H&P
Bariatric Consult:  Referred by Shubham Alejandro APRN Cherie Leann Ivory is here today for consult on Consult (Intake/)        History of Present Illness:        Mere Ivory is a 47 y.o. female with morbid obesity with co-morbidities including sleep apnea, hypertension, osteoarthritis, and knee pain who presents for surgical consultation for the above procedure. Mere has completed the initial intake visit and has been examined by our nurse practitioner, dietician, psychologist and underwent the extensive educational teaching process under the guidance of our bariatric coordinator and myself. Mere also has seen or if has not yet will see the educational video CRISTAL on the surgical procedure if available. Mere attended today more educational teaching from our bariatric coordinator and myself. Mere has had an extensive medical workup including a visit with their primary care physician, EKG, chest radiograph, blood work, EGD or UGI and possibly further testing. These have been reviewed by me and discussed with the patient. Mere is now ready to proceed with surgery. Mere presently denies nausea, vomiting, fever, chills, chest pain, shortness of air, melena, hematochezia, hemetemesis, dysuria, frequency, hematuria.         Medical History        Past Medical History:   Diagnosis Date    Achilles tendon rupture, right, subsequent encounter      Acute respiratory failure with hypoxemia 02/28/2023    Ankle pain, right      Arthritis      HTN (hypertension)      Irregular menstrual cycle 04/18/2023    BRANDT on CPAP 6/13/2023                 Encounter Diagnoses   Name Primary?    Class 3 severe obesity due to excess calories with serious comorbidity and body mass index (BMI) of 50.0 to 59.9 in adult Yes    PCOS (polycystic ovarian syndrome)      Dietary counseling      Primary hypertension      BRANDT on CPAP      Multiple joint pain           Surgical History         Past Surgical History:    Procedure Laterality Date    ACHILLES TENDON SURGERY Right 03/18/2022     Procedure: ACHILLES TENDON REPAIR, BONE SPUR EXCISION;  Surgeon: Sergei Russ DPM;  Location: [facility] MAIN OR;  Service: Podiatry;  Laterality: Right;    LASIK   2011                Patient Active Problem List   Diagnosis    Arthritis    Hypertension    Dietary counseling    Multiple joint pain    Snoring    Heartburn    PCOS (polycystic ovarian syndrome)    Borderline hyperlipidemia    Asthma    BRANDT on CPAP    Class 3 severe obesity due to excess calories with serious comorbidity and body mass index (BMI) of 50.0 to 59.9 in adult         No Known Allergies        Current Outpatient Medications:     albuterol sulfate  (90 Base) MCG/ACT inhaler, Inhale 2 puffs Every 4 (Four) Hours As Needed for Wheezing., Disp: 8.5 g, Rfl: 11    cetirizine (zyrTEC) 10 MG tablet, Take 1 tablet by mouth Daily., Disp: 90 tablet, Rfl: 3    Enoxaparin Sodium (LOVENOX) 40 MG/0.4ML solution prefilled syringe syringe, Inject 0.4 mL under the skin into the appropriate area as directed Every 12 (Twelve) Hours for 14 days. Start after surgery unless instructed otherwise, Disp: 11.2 mL, Rfl: 0    Fluticasone-Salmeterol (ADVAIR/WIXELA) 250-50 MCG/ACT DISKUS, Inhale 1 puff 2 (Two) Times a Day., Disp: 60 each, Rfl: 11    folic acid-vit B6-vit B12 (FOLBEE) 2.5-25-1 MG tablet tablet, Take 1 tablet by mouth Daily., Disp: 40 tablet, Rfl: 0    ipratropium-albuterol (DUO-NEB) 0.5-2.5 mg/3 ml nebulizer, Take 3 mL by nebulization Every 4 (Four) Hours As Needed for Shortness of Air or Wheezing for up to 30 days. (Patient taking differently: Take 3 mL by nebulization As Needed for Shortness of Air or Wheezing.), Disp: 360 mL, Rfl: 5    losartan (COZAAR) 100 MG tablet, TAKE 1 TABLET BY MOUTH EVERY DAY (Patient taking differently: Take 1 tablet by mouth Daily.), Disp: 90 tablet, Rfl: 1    montelukast (SINGULAIR) 10 MG tablet, Take 1 tablet by mouth Every Night., Disp:  90 tablet, Rfl: 3    ursodiol (Actigall) 300 MG capsule, Take 1 capsule by mouth 2 (Two) Times a Day., Disp: 60 capsule, Rfl: 5     Social History   Social History            Socioeconomic History    Marital status:     Number of children: 0   Tobacco Use    Smoking status: Former       Packs/day: 0.25       Years: 5.00       Pack years: 1.25       Types: Cigarettes       Start date: 2007       Quit date: 2012       Years since quittin.4    Smokeless tobacco: Never    Tobacco comments:       QUIT 2012 WAS A SOCIAL SMOKER FOR 18 YEARS   Vaping Use    Vaping Use: Never used   Substance and Sexual Activity    Alcohol use: Not Currently       Comment: RARELY    Drug use: Never    Sexual activity: Yes       Partners: Male       Birth control/protection: None                  Family History   Problem Relation Age of Onset    Diabetes Mother      Hypertension Mother      Obesity Mother      Hypertension Father      Diabetes Father      Stroke Neg Hx           Review of Systems:  Review of Systems   Constitutional:  Positive for fatigue.   Musculoskeletal:  Positive for arthralgias.   All other systems reviewed and are negative.        Physical Exam:     Vital Signs:  Weight: (!) 156 kg (343 lb)   Body mass index is 51.39 kg/m².  Temp: 97.8 °F (36.6 °C)   Heart Rate: 80   BP: 144/89         Physical Exam  Vitals reviewed.   HENT:      Head: Normocephalic and atraumatic.      Mouth/Throat:      Mouth: Mucous membranes are moist.      Pharynx: Oropharynx is clear.   Eyes:      General: No scleral icterus.     Extraocular Movements: Extraocular movements intact.      Conjunctiva/sclera: Conjunctivae normal.      Pupils: Pupils are equal, round, and reactive to light.   Neck:      Thyroid: No thyromegaly.   Cardiovascular:      Rate and Rhythm: Normal rate.   Pulmonary:      Effort: Pulmonary effort is normal. No respiratory distress.      Breath sounds: Normal breath sounds. No stridor. No wheezing or  rhonchi.   Abdominal:      General: Bowel sounds are normal.      Palpations: Abdomen is soft.      Tenderness: There is no abdominal tenderness. There is no right CVA tenderness, left CVA tenderness, guarding or rebound.      Hernia: No hernia is present.   Musculoskeletal:         General: Normal range of motion.      Cervical back: Normal range of motion and neck supple.   Lymphadenopathy:      Cervical: No cervical adenopathy.   Skin:     General: Skin is warm and dry.      Findings: No erythema.   Neurological:      Mental Status: She is alert and oriented to person, place, and time.   Psychiatric:         Mood and Affect: Mood normal.         Behavior: Behavior normal.         Thought Content: Thought content normal.         Judgment: Judgment normal.            Assessment:     Mere Ivory is a 47 y.o. year old female with medically complicated severe obesity with a BMI of Body mass index is 51.39 kg/m². and multiple co-morbidities listed in the encounter diagnosis.     I think she is an appropriate candidate for this surgery, and is ready to proceed.        Plan/Discussion/Summary:  No hiatal hernia per upper GI.  No PPI.  H. pylori positive and treated and retested negative     The patient has returned to the office for a surgical consultation and has requested to proceed with gastric sleeve.  I have had the opportunity to obtain a history, examine the patient and review the patient's chart. The procedure could be done laparoscopically and or robotically.     The patient understands that surgery is a tool and that weight loss is not guaranteed but only seen in the context of appropriate use, regular follow up, exercise and making appropriate food choices.      I personally discussed the potential complications of the laparoscopic gastric sleeve with this patient.  The patient is well aware of potential complications of the surgery that include but not limited to bleeding, infections, deep vein  thrombosis, pulmonary embolism, pulmonary complications such as pneumonia, cardiac event, hernias, small bowel obstruction, damage to the spleen or other organs, bowel injury, disfiguring scars, failure to lose weight, need for additional surgery, conversion to an open procedure and death.  The patient is also aware of complications which apply in particular to the gastric sleeve and can include but not limited to the leakage of gastric contents at the staple line, the development of an intra-abdominal abscess, gastroesophageal reflux disease, Kim's esophagus, ulcers, vitamin/mineral deficiencies, strictures, and the possibility of converting this procedure to a Rosalia-en-Y gastric bypass. The patient also understands the possibility of requiring an acid reducer medication for the rest of their life.     The risks, benefits, potential complications and alternative therapies were discussed at great length as outlined in our extensive consent forms, online consent and educational teaching processes.     The patient has confirmed the participation in the programs extensive educational activities.     All questions and concerns were answered to patient's satisfaction.  The patient now wishes to proceed with surgery.             The patient has agreed to a postoperative course of anitcoagulant therapy.        I instructed patient that the surgery could be laparoscopically and/or robotically.     I instructed patient to start on a H2 blocker or proton pump inhibitor if not already on one of these medications.     I explained in detail the procedures that are in the consent.  All of these procedures have a chance to convert to open if any technical challenges or complications do occur.  Bariatric surgery is not cosmetic surgery but rather a tool to help a patient make a life-long commitment lifestyle change including diet, exercise, behavior changes, and taking supplemental vitamins and minerals.     Problems after  surgery may require more operations to correct them.     The risks, benefits, alternatives, and potential complications of all of the procedures were explained in detail including, but not limited to death, anesthesia and medication adverse effect, deep venous thrombosis, pulmonary embolism, trocar site/incisional hernia, wound infection, abdominal infection, bleeding, failure to lose weight, gain weight, a change in body image, metabolic complications with vitamin deficiences and anemia.     Weight loss expectations were discussed with the patient in detail. The weight loss operations most commonly performed are the sleeve gastrectomy and the Rosalia-en-Y gastric bypass. These operations result in weight losses up to approximately 25-35% of initial body weight 12 to 24 months after surgery with the gastric bypass usually the higher percent of weight loss but depends on patient using the tool.     For the gastric bypass and loop duodenal switch (ANDREA-S) the risks include but not limited to the following early complications:  Anastomotic leak/peritonitis, Rosalia/Alimentary/biliopancreatic limb obstruction, severe & minor wound infection/seroma, and nausea/vomiting.  Late complications can include but are not limited to malnutrition, vitamin deficiencies, frequent loose stools,  stomal stenosis, marginal ulcer, bowel obstruction, intussusception, internal, and incisional hernia.     Regarding the gastric sleeve, there is higher risk of dysphagia and reflux leading to possible Kim's esophagus compared to a gastric bypass, as well as risk of internal visceral/organ injury, splenectomy, bleeding, infection, leak (which could require further intervention possible conversion to Rosalia-en-Y gastric bypass), stenosis and possibility of regaining weight.     Mere was counseled regarding diagnostic results, instructions for management, risk factor reductions, prognosis, patient and family education, impressions, risks and  benefits of treatment options and importance of compliance with treatment. Total time of the encounter was over 45 minutes counseling the patient regarding the procedure as above and reviewing as well as ordering labs, medications and the procedure.  The chart was also reviewed prior to seeing the patient reviewing previous testing, studies and labs.     Mere understands the surgical procedures and the different surgical options that are available.  She understands the lifestyle changes that are required after surgery and has agreed to follow the guidelines outlined in the weight management program.  She also expressed understanding of the risks involved and had all of female questions answered and desires to proceed.        Darren Wolfe MD

## 2023-07-17 NOTE — OP NOTE
PREOPERATIVE DIAGNOSIS:  Morbid obesity with multiple comorbidities as referenced in the most recent history and physical.    POSTOPERATIVE DIAGNOSIS:  Morbid obesity with multiple comorbidities as referenced in the most recent history and physical.    PROCEDURES PERFORMED:  1.  Robotic assisted laparoscopic sleeve gastrectomy with Titan Stapler # [id number]  2.  Tisseel application    SURGEON:  PAULO Petersen Jr., FACS    ASSISTANT: GLORY Saldana      Surgery assisted and facilitated by a certified physician assistant, who directly resulted in a decreased operative time, anesthetic time, wound exposure, and possibly of an operative wound infection, thereby decreasing patient morbidity and ultimately total expenditures.  The surgical assistant assisted in placement of trochars, take down of the gastrocolic omentum, short gastric vessels and dissection at the angle of His.  Also assisted in retraction of the stomach during stapling so as not to kink the gastric sleeve.  Also assisted in removing of the gastric specimen, closure of the fascial defect as well as closure of the skin incisions.    ANESTHESIA:  General endotracheal and laparoscopic TAP block with Ropivacaine mixture    ESTIMATED BLOOD LOSS:   Less than 25 mL unless dictated below.    FLUIDS:  Crystalloids.    SPECIMENS:  Gastric remnant    DRAINS:  None.    COUNTS:  Correct.    COMPLICATIONS:  None.    INDICATIONS:  This patient with morbid obesity and associated comorbidities presents for elective laparoscopic,robotic, possible open sleeve gastrectomy.  The patient has received medical clearance to proceed.  The patient has undergone our extensive educational process and consent process and wishes to proceed.    DESCRIPTION OF PROCEDURE:  The patient was brought to the operating room and placed supine upon the operating room table. SCD hose were placed.  The patient underwent uneventful general endotracheal anesthesia per the anesthesiology  staff. The abdomen was prepped with ChloraPrep and draped in the usual sterile fashion. Anesthesia staff passed a 38-Latvian bougie into the stomach to decompress the stomach and then was pulled back to the esophagus.      An 8 mm transverse incision was made just to the left of midline.  The peritoneal cavity was entered under direct camera visualization using a 5  mm 0° laparoscope and an Optiview trocar.  The abdomen was then insufflated to a pressure of 12 mmHg with CO2 gas.  Exploratory laparoscopy revealed no evidence of injury from the entrance technique and no significant abnormalities unless addendum dictated below.  An angled laparoscope was then used.  The patient was placed in reverse Trendelenburg position.  Under direct camera visualization two 8 mm trocar was placed in the left lateral midabdominal position.  A 12 mm trocar was placed in the right abdomen.  A Liam retractor was placed through an epigastric incision and used to elevate the left lobe of the liver.      Next the 30 degree 8 mm scope was brought into the 8 mm port just to the left of the umbilicus after the corresponding trocar was docked to the da Zeb robot.  Targeting then took place and then the rest of the trochars were docked to the robot and angled into position.  Instruments were brought in through the trochars in place for laparoscopic view.       I then took control of the surgical console. At this point, approximately intermediate along the greater curvature, the gastrocolic omentum was divided with the Vessel Sealer and this proceeded superiorly to the angle of His taking down the short gastric vessels.  All posterior attachments of the lesser sac and posterior aspect of the stomach to the pancreas were taken down as well.      Dissection then proceeded medially taking down the greater curvature with the vessel sealer to just proximal to the pylorus.  The 38-Latvian bougie was passed back down into the stomach to aid in sizing  the sleeve.       The right 12 mm intuitive trocar was removed and replaced with the 19 mm trocar.  The stomach was marked with indelible ink 3 cm at the incisura and approximately 4 to 5 cm from the pylorus.  The Titan stapler was advanced into the abdomen and placed into position and used to create the gastric sleeve.  The stapler was then removed after firing.    Insufflation of the stomach under water was performed and there was no evidence of leak.    Tisseel was then sprayed on the staple line.    The specimen was removed through the 19 mm port site.  The liver retractor was removed. The fascia at the 19 mm trocar site incision that was used for extraction was closed with a single 0 Vicryl suture in a figure-of-eight fashion placed under direct laparoscopic camera visualization with a suture passer and tying the knot extracorporeally.  The fascia in the area was infiltrated with local anesthesia. All incisions were then infiltrated with local anesthetic. The remaining trocars were removed under direct camera visualization with no bleeding noted from their sites.  The abdomen was desufflated of gas. The skin in each incision was closed using 4-0 antibiotic impregnated Monocryl in a subcuticular fashion followed by Dermabond.  The patient tolerated the procedure well without complication and was taken to the recovery room in stable condition.  All sponge, needle and instrument counts were correct.     The hiatus was checked for a hernia and no hernia was detected

## 2023-07-18 VITALS
BODY MASS INDEX: 49.95 KG/M2 | WEIGHT: 293 LBS | TEMPERATURE: 98.2 F | RESPIRATION RATE: 18 BRPM | HEART RATE: 54 BPM | OXYGEN SATURATION: 96 % | SYSTOLIC BLOOD PRESSURE: 145 MMHG | DIASTOLIC BLOOD PRESSURE: 90 MMHG

## 2023-07-18 LAB
ALBUMIN SERPL-MCNC: 4 G/DL (ref 3.5–5.2)
ALBUMIN/GLOB SERPL: 1.3 G/DL
ALP SERPL-CCNC: 63 U/L (ref 39–117)
ALT SERPL W P-5'-P-CCNC: 41 U/L (ref 1–33)
ANION GAP SERPL CALCULATED.3IONS-SCNC: 8.9 MMOL/L (ref 5–15)
AST SERPL-CCNC: 24 U/L (ref 1–32)
BASOPHILS # BLD AUTO: 0.02 10*3/MM3 (ref 0–0.2)
BASOPHILS NFR BLD AUTO: 0.3 % (ref 0–1.5)
BILIRUB SERPL-MCNC: 0.7 MG/DL (ref 0–1.2)
BUN SERPL-MCNC: 7 MG/DL (ref 6–20)
BUN/CREAT SERPL: 10.8 (ref 7–25)
CALCIUM SPEC-SCNC: 8.8 MG/DL (ref 8.6–10.5)
CHLORIDE SERPL-SCNC: 106 MMOL/L (ref 98–107)
CO2 SERPL-SCNC: 23.1 MMOL/L (ref 22–29)
CREAT SERPL-MCNC: 0.65 MG/DL (ref 0.57–1)
DEPRECATED RDW RBC AUTO: 37.6 FL (ref 37–54)
EGFRCR SERPLBLD CKD-EPI 2021: 108.1 ML/MIN/1.73
EOSINOPHIL # BLD AUTO: 0.01 10*3/MM3 (ref 0–0.4)
EOSINOPHIL NFR BLD AUTO: 0.1 % (ref 0.3–6.2)
ERYTHROCYTE [DISTWIDTH] IN BLOOD BY AUTOMATED COUNT: 13.1 % (ref 12.3–15.4)
GLOBULIN UR ELPH-MCNC: 3.1 GM/DL
GLUCOSE SERPL-MCNC: 119 MG/DL (ref 65–99)
HCT VFR BLD AUTO: 34.5 % (ref 34–46.6)
HGB BLD-MCNC: 11.7 G/DL (ref 12–15.9)
IMM GRANULOCYTES # BLD AUTO: 0.05 10*3/MM3 (ref 0–0.05)
IMM GRANULOCYTES NFR BLD AUTO: 0.6 % (ref 0–0.5)
LYMPHOCYTES # BLD AUTO: 1.05 10*3/MM3 (ref 0.7–3.1)
LYMPHOCYTES NFR BLD AUTO: 13.2 % (ref 19.6–45.3)
MAGNESIUM SERPL-MCNC: 2.2 MG/DL (ref 1.6–2.6)
MCH RBC QN AUTO: 27.1 PG (ref 26.6–33)
MCHC RBC AUTO-ENTMCNC: 33.9 G/DL (ref 31.5–35.7)
MCV RBC AUTO: 80 FL (ref 79–97)
MONOCYTES # BLD AUTO: 0.4 10*3/MM3 (ref 0.1–0.9)
MONOCYTES NFR BLD AUTO: 5 % (ref 5–12)
NEUTROPHILS NFR BLD AUTO: 6.44 10*3/MM3 (ref 1.7–7)
NEUTROPHILS NFR BLD AUTO: 80.8 % (ref 42.7–76)
NRBC BLD AUTO-RTO: 0 /100 WBC (ref 0–0.2)
PHOSPHATE SERPL-MCNC: 3 MG/DL (ref 2.5–4.5)
PLATELET # BLD AUTO: 253 10*3/MM3 (ref 140–450)
PMV BLD AUTO: 9.9 FL (ref 6–12)
POTASSIUM SERPL-SCNC: 4.1 MMOL/L (ref 3.5–5.2)
PROT SERPL-MCNC: 7.1 G/DL (ref 6–8.5)
RBC # BLD AUTO: 4.31 10*6/MM3 (ref 3.77–5.28)
SODIUM SERPL-SCNC: 138 MMOL/L (ref 136–145)
WBC NRBC COR # BLD: 7.97 10*3/MM3 (ref 3.4–10.8)

## 2023-07-18 PROCEDURE — 80053 COMPREHEN METABOLIC PANEL: CPT | Performed by: SURGERY

## 2023-07-18 PROCEDURE — 84100 ASSAY OF PHOSPHORUS: CPT | Performed by: SURGERY

## 2023-07-18 PROCEDURE — 25010000002 CYANOCOBALAMIN PER 1000 MCG: Performed by: SURGERY

## 2023-07-18 PROCEDURE — 25010000002 ENOXAPARIN PER 10 MG: Performed by: SURGERY

## 2023-07-18 PROCEDURE — 85025 COMPLETE CBC W/AUTO DIFF WBC: CPT | Performed by: SURGERY

## 2023-07-18 PROCEDURE — 83735 ASSAY OF MAGNESIUM: CPT | Performed by: SURGERY

## 2023-07-18 PROCEDURE — 25010000002 METOCLOPRAMIDE PER 10 MG: Performed by: SURGERY

## 2023-07-18 PROCEDURE — 25010000002 THIAMINE HCL 200 MG/2ML SOLUTION 2 ML VIAL: Performed by: SURGERY

## 2023-07-18 RX ORDER — TRAMADOL HYDROCHLORIDE 50 MG/1
50 TABLET ORAL EVERY 6 HOURS PRN
Qty: 12 TABLET | Refills: 0 | Status: CANCELLED | OUTPATIENT
Start: 2023-07-18

## 2023-07-18 RX ORDER — ENOXAPARIN SODIUM 100 MG/ML
40 INJECTION SUBCUTANEOUS EVERY 12 HOURS SCHEDULED
Qty: 11.2 ML | Refills: 0
Start: 2023-07-18 | End: 2023-08-01

## 2023-07-18 RX ORDER — ONDANSETRON 4 MG/1
4 TABLET, ORALLY DISINTEGRATING ORAL EVERY 4 HOURS PRN
Qty: 14 TABLET | Refills: 0 | Status: SHIPPED | OUTPATIENT
Start: 2023-07-18

## 2023-07-18 RX ORDER — ONDANSETRON 4 MG/1
4 TABLET, ORALLY DISINTEGRATING ORAL EVERY 4 HOURS PRN
Qty: 30 TABLET | Refills: 0 | Status: CANCELLED | OUTPATIENT
Start: 2023-07-18

## 2023-07-18 RX ORDER — TRAMADOL HYDROCHLORIDE 50 MG/1
50 TABLET ORAL EVERY 6 HOURS PRN
Qty: 12 TABLET | Refills: 0 | Status: SHIPPED | OUTPATIENT
Start: 2023-07-18

## 2023-07-18 RX ADMIN — ENOXAPARIN SODIUM 40 MG: 100 INJECTION SUBCUTANEOUS at 09:11

## 2023-07-18 RX ADMIN — CYANOCOBALAMIN 1000 MCG: 1000 INJECTION, SOLUTION INTRAMUSCULAR; SUBCUTANEOUS at 09:11

## 2023-07-18 RX ADMIN — FOLIC ACID 250 ML/HR: 5 INJECTION, SOLUTION INTRAMUSCULAR; INTRAVENOUS; SUBCUTANEOUS at 01:00

## 2023-07-18 RX ADMIN — Medication 3 ML: at 09:11

## 2023-07-18 RX ADMIN — METOCLOPRAMIDE 10 MG: 5 INJECTION, SOLUTION INTRAMUSCULAR; INTRAVENOUS at 04:30

## 2023-07-18 RX ADMIN — ACETAMINOPHEN 1000 MG: 500 TABLET ORAL at 04:29

## 2023-07-18 RX ADMIN — SODIUM CHLORIDE, POTASSIUM CHLORIDE, SODIUM LACTATE AND CALCIUM CHLORIDE 150 ML/HR: 600; 310; 30; 20 INJECTION, SOLUTION INTRAVENOUS at 05:09

## 2023-07-18 RX ADMIN — ACETAMINOPHEN 1000 MG: 500 TABLET ORAL at 10:42

## 2023-07-18 RX ADMIN — FAMOTIDINE 20 MG: 10 INJECTION INTRAVENOUS at 09:11

## 2023-07-18 NOTE — PLAN OF CARE
Goal Outcome Evaluation:  Plan of Care Reviewed With: patient        Progress: improving  Outcome Evaluation: Lap sites cdi, dilaudid given for pain x1, Zofran given for nausea x1, ambulated in halls w/ standby assist, ivf infusing, Thiamine bag given, scds on, accumax to bed, IS up to 2500, CPAP in use, possible d/c today

## 2023-07-18 NOTE — DISCHARGE INSTRUCTIONS
GOING HOME AFTER GASTRIC SLEEVE/ GASTRIC BYPASS SURGERY  [facility] Weight Loss: Post-Operative Information/Instructions  Darren Wolfe Jr., MD  General Patient Instructions for Discharge   - Call Surgeon's office at [phone] for follow-up appointment.    - Be sure you, the patient, have a follow-up appointment to be seen within seven (7) days after discharge. If not, please call [phone] to schedule an appointment. If you are discharged on a Saturday or Sunday, please call Monday to schedule the appointment.  - Contact the Surgeon at [phone] for any questions or concerns, including temperature greater than or equal to 101F, shortness of breath, leg swelling, redness at incision sites, nausea, vomiting, chills, or problems or questions.    - Follow the Gastric Stage 1 Diet    à Clear liquids, room temperature, sugar-free, caffeine-free, non-carbonated, 70 grams of protein, No Straws.  - You may shower. No tub bath for 2 weeks.  - No lifting, pushing, pulling, or tugging >25 pounds for 3 weeks.  - Ambulate every 3 hours while awake minimum for seven (7) days, increase distance daily.  - For the next several weeks, you are at an increased risk for blood clot formation. Therefore, you should walk regularly. You should not sit for prolonged periods of time, more than 45 minutes, without getting up and walking for 5-10 minutes. This includes any car rides, including the drive home from the hospital. If driving any distance greater than 30 miles over the next two (2) weeks, stop every 30-45 minutes and walk for 5-10 minutes each time.  - Continue using Incentive Spirometer and coughing exercises at least every two (2) hours while awake for one week.  - Continue use of CPAP/BIPAP for diagnosis of sleep apnea as directed.  - No driving or operating machinery allowed while taking narcotic (prescription) pain medication, and until you feel comfortable forcefully applying the brakes if needed. (This  usually takes more than 3 days.)    - Make an appointment with your Primary Care Physician within one week post-op to look at your home medications for possible changes or discontinuity.   Medications  - The nurse will provide a list of medications for you to continue at home   - If you received a Lovenox (Enoxaparin) or Apixiban (Eliquis) prescription at pre-op visit with Surgeon, start taking the medicine the morning after discharge unless directed otherwise.    - If you were prescribed Lovenox (Enoxaparin), review the education/teaching material/video with the nurse.    - Take post op pain meds as prescribed as needed.   - Continue Foltx until finished.   - Resume use of Actigall (Ursodiol) one (1) week after surgery if patient still has gallbladder. You should have been given a prescription at your pre-op visit. Contact the office if you do not have the prescription.   - Resume bariatric vitamin regimen as instructed in pre-op education with bariatric coordinator.    - Zegerid or Prilosec OTC (or generic) by mouth once daily for four (4) weeks unless you are already taking a proton pump inhibitor as home medication. Follow dosing instructions on package.   Nausea/Vomiting:  The following are possible causes for nausea/vomiting:  - Drinking too much or too fast.  - Sinus drainage/post nasal drip for allergy sufferers (you may take Sudafed, Claritin, Tylenol Sinus/Allergy, or other decongestants and nose sprays to help with this discomfort).  - Low blood sugar (sweating, shaky, irritable, weakness, dizzy or tunnel-vision) - treatment is to sip 100% fruit juice - no sugar added until symptoms subside.  - Acid in fruit juice - (may dilute with water or avoid).  - Eating or drinking something that is not on clear liquid (stage 1) diet.  Any nausea/vomiting that prohibits you from keeping fluids down for greater than 24 hours requires a call to the surgeon's office.  Urine:  Use your urine color as a guide to  determine if you are drinking enough fluid. The darker the urine, the more fluids you need to drink. Urine should be clear to light yellow if you are getting enough fluid. If you should experience frequency, burning or pain with urination, blood in urine, contact us or your primary care physician for possible UTI (urinary tract infection), which could require antibiotics (liquid preferred).  Bowel Movements:  You may not have a bowel movement for 2-5 days after going home. You may then experience liquid, runny or loose stools for approximately 3-4 weeks following surgery. This would require you to drink even more fluids to prevent dehydration. Some patients may experience constipation, which can be treated with increased fluids, drinking warm liquids, increased activity and the use of a Fleets Enema, Milk of Magnesia, or suppositories. The first couple of bowel movements could be bloody, tarry black or dark maroon in color. This is OK as long as the stool returns to a normal color in 1-2 days. If however, you have frequent or a large amount of bloody or tarry black stools and/or become light-headed or dizzy, you may be bleeding and require urgent attention. Please call us right away.  Abdominal Incisions:  You will have small incisions. Do not scrub incisions, but allow the warm, soapy water to run over the incisions, rinse well, and pat dry. You may use any brand of anti-bacterial soap. Do not use Peroxide or Neosporin type ointments on sites, unless instructed to do so by a surgeon or nurse. Monitor daily for signs/symptoms of infection, which might include: drainage with a foul odor, pain, redness, swelling or heat at the incision sight; fever, body aches and chills. If you suspect infection or have a fever, give us a call.  Pain:  You will be given a prescription for pain medication to control your pain. If you feel the dose is too strong, you may take half the ordered dose, or you may take Tylenol adult liquid  per package instructions for minor pain. Do not take any medications that contain aspirin or aspirin products.  Do not take medications like: Motrin, Aleve, Ibuprofen, Advil, Naproxen, Celebrex, Daypro, Bextra, Meloxicam or other medications commonly used for arthritis or joint pain.  No steroids or cortisone injections. There may be pain, which should improve every few days. Pain should not suddenly get worse or more intense. Pain that suddenly changes and is constant and severe should be called in to the surgeon's office. Any sudden pain in the lower extremities with associated warmth and redness should be called in to the surgeon's office immediately. Do not rub or massage this area, as it could be a blood clot.  Diet:  Remain on the clear liquid diet (stage 1) per your  which includes 70 grams of protein each day, sugar free, non carbonated and no straws. Day 1 is the day of surgery. If you are tolerating the stage 1 diet, you may then proceed to stage 2 diet, as instructed in the . Do not progress to the stage 2 diet if you are having nausea/vomiting. Refer to the Basic Nutrition and Food Principles guide.  Medications:  The nurse will let you know which medications you will need to continue once you go home. Do not take any medications that are extended or time released if you had the gastric bypass procedure, OK to take if you had the gastric sleeve procedure. Large capsules can be opened and diluted with clear liquids. Check with your physician or pharmacist as to which pills may be crushed and which capsules may be opened and diluted safely. Continue taking Foltx as surgeon orders. If you still have your gall bladder and were prescribed Actigall (Ursodiol), you may resume this medication one week after your surgery. You will remain on Actigall (Ursodiol) for approximately 6 months. The dose is 1 pill, 2 times each day for 6 months.  Activity:  Continue your deep breathing and  coughing exercises with your Incentive Spirometer breathing device at least every 3 hours while awake (10 repetitions each time) for one week. May use CPAP. This will help to prevent respiratory problems such as pneumonia. No lifting, pulling or tugging anything over 25 pounds for 3 weeks after surgery. You may shower but no tub baths, hot tubs or swimming for 2 weeks. Moderate walking is recommended every 3 hours while awake minimum, increase distance daily. Further exercise will be discussed at the first post-op visit. No driving or operating machinery allow until off narcotic pain medication and until you feel comfortable forcefully applying the brakes (usually takes 3 or more days). For the next few weeks you are at an increased risk for blood clot formation. Therefore you should walk regularly and you should not sit for prolonged periods of time, more than 45 minutes without getting up and walking for 5-10 minutes. This includes car rides. Including riding home from the hospital. If riding a distance greater than 30 miles over the next 2 weeks stop every 30-45 minutes and walk 5-10 mintues each time. No tanning bed use for 8 weeks after surgery and in general, not recommended due to the increased risk for skin cancer. Incisions will burn/blister very badly with tanning bed use.  Illness:  Your primary care physician should treat general illness such as ear infections, sinus infections, and viral type illnesses, etc. Medications prescribed should be liquid/elixir form when possible, for the first 30 days.  General:  In general, it is recommended that you weigh yourself no more than once per week. Let the weight come off you and concentrate on more important things. Remember the weight was not gained overnight, nor will it be lost overnight. Gastric Bypass/ Gastric Sleeve weight loss will continue over a period of 12-18 months. Do not  yourself according to how others are doing after surgery, as this will  cause unnecessary discouragement.  THE ABOVE ARE GENERAL GUIDELINES TO ASSIST YOU ONCE HOME, IF YOU ARE IN DOUBT, OR YOU HAVE ANY QUESTIONS, CALL US AT THE NUMBERS LISTED BELOW.  IN THE EVENT OF SUDDEN CHEST PAIN, SHORTNESS OF BREATH, OR ANY LIFE THREATENING CONDITION, CALL 911.  Any time you are evaluated or admitted to another facility, please have someone notify the surgeon's office.  Supplements:  70 grams of protein taken EVERY DAY. Remember to drink at least 64 ounces of fluid a day, sipping slowly early on. Increase this amount during the summertime. Sipping slowly will not stretch your new stomach. Drinking too fast or gulping liquids will cause brief discomfort and early could cause staple line disruption (leak). With eating, tiny bites, then chew, chew, chew, and swallow. Lay your fork/spoon down for 2-3 minutes, and then take your next bite. Your pouch will tell you within 1-2 bites if it is going to tolerate what you are eating.   Protein Vendors:  Refer to protein vendors' handout from consult class. You can always find protein drinks at the bariatric office, grocery stores, [employer], drug [employer], [facility], health food stores, and on the Internet. Find one high in protein (15-30 grams per serving) and low carb (less than 18 grams per serving).  Now is a great time to re-read your . Please review specific instructions given to you at discharge by your physician (surgeon).  HOW/WHEN TO CONTACT US:  It is imperative that you contact us with any of the following:    Ÿ fever greater than 101 degrees  Ÿ shortness of breath  Ÿ leg swelling  Ÿ body aches  Ÿ shaking chills  Ÿ nausea and vomitting  Ÿ pain that has worsened  Ÿ redness at incision sites  Ÿ pus or foul smelling drainage from an incision or wound  Ÿ inability to keep fluids down for more than a day  Ÿ any other condition you feel needs our attention.  [facility] - Bariatric: [phone] call this number anytime 24 hours a  day / 7 days a week.  Teach-back Questions to be answered by the patient prior to discharge.   What complications would prompt you to call your doctor when you return home? _________________    What is the purpose of your prescribed medication? ________________  What are some potential side effects of the medications you will be taking at home? _______________

## 2023-07-18 NOTE — PROGRESS NOTES
Case Management Discharge Note      Final Note: Discharged home. Mili Yang RN         Selected Continued Care - Discharged on 7/18/2023 Admission date: 7/17/2023 - Discharge disposition: Home or Self Care       Transportation Services  Private: Car    Final Discharge Disposition Code: 01 - home or self-care

## 2023-07-18 NOTE — PROGRESS NOTES
Continued Stay Note  [facility]     Patient Name: Mere Ivory  MRN: [id number]  Today's Date: 7/18/2023    Admit Date: 7/17/2023    Plan: Home no needs   Discharge Plan       Row Name 07/18/23 1009       Plan    Plan Home no needs    Plan Comments Discharge order noted. Met with patient who confirmed DC plan is to return home. Stated her spouse will assist as needed and will provide transportation at DC. Denies any needs/equipment.                   Discharge Codes    No documentation.                 Expected Discharge Date and Time       Expected Discharge Date Expected Discharge Time    Jul 18, 2023               Mili Yang RN

## 2023-07-18 NOTE — PAYOR COMM NOTE
"Mere Sharma (47 y.o. Female)          DC SUMMARY FOR [id number]          Date of Birth   1975    Social Security Number       Address   [place] [address]    Home Phone   [phone]    MRN   [id number]       Sabianism   Presybeterian    Marital Status                               Admission Date   7/17/23    Admission Type   Elective    Admitting Provider   Darren Wolfe Jr., MD    Attending Provider       Department, Room/Bed   [address], P689/1       Discharge Date   7/18/2023    Discharge Disposition   Home or Self Care    Discharge Destination                                 Attending Provider: (none)   Allergies: No Known Allergies    Isolation: None   Infection: None   Code Status: CPR    Ht: 172.7 cm (68\")   Wt: 149 kg (328 lb 7.8 oz)    Admission Cmt: None   Principal Problem: Class 3 severe obesity due to excess calories with serious comorbidity and body mass index (BMI) of 50.0 to 59.9 in adult [E66.01,Z68.43]                   Active Insurance as of 7/17/2023       Primary Coverage       Payor Plan Insurance Group Employer/Plan Group    [employer] [id number]       Payor Plan Address Payor Plan Phone Number Payor Plan Fax Number Effective Dates    [address] [phone]  4/1/2023 - None Entered    [facility] [id number]         Subscriber Name Subscriber Birth Date Member ID       MERE SHARMA 1975 [id number]               Secondary Coverage       Payor Plan Insurance Group Employer/Plan Group    [facility] COMMUNITY PLAN [facility] COMMUNITY PLAN [facility] [id number]       Payor Plan Address Payor Plan Phone Number Payor Plan Fax Number Effective Dates    [address]   1/1/2023 - None Entered    [facility] [address]         Subscriber Name Subscriber Birth Date Member ID       MERE SHARMA 1975 [id number]                     Emergency Contacts        (Rel.) Home Phone Work " Phone Mobile Phone    MACEY IVORY (Spouse) [phone] -- [phone]                 Discharge Summary        Vicenta Caballero APRN at 07/18/23 0831       Attestation signed by Darren Wolfe Jr., MD at 07/18/23 0858    I have reviewed this documentation and agree.                  Discharge Summary    Patient name: Mere Ivory    Medical record number: [id number]    Admission date: 7/17/2023  Discharge date:      Attending physician: Dr. Darren Wolfe    Primary care physician: Shubham Alejandro APRN    Referring physician: Darren Wolfe Jr., MD  [address] [place] [address]    Condition on discharge: Stable    Primary Diagnoses:  Morbid obesity with co-morbidities    Operative Procedure:  Laparoscopic/robotic gastric sleeve     Mere Ivory  is post op day one status post procedure listed. Patient denies shortness of air and lower extremity pain. Feels better than yesterday. No vomiting this am. Ambulating well and using incentive spirometer.          /67 (BP Location: Right arm, Patient Position: Lying)   Pulse 75   Temp 97 °F (36.1 °C) (Oral)   Resp 18   Wt (!) 149 kg (328 lb 7.8 oz)   LMP 06/21/2023   SpO2 95%   BMI 49.95 kg/m²     General:  alert, appears stated age, cooperative, and no distress   Abdomen: soft, bowel sounds active, appropriate tenderness   Incision:   healing well, no drainage, no erythema, no hernia, no seroma, no swelling, no dehiscence, incision well approximated   Heart: Regular rate   Lungs: Clear to auscultation bilaterally     I reviewed the patient's new clinical results.     Lab Results (last 24 hours)       Procedure Component Value Units Date/Time    Magnesium [[id number]]  (Normal) Collected: 07/18/23 0550    Specimen: Blood Updated: 07/18/23 0637     Magnesium 2.2 mg/dL     Comprehensive Metabolic Panel [[id number]]  (Abnormal) Collected: 07/18/23 0550    Specimen: Blood Updated: 07/18/23 0637     Glucose 119  mg/dL      BUN 7 mg/dL      Creatinine 0.65 mg/dL      Sodium 138 mmol/L      Potassium 4.1 mmol/L      Chloride 106 mmol/L      CO2 23.1 mmol/L      Calcium 8.8 mg/dL      Total Protein 7.1 g/dL      Albumin 4.0 g/dL      ALT (SGPT) 41 U/L      AST (SGOT) 24 U/L      Alkaline Phosphatase 63 U/L      Total Bilirubin 0.7 mg/dL      Globulin 3.1 gm/dL      A/G Ratio 1.3 g/dL      BUN/Creatinine Ratio 10.8     Anion Gap 8.9 mmol/L      eGFR 108.1 mL/min/1.73     Narrative:      GFR Normal >60  Chronic Kidney Disease <60  Kidney Failure <15      Phosphorus [[id number]]  (Normal) Collected: 07/18/23 0550    Specimen: Blood Updated: 07/18/23 0637     Phosphorus 3.0 mg/dL     CBC & Differential [[id number]]  (Abnormal) Collected: 07/18/23 0550    Specimen: Blood Updated: 07/18/23 0622    Narrative:      The following orders were created for panel order CBC & Differential.  Procedure                               Abnormality         Status                     ---------                               -----------         ------                     CBC Auto Differential[[id number]]        Abnormal            Final result                 Please view results for these tests on the individual orders.    CBC Auto Differential [[id number]]  (Abnormal) Collected: 07/18/23 0550    Specimen: Blood Updated: 07/18/23 0622     WBC 7.97 10*3/mm3      RBC 4.31 10*6/mm3      Hemoglobin 11.7 g/dL      Hematocrit 34.5 %      MCV 80.0 fL      MCH 27.1 pg      MCHC 33.9 g/dL      RDW 13.1 %      RDW-SD 37.6 fl      MPV 9.9 fL      Platelets 253 10*3/mm3      Neutrophil % 80.8 %      Lymphocyte % 13.2 %      Monocyte % 5.0 %      Eosinophil % 0.1 %      Basophil % 0.3 %      Immature Grans % 0.6 %      Neutrophils, Absolute 6.44 10*3/mm3      Lymphocytes, Absolute 1.05 10*3/mm3      Monocytes, Absolute 0.40 10*3/mm3      Eosinophils, Absolute 0.01 10*3/mm3      Basophils, Absolute 0.02 10*3/mm3      Immature Grans, Absolute 0.05 10*3/mm3      nRBC  0.0 /100 WBC     Tissue Pathology Exam [[id number]] Collected: 07/17/23 1247    Specimen: Tissue from Stomach Updated: 07/17/23 6945               Assessment:      Doing well postoperatively.      Plan:   1. Continue Stage 1 diet  2. Continue with ambulation and Incentive spirometry  3. Plan for d/c home    Patient was seen and examined by Dr. Wolfe.    Hospital Course: The patient is a very pleasant 47 y.o. female that was admitted to the hospital with morbid obesity with co-morbidities. Patient underwent laparoscopic sleeve gastrectomy (see OP note) without complication. The patient was then admitted to the bariatric unit per protocol where they remained stable. POD #1 she was started on a stage 1 bariatric diet which she tolerated so she was able to be discharged home in good condition.      Discharge medications:      Discharge Medications        New Medications        Instructions Start Date   Enoxaparin Sodium 40 MG/0.4ML solution prefilled syringe syringe  Commonly known as: LOVENOX   40 mg, Subcutaneous, Every 12 Hours Scheduled      ondansetron ODT 4 MG disintegrating tablet  Commonly known as: ZOFRAN-ODT   4 mg, Translingual, Every 4 Hours PRN      traMADol 50 MG tablet  Commonly known as: Ultram   50 mg, Oral, Every 6 Hours PRN             Changes to Medications        Instructions Start Date   ipratropium-albuterol 0.5-2.5 mg/3 ml nebulizer  Commonly known as: DUO-NEB  What changed: when to take this   3 mL, Nebulization, Every 4 Hours PRN      ipratropium-albuterol 0.5-2.5 mg/3 ml nebulizer  Commonly known as: DUO-NEB  What changed: Another medication with the same name was changed. Make sure you understand how and when to take each.   Take 3 mL by nebulization Every 4 (Four) Hours as needed for shortness of air or wheezing             Continue These Medications        Instructions Start Date   cetirizine 10 MG tablet  Commonly known as: zyrTEC   10 mg, Oral, Daily      Fluticasone-Salmeterol 250-50  MCG/ACT DISKUS  Commonly known as: ADVAIR/WIXELA   1 puff, Inhalation, 2 Times Daily - RT      Folbee 2.5-25-1 MG tablet tablet  Generic drug: folic acid-vit B6-vit B12   1 tablet, Oral, Daily      montelukast 10 MG tablet  Commonly known as: SINGULAIR   10 mg, Oral, Nightly      multivitamin with minerals tablet tablet   1 tablet, Oral, Daily      ursodiol 300 MG capsule  Commonly known as: Actigall   300 mg, Oral, 2 Times Daily      Ventolin  (90 Base) MCG/ACT inhaler  Generic drug: albuterol sulfate HFA   2 puffs, Inhalation, Every 4 Hours PRN             Stop These Medications      Chlorhexidine Gluconate 2 % pads            ASK your doctor about these medications        Instructions Start Date   losartan 100 MG tablet  Commonly known as: COZAAR   100 mg, Oral, Daily               Discharge instructions:  Per Bariatric manual; per our protocol      Follow-up appointment: Follow up with Dr. Wolfe in the office as scheduled.  If not already scheduled call for appointment at [phone].    Electronically signed by Darren Wolfe Jr., MD at 07/18/23 8342

## 2023-07-18 NOTE — DISCHARGE SUMMARY
Discharge Summary    Patient name: Mere Ivory    Medical record number: [id number]    Admission date: 7/17/2023  Discharge date:      Attending physician: Dr. Darren Wolfe    Primary care physician: Shubham Alejandro APRN    Referring physician: Darren Wolfe Jr., MD  [address] [address]    Condition on discharge: Stable    Primary Diagnoses:  Morbid obesity with co-morbidities    Operative Procedure:  Laparoscopic/robotic gastric sleeve     Mere Ivory  is post op day one status post procedure listed. Patient denies shortness of air and lower extremity pain. Feels better than yesterday. No vomiting this am. Ambulating well and using incentive spirometer.          /67 (BP Location: Right arm, Patient Position: Lying)   Pulse 75   Temp 97 °F (36.1 °C) (Oral)   Resp 18   Wt (!) 149 kg (328 lb 7.8 oz)   LMP 06/21/2023   SpO2 95%   BMI 49.95 kg/m²     General:  alert, appears stated age, cooperative, and no distress   Abdomen: soft, bowel sounds active, appropriate tenderness   Incision:   healing well, no drainage, no erythema, no hernia, no seroma, no swelling, no dehiscence, incision well approximated   Heart: Regular rate   Lungs: Clear to auscultation bilaterally     I reviewed the patient's new clinical results.     Lab Results (last 24 hours)       Procedure Component Value Units Date/Time    Magnesium [[id number]]  (Normal) Collected: 07/18/23 0550    Specimen: Blood Updated: 07/18/23 0637     Magnesium 2.2 mg/dL     Comprehensive Metabolic Panel [[id number]]  (Abnormal) Collected: 07/18/23 0550    Specimen: Blood Updated: 07/18/23 0637     Glucose 119 mg/dL      BUN 7 mg/dL      Creatinine 0.65 mg/dL      Sodium 138 mmol/L      Potassium 4.1 mmol/L      Chloride 106 mmol/L      CO2 23.1 mmol/L      Calcium 8.8 mg/dL      Total Protein 7.1 g/dL      Albumin 4.0 g/dL      ALT (SGPT) 41 U/L      AST (SGOT) 24 U/L      Alkaline Phosphatase 63 U/L       Total Bilirubin 0.7 mg/dL      Globulin 3.1 gm/dL      A/G Ratio 1.3 g/dL      BUN/Creatinine Ratio 10.8     Anion Gap 8.9 mmol/L      eGFR 108.1 mL/min/1.73     Narrative:      GFR Normal >60  Chronic Kidney Disease <60  Kidney Failure <15      Phosphorus [[id number]]  (Normal) Collected: 07/18/23 0550    Specimen: Blood Updated: 07/18/23 0637     Phosphorus 3.0 mg/dL     CBC & Differential [[id number]]  (Abnormal) Collected: 07/18/23 0550    Specimen: Blood Updated: 07/18/23 0622    Narrative:      The following orders were created for panel order CBC & Differential.  Procedure                               Abnormality         Status                     ---------                               -----------         ------                     CBC Auto Differential[[id number]]        Abnormal            Final result                 Please view results for these tests on the individual orders.    CBC Auto Differential [[id number]]  (Abnormal) Collected: 07/18/23 0550    Specimen: Blood Updated: 07/18/23 0622     WBC 7.97 10*3/mm3      RBC 4.31 10*6/mm3      Hemoglobin 11.7 g/dL      Hematocrit 34.5 %      MCV 80.0 fL      MCH 27.1 pg      MCHC 33.9 g/dL      RDW 13.1 %      RDW-SD 37.6 fl      MPV 9.9 fL      Platelets 253 10*3/mm3      Neutrophil % 80.8 %      Lymphocyte % 13.2 %      Monocyte % 5.0 %      Eosinophil % 0.1 %      Basophil % 0.3 %      Immature Grans % 0.6 %      Neutrophils, Absolute 6.44 10*3/mm3      Lymphocytes, Absolute 1.05 10*3/mm3      Monocytes, Absolute 0.40 10*3/mm3      Eosinophils, Absolute 0.01 10*3/mm3      Basophils, Absolute 0.02 10*3/mm3      Immature Grans, Absolute 0.05 10*3/mm3      nRBC 0.0 /100 WBC     Tissue Pathology Exam [[id number]] Collected: 07/17/23 1247    Specimen: Tissue from Stomach Updated: 07/17/23 1838               Assessment:      Doing well postoperatively.      Plan:   1. Continue Stage 1 diet  2. Continue with ambulation and Incentive spirometry  3. Plan for d/c  home    Patient was seen and examined by Dr. Wolfe.    Hospital Course: The patient is a very pleasant 47 y.o. female that was admitted to the hospital with morbid obesity with co-morbidities. Patient underwent laparoscopic sleeve gastrectomy (see OP note) without complication. The patient was then admitted to the bariatric unit per protocol where they remained stable. POD #1 she was started on a stage 1 bariatric diet which she tolerated so she was able to be discharged home in good condition.      Discharge medications:      Discharge Medications        New Medications        Instructions Start Date   Enoxaparin Sodium 40 MG/0.4ML solution prefilled syringe syringe  Commonly known as: LOVENOX   40 mg, Subcutaneous, Every 12 Hours Scheduled      ondansetron ODT 4 MG disintegrating tablet  Commonly known as: ZOFRAN-ODT   4 mg, Translingual, Every 4 Hours PRN      traMADol 50 MG tablet  Commonly known as: Ultram   50 mg, Oral, Every 6 Hours PRN             Changes to Medications        Instructions Start Date   ipratropium-albuterol 0.5-2.5 mg/3 ml nebulizer  Commonly known as: DUO-NEB  What changed: when to take this   3 mL, Nebulization, Every 4 Hours PRN      ipratropium-albuterol 0.5-2.5 mg/3 ml nebulizer  Commonly known as: DUO-NEB  What changed: Another medication with the same name was changed. Make sure you understand how and when to take each.   Take 3 mL by nebulization Every 4 (Four) Hours as needed for shortness of air or wheezing             Continue These Medications        Instructions Start Date   cetirizine 10 MG tablet  Commonly known as: zyrTEC   10 mg, Oral, Daily      Fluticasone-Salmeterol 250-50 MCG/ACT DISKUS  Commonly known as: ADVAIR/WIXELA   1 puff, Inhalation, 2 Times Daily - RT      Folbee 2.5-25-1 MG tablet tablet  Generic drug: folic acid-vit B6-vit B12   1 tablet, Oral, Daily      montelukast 10 MG tablet  Commonly known as: SINGULAIR   10 mg, Oral, Nightly      multivitamin with  minerals tablet tablet   1 tablet, Oral, Daily      ursodiol 300 MG capsule  Commonly known as: Actigall   300 mg, Oral, 2 Times Daily      Ventolin  (90 Base) MCG/ACT inhaler  Generic drug: albuterol sulfate HFA   2 puffs, Inhalation, Every 4 Hours PRN             Stop These Medications      Chlorhexidine Gluconate 2 % pads            ASK your doctor about these medications        Instructions Start Date   losartan 100 MG tablet  Commonly known as: COZAAR   100 mg, Oral, Daily               Discharge instructions:  Per Bariatric manual; per our protocol      Follow-up appointment: Follow up with Dr. Wolfe in the office as scheduled.  If not already scheduled call for appointment at [phone].

## 2023-07-19 LAB
LAB AP CASE REPORT: NORMAL
LAB AP SPECIAL STAINS: NORMAL
PATH REPORT.FINAL DX SPEC: NORMAL
PATH REPORT.GROSS SPEC: NORMAL

## 2023-07-20 PROBLEM — Z98.84 S/P LAPAROSCOPIC SLEEVE GASTRECTOMY: Status: ACTIVE | Noted: 2023-07-20

## 2023-07-24 ENCOUNTER — READMISSION MANAGEMENT (OUTPATIENT)
Dept: CALL CENTER | Facility: HOSPITAL | Age: 48
End: 2023-07-24
Payer: COMMERCIAL

## 2023-07-24 NOTE — OUTREACH NOTE
Medical Week 1 Survey      Flowsheet Row Responses   [facility] patient discharged from? [place]   Does the patient have one of the following disease processes/diagnoses(primary or secondary)? Other   Week 1 attempt successful? Yes   Call start time 1613   Call end time 1614   Discharge diagnosis Class 3 severe obesity due to excess calories with serious comorbidity and body mass index (BMI) of 50.0 to 59.9 in adult   Person spoke with today (if not patient) and relationship patient   Meds reviewed with patient/caregiver? Yes   Is the patient having any side effects they believe may be caused by any medication additions or changes? No   Does the patient have all medications ordered at discharge? Yes   Is the patient taking all medications as directed (includes completed medication regime)? Yes   Does the patient have a primary care provider?  Yes   Does the patient have an appointment with their PCP within 7 days of discharge? Yes   Comments regarding PCP Javon   Has the patient kept scheduled appointments due by today? Yes   Psychosocial issues? No   Did the patient receive a copy of their discharge instructions? Yes   Nursing interventions Reviewed instructions with patient   What is the patient's perception of their health status since discharge? Improving   Is the patient/caregiver able to teach back the hierarchy of who to call/visit for symptoms/problems? PCP, Specialist, Home health nurse, Urgent Care, ED, 911 Yes   If the patient is a current smoker, are they able to teach back resources for cessation? Not a smoker   Week 1 call completed? Yes   Would this patient benefit from a Referral to Amb Social Work? No   Is the patient interested in additional calls from an ambulatory ?  NOTE:  applies to high risk patients requiring additional follow-up. No   Wrap up additional comments Pt states she is doing well.  Denies needs.   Call end time 1614            DENISE SCOTT - Registered Nurse

## 2023-08-14 ENCOUNTER — OFFICE VISIT (OUTPATIENT)
Dept: SLEEP MEDICINE | Facility: HOSPITAL | Age: 48
End: 2023-08-14
Payer: COMMERCIAL

## 2023-08-14 VITALS — WEIGHT: 293 LBS | HEART RATE: 81 BPM | OXYGEN SATURATION: 100 % | BODY MASS INDEX: 44.41 KG/M2 | HEIGHT: 68 IN

## 2023-08-14 DIAGNOSIS — G47.33 SEVERE OBSTRUCTIVE SLEEP APNEA: Primary | ICD-10-CM

## 2023-08-14 DIAGNOSIS — E66.01 CLASS 3 SEVERE OBESITY DUE TO EXCESS CALORIES WITH BODY MASS INDEX (BMI) OF 45.0 TO 49.9 IN ADULT, UNSPECIFIED WHETHER SERIOUS COMORBIDITY PRESENT: ICD-10-CM

## 2023-08-14 PROCEDURE — G0463 HOSPITAL OUTPT CLINIC VISIT: HCPCS | Performed by: NURSE PRACTITIONER

## 2023-08-14 NOTE — PROGRESS NOTES
"  [address] [address]  Phone: [phone]  Fax: [phone]         SLEEP CLINIC FOLLOW-UP PROGRESS NOTE    Mere Ivory  [id number]   1975  48 y.o.  female      PCP: Shubham Alejandro APRN    DATE OF VISIT: 8/14/2023            CHIEF COMPLAINT: Severe obstructive sleep apnea     HPI:  This is a 48 y.o. year old patient who presents to the clinic today for the management of obstructive sleep apnea.  Patient had a polysomnogram sleep study 5/2023 showing severe obstructive sleep apnea with AHI of 31.7/hr.  This patient is using positive airway pressure therapy with CPAP at set pressure of 14 cm H2O, and the symptoms of sleep apnea have improved with this therapy.  Patient usually goes to bed around 930 AM and wakes up around 4;30 PM .  This patient wakes up 1 time(s) during the night and does not have issues going back to sleep.  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  We did reiterate prioritizing sleep.  No trouble staying awake at work.  Also got gastric sleeve since last visit.  She noticed that she is sleeping better with the CPAP, sleep more restorative as well.  Feels pressures are where they need to be, no complaints.      MEDICATIONS: reviewed     ALLERGIES:  Patient has no known allergies.    SOCIAL HISTORY (habits pertaining to sleep medicine):  History tobacco use: No   History of alcohol use: 0 per week  Caffeine use: 0     REVIEW OF SYSTEMS:   Pertinent positive symptoms are:  [place] Sleepiness Scale:Total score: 5             PHYSICAL EXAMINATION:  CONSTITUTIONAL:  Vitals:    08/14/23 1500   Pulse: 81   SpO2: 100%   Weight: (!) 136 kg (300 lb 3.2 oz)   Height: 172.7 cm (67.99\")    Body mass index is 45.66 kg/mý.   HEAD: atraumatic, normocephalic  RESP SYSTEM: not in respiratory distress, breathing unlabored  CARDIOVASULAR: normal rate, no edema noted   NEURO: Alert and oriented x 3, mood and affect appeared " appropriate      DATA REVIEWED:  The Smart card downloaded on 8/14/2023 has been reviewed independently by me for compliance and discussed the data with the patient.   Compliance: 100%  More than 4 hr use: 90%  Average use of the device: 7 hours 17 minutes per night  Residual AHI: 1.1 /hr (goal < 5.0 /hr)  Mask type: Fullface  Device: ResMed air sense 11 auto CPAP  DME: [employer]          ASSESSMENT AND PLAN:  Obstructive Sleep Apnea (G 47.33): sleep apnea symptoms have improved with the device and treatment.  Patient has excellent compliance with the device for treatment of sleep apnea.  I have personally reviewed the smart card download and discussed the download data with the patient and encouarged continued use of the device.  The residual AHI is acceptable. The device is benefiting the patient and the device is medically necessary.  Without adequate treatment of sleep apnea and without good compliance, patient would be at increased risk of hypertension, diabetes mellitus, pulmonary hypertension, atrial fibrillation, stroke, and nonrestorative sleep with hypersomnia which could increase risk of motor vehicle accidents. The patient is also instructed to get the supplies from the [employer] and and change them on a regular basis.  A prescription for supplies has been sent to the [employer].  I have also discussed with this patient the importance of good sleep hygiene and getting an adequate amount of sleep to aid in overall good health.   Obesity: Body mass index is 45.66 kg/mý.. Patients who are overweight or obese are at increased risk of sleep apnea/ sleep disordered breathing. Weight reduction and healthy lifestyle are encouraged in overweight/ obese patients as part of a comprehensive approach to sleep apnea treatment.    S/p gastric sleeve: She will keep us updated with her weight loss.  We discussed that with weight loss may also need different mask size.  Shiftwork: prioritize sleep.  No trouble staying  awake at work at this time.       Patient will follow-up in 6 months or follow-up sooner for any issues or concerns.  Patient's questions were answered.          Thank you for allowing me to participate in the care of this patient.     Paty Bailey DNP, APRN  [facility]

## 2023-08-21 ENCOUNTER — OFFICE VISIT (OUTPATIENT)
Dept: BARIATRICS/WEIGHT MGMT | Facility: CLINIC | Age: 48
End: 2023-08-21
Payer: COMMERCIAL

## 2023-08-21 VITALS
WEIGHT: 293 LBS | DIASTOLIC BLOOD PRESSURE: 79 MMHG | SYSTOLIC BLOOD PRESSURE: 121 MMHG | TEMPERATURE: 97.9 F | BODY MASS INDEX: 44.41 KG/M2 | HEART RATE: 66 BPM | HEIGHT: 68 IN

## 2023-08-21 DIAGNOSIS — E78.5 BORDERLINE HYPERLIPIDEMIA: ICD-10-CM

## 2023-08-21 DIAGNOSIS — Z98.84 S/P LAPAROSCOPIC SLEEVE GASTRECTOMY: ICD-10-CM

## 2023-08-21 DIAGNOSIS — E66.01 MORBID OBESITY WITH BMI OF 45.0-49.9, ADULT: Primary | ICD-10-CM

## 2023-08-21 DIAGNOSIS — E28.2 PCOS (POLYCYSTIC OVARIAN SYNDROME): ICD-10-CM

## 2023-08-21 DIAGNOSIS — G47.33 OSA ON CPAP: ICD-10-CM

## 2023-08-21 DIAGNOSIS — M25.50 MULTIPLE JOINT PAIN: ICD-10-CM

## 2023-08-21 DIAGNOSIS — Z71.3 DIETARY COUNSELING: ICD-10-CM

## 2023-08-21 DIAGNOSIS — Z99.89 OSA ON CPAP: ICD-10-CM

## 2023-08-21 DIAGNOSIS — R53.82 CHRONIC FATIGUE: ICD-10-CM

## 2023-08-21 DIAGNOSIS — J45.20 INTERMITTENT ASTHMA, UNSPECIFIED ASTHMA SEVERITY, UNSPECIFIED WHETHER COMPLICATED: ICD-10-CM

## 2023-08-21 DIAGNOSIS — I10 PRIMARY HYPERTENSION: ICD-10-CM

## 2023-08-21 PROCEDURE — 99024 POSTOP FOLLOW-UP VISIT: CPT | Performed by: NURSE PRACTITIONER

## 2023-08-21 NOTE — PROGRESS NOTES
[facility] BARIATRIC SURGERY  [address] [facility] [address]  [facility] [id number] [phone]  [address] [id number] [phone]  Dept: [phone]  8/21/2023      Mere Ivory.  [id number]  [id number]  1975  female      Chief Complaint   Patient presents with    Post-op     1 month post op sleeve       BH Post-Op Bariatric Surgery:   Mere Ivory is status post Laparoscopic Sleeve procedure, performed on 7/17/2023     HPI:   Today's weight is 134 kg (296 lb) pounds, today's BMI is Body mass index is 45.02 kg/mý., has a  loss of 30 pounds since the last visit and weight loss since surgery is 61 pounds. The patient reports a decreased portion size and loss of appetite.      Mere Ivory denies nausea, vomiting, reflux, dysphagia and reports tolerating diet.  Had some nausea with celebrate bmtv.  Purchased another brand of bariatric mtv which tolerates well.  Thinks getting 60-70 grams or protein.  Drinks shakes if thinks she is short on protein.  Has been tracking on an sheela on her phone.  Drinks premier clear occasionally as well as isopure unflavored protein powder. Hasn't been taking bp meds.  After surgery, noticed bp dropped.  Was getting dizzy with change in position.  Has been tracking bps at home.  Has been trying to exercise but has significant knee pain.       Diet and Exercise: Diet history reviewed and discussed with the patient. Weight loss/gains to date discussed with the patient. The patient states they are eating 60-70 grams of protein per day. She reports eating 2 meals per day, a typical portion size of 1/2 cup, eating 2 snacks per day, drinking 5+ or more 8-oz. glasses of water per day, no carbonated beverage consumption and exercising regularly.     Supplements: bmtv.     Review of Systems   Constitutional:  Positive for activity change, appetite change and fatigue.   Respiratory:  Negative for  shortness of breath.    Cardiovascular:  Negative for chest pain.   All other systems reviewed and are negative.    Patient Active Problem List   Diagnosis    Arthritis    Hypertension    Dietary counseling    Multiple joint pain    Snoring    Heartburn    PCOS (polycystic ovarian syndrome)    Borderline hyperlipidemia    Asthma    BRANDT on CPAP    Morbid obesity with BMI of 45.0-49.9, adult    S/P laparoscopic sleeve gastrectomy    Chronic fatigue       Past Medical History:   Diagnosis Date    Achilles tendon rupture, right, subsequent encounter     Acute respiratory failure with hypoxemia 02/28/2023    Arthritis     Asthma     HTN (hypertension)     Irregular menstrual cycle 04/18/2023    BRANDT on CPAP 06/13/2023    PCOS (polycystic ovarian syndrome)     PONV (postoperative nausea and vomiting)        The following portions of the patient's history were reviewed and updated as appropriate: allergies, current medications, past medical history, past surgical history, and problem list.    Vitals:    08/21/23 1337   BP: 121/79   Pulse: 66   Temp: 97.9 øF (36.6 øC)       Physical Exam  Vitals reviewed.   Constitutional:       General: She is not in acute distress.     Appearance: Normal appearance. She is morbidly obese.   HENT:      Head: Normocephalic and atraumatic.      Mouth/Throat:      Mouth: Mucous membranes are moist.      Pharynx: Oropharynx is clear.   Eyes:      General: No scleral icterus.     Extraocular Movements: Extraocular movements intact.      Conjunctiva/sclera: Conjunctivae normal.      Pupils: Pupils are equal, round, and reactive to light.   Cardiovascular:      Rate and Rhythm: Normal rate and regular rhythm.   Pulmonary:      Effort: Pulmonary effort is normal. No respiratory distress.   Abdominal:      General: Bowel sounds are normal.      Palpations: Abdomen is soft.   Musculoskeletal:         General: Normal range of motion.      Cervical back: Normal range of motion and neck supple.    Skin:     General: Skin is warm and dry.   Neurological:      General: No focal deficit present.      Mental Status: She is alert and oriented to person, place, and time.   Psychiatric:         Mood and Affect: Mood normal.         Behavior: Behavior normal.         Thought Content: Thought content normal.         Judgment: Judgment normal.       Assessment:   Post-op, the patient is doing well.     Encounter Diagnoses   Name Primary?    Morbid obesity with BMI of 45.0-49.9, adult Yes    S/P laparoscopic sleeve gastrectomy     Primary hypertension     Borderline hyperlipidemia     PCOS (polycystic ovarian syndrome)     Multiple joint pain     Intermittent asthma, unspecified asthma severity, unspecified whether complicated     BRANDT on CPAP     Chronic fatigue     Dietary counseling        Plan:   Will get 1 month labs  Her goal weight is 190#.  12 month goal 240#  Encouraged patient to be sure to get plenty of lean protein per day through small frequent meals all with a protein source.   Activity restrictions: none.   Recommended patient be sure to get at least 70 grams of protein per day by eating small, frequent meals all with high lean protein choices. Be sure to limit/cut back on daily carbohydrate intake. Discussed with the patient the recommended amount of water per day to intake- half of body weight in ounces. Reviewed vitamin requirements. Be sure to do routine exercise, 150 minutes per week minimum, including both cardio and strength training.     Instructions / Recommendations: dietary counseling recommended, recommended a daily protein intake of  grams, vitamin supplement(s) recommended, recommended exercising at least 150 minutes per week, behavior modifications recommended and instructed to call the office for concerns, questions, or problems.     The patient was instructed to follow up in 2 months.     Total time spent during this encounter today was 20 minutes

## 2023-09-18 DIAGNOSIS — I10 HYPERTENSION, UNSPECIFIED TYPE: ICD-10-CM

## 2023-09-18 RX ORDER — LOSARTAN POTASSIUM 100 MG/1
TABLET ORAL
Qty: 90 TABLET | Refills: 2 | OUTPATIENT
Start: 2023-09-18

## 2023-10-02 ENCOUNTER — LAB (OUTPATIENT)
Dept: LAB | Facility: HOSPITAL | Age: 48
End: 2023-10-02
Payer: COMMERCIAL

## 2023-10-02 DIAGNOSIS — R53.82 CHRONIC FATIGUE: ICD-10-CM

## 2023-10-02 DIAGNOSIS — E66.01 MORBID OBESITY WITH BMI OF 45.0-49.9, ADULT: ICD-10-CM

## 2023-10-02 DIAGNOSIS — E78.5 BORDERLINE HYPERLIPIDEMIA: ICD-10-CM

## 2023-10-02 DIAGNOSIS — G47.33 OSA ON CPAP: ICD-10-CM

## 2023-10-02 DIAGNOSIS — M25.50 MULTIPLE JOINT PAIN: ICD-10-CM

## 2023-10-02 DIAGNOSIS — Z71.3 DIETARY COUNSELING: ICD-10-CM

## 2023-10-02 DIAGNOSIS — E28.2 PCOS (POLYCYSTIC OVARIAN SYNDROME): ICD-10-CM

## 2023-10-02 DIAGNOSIS — I10 PRIMARY HYPERTENSION: ICD-10-CM

## 2023-10-02 DIAGNOSIS — J45.20 INTERMITTENT ASTHMA, UNSPECIFIED ASTHMA SEVERITY, UNSPECIFIED WHETHER COMPLICATED: ICD-10-CM

## 2023-10-02 DIAGNOSIS — Z98.84 S/P LAPAROSCOPIC SLEEVE GASTRECTOMY: ICD-10-CM

## 2023-10-02 LAB
25(OH)D3 SERPL-MCNC: 53.1 NG/ML (ref 30–100)
ALBUMIN SERPL-MCNC: 4.3 G/DL (ref 3.5–5.2)
ALBUMIN/GLOB SERPL: 1.1 G/DL
ALP SERPL-CCNC: 69 U/L (ref 39–117)
ALT SERPL W P-5'-P-CCNC: 23 U/L (ref 1–33)
ANION GAP SERPL CALCULATED.3IONS-SCNC: 9.9 MMOL/L (ref 5–15)
AST SERPL-CCNC: 27 U/L (ref 1–32)
BASOPHILS # BLD AUTO: 0.05 10*3/MM3 (ref 0–0.2)
BASOPHILS NFR BLD AUTO: 0.8 % (ref 0–1.5)
BILIRUB SERPL-MCNC: 0.8 MG/DL (ref 0–1.2)
BUN SERPL-MCNC: 13 MG/DL (ref 6–20)
BUN/CREAT SERPL: 20.3 (ref 7–25)
CALCIUM SPEC-SCNC: 9.9 MG/DL (ref 8.6–10.5)
CHLORIDE SERPL-SCNC: 105 MMOL/L (ref 98–107)
CO2 SERPL-SCNC: 27.1 MMOL/L (ref 22–29)
CREAT SERPL-MCNC: 0.64 MG/DL (ref 0.57–1)
DEPRECATED RDW RBC AUTO: 40.7 FL (ref 37–54)
EGFRCR SERPLBLD CKD-EPI 2021: 107.8 ML/MIN/1.73
EOSINOPHIL # BLD AUTO: 0.54 10*3/MM3 (ref 0–0.4)
EOSINOPHIL NFR BLD AUTO: 8.1 % (ref 0.3–6.2)
ERYTHROCYTE [DISTWIDTH] IN BLOOD BY AUTOMATED COUNT: 13.5 % (ref 12.3–15.4)
FERRITIN SERPL-MCNC: 115 NG/ML (ref 13–150)
FOLATE SERPL-MCNC: >20 NG/ML (ref 4.78–24.2)
GLOBULIN UR ELPH-MCNC: 3.8 GM/DL
GLUCOSE SERPL-MCNC: 86 MG/DL (ref 65–99)
HCT VFR BLD AUTO: 35.9 % (ref 34–46.6)
HGB BLD-MCNC: 11.7 G/DL (ref 12–15.9)
IMM GRANULOCYTES # BLD AUTO: 0.03 10*3/MM3 (ref 0–0.05)
IMM GRANULOCYTES NFR BLD AUTO: 0.5 % (ref 0–0.5)
IRON 24H UR-MRATE: 43 MCG/DL (ref 37–145)
LYMPHOCYTES # BLD AUTO: 1.85 10*3/MM3 (ref 0.7–3.1)
LYMPHOCYTES NFR BLD AUTO: 27.8 % (ref 19.6–45.3)
MCH RBC QN AUTO: 27.1 PG (ref 26.6–33)
MCHC RBC AUTO-ENTMCNC: 32.6 G/DL (ref 31.5–35.7)
MCV RBC AUTO: 83.1 FL (ref 79–97)
MONOCYTES # BLD AUTO: 0.47 10*3/MM3 (ref 0.1–0.9)
MONOCYTES NFR BLD AUTO: 7.1 % (ref 5–12)
NEUTROPHILS NFR BLD AUTO: 3.72 10*3/MM3 (ref 1.7–7)
NEUTROPHILS NFR BLD AUTO: 55.7 % (ref 42.7–76)
NRBC BLD AUTO-RTO: 0 /100 WBC (ref 0–0.2)
PLATELET # BLD AUTO: 254 10*3/MM3 (ref 140–450)
PMV BLD AUTO: 10.6 FL (ref 6–12)
POTASSIUM SERPL-SCNC: 4.3 MMOL/L (ref 3.5–5.2)
PREALB SERPL-MCNC: 11.7 MG/DL (ref 20–40)
PROT SERPL-MCNC: 8.1 G/DL (ref 6–8.5)
RBC # BLD AUTO: 4.32 10*6/MM3 (ref 3.77–5.28)
SODIUM SERPL-SCNC: 142 MMOL/L (ref 136–145)
WBC NRBC COR # BLD: 6.66 10*3/MM3 (ref 3.4–10.8)

## 2023-10-02 PROCEDURE — 80053 COMPREHEN METABOLIC PANEL: CPT

## 2023-10-02 PROCEDURE — 83540 ASSAY OF IRON: CPT

## 2023-10-02 PROCEDURE — 84425 ASSAY OF VITAMIN B-1: CPT

## 2023-10-02 PROCEDURE — 83921 ORGANIC ACID SINGLE QUANT: CPT

## 2023-10-02 PROCEDURE — 85025 COMPLETE CBC W/AUTO DIFF WBC: CPT

## 2023-10-02 PROCEDURE — 84134 ASSAY OF PREALBUMIN: CPT

## 2023-10-02 PROCEDURE — 82728 ASSAY OF FERRITIN: CPT

## 2023-10-02 PROCEDURE — 82306 VITAMIN D 25 HYDROXY: CPT

## 2023-10-02 PROCEDURE — 36415 COLL VENOUS BLD VENIPUNCTURE: CPT

## 2023-10-02 PROCEDURE — 82746 ASSAY OF FOLIC ACID SERUM: CPT

## 2023-10-06 LAB — VIT B1 BLD-SCNC: 151 NMOL/L (ref 66.5–200)

## 2023-10-07 LAB — METHYLMALONATE SERPL-SCNC: 157 NMOL/L (ref 0–378)

## 2023-10-17 ENCOUNTER — OFFICE VISIT (OUTPATIENT)
Dept: BARIATRICS/WEIGHT MGMT | Facility: CLINIC | Age: 48
End: 2023-10-17
Payer: COMMERCIAL

## 2023-10-17 VITALS
TEMPERATURE: 98.3 F | WEIGHT: 265 LBS | HEART RATE: 67 BPM | HEIGHT: 68 IN | DIASTOLIC BLOOD PRESSURE: 68 MMHG | BODY MASS INDEX: 40.16 KG/M2 | SYSTOLIC BLOOD PRESSURE: 126 MMHG

## 2023-10-17 DIAGNOSIS — E66.01 MORBID OBESITY WITH BODY MASS INDEX (BMI) OF 40.0 TO 44.9 IN ADULT: Primary | ICD-10-CM

## 2023-10-17 DIAGNOSIS — Z98.84 S/P LAPAROSCOPIC SLEEVE GASTRECTOMY: ICD-10-CM

## 2023-10-17 DIAGNOSIS — Z71.3 DIETARY COUNSELING: ICD-10-CM

## 2023-10-17 PROCEDURE — 99024 POSTOP FOLLOW-UP VISIT: CPT | Performed by: NURSE PRACTITIONER

## 2023-10-17 RX ORDER — OMEPRAZOLE 20 MG/1
20 CAPSULE, DELAYED RELEASE ORAL DAILY
COMMUNITY

## 2023-10-17 NOTE — PROGRESS NOTES
[facility] BARIATRIC SURGERY  [address] [facility] [address]  [facility] [address]  [phone]  [address] [facility] [address]  [facility] [id number] [phone]  Dept: [phone]  10/17/2023      Mere Ivory.  [id number]  [id number]  1975  female      Chief Complaint   Patient presents with    Follow-up     3 mo fu       BH Post-Op Bariatric Surgery:   Mere Ivory is status post Laparoscopic Sleeve procedure, performed on 7/17/2023     HPI:   Today's weight is 120 kg (265 lb) pounds, today's BMI is Body mass index is 40.3 kg/m²., has a  loss of 31 pounds since the last visit and weight loss since surgery is 92 pounds. The patient reports a decreased portion size and loss of appetite.      Mere Ivory denies nausea, vomiting, reflux, dysphagia and reports tolerating regular diet.  At times struggles with water intake.  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  Works weekends and then going to school during the week.  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  Tolerates tuna, turkey, chicken without issues.  Supplements with protein shake and or protein powder.       Diet and Exercise: Diet history reviewed and discussed with the patient. Weight loss/gains to date discussed with the patient. The patient states they are eating  grams of protein per day. She reports eating 2 meals per day, a typical portion size of 1/2 cup, eating 3 snacks per day, drinking 4+ or more 8-oz. glasses of water per day, no carbonated beverage consumption and exercising regularly.     Supplements: bmtv, calcium.     Review of Systems   Constitutional:  Positive for activity change and appetite change.   Respiratory:  Negative for shortness of breath.    Cardiovascular:  Negative for chest pain.   All other systems reviewed and are negative.      Patient Active Problem List   Diagnosis    Arthritis     Hypertension    Dietary counseling    Multiple joint pain    Snoring    Heartburn    PCOS (polycystic ovarian syndrome)    Borderline hyperlipidemia    Asthma    BRANDT on CPAP    Morbid obesity with body mass index (BMI) of 40.0 to 44.9 in adult    S/P laparoscopic sleeve gastrectomy    Chronic fatigue       Past Medical History:   Diagnosis Date    Achilles tendon rupture, right, subsequent encounter     Acute respiratory failure with hypoxemia 02/28/2023    Arthritis     Asthma     HTN (hypertension)     Irregular menstrual cycle 04/18/2023    BRANDT on CPAP 06/13/2023    PCOS (polycystic ovarian syndrome)     PONV (postoperative nausea and vomiting)        The following portions of the patient's history were reviewed and updated as appropriate: allergies, current medications, past medical history, past surgical history, and problem list.    Vitals:    10/17/23 1352   BP: 126/68   Pulse: 67   Temp: 98.3 °F (36.8 °C)       Physical Exam  Vitals reviewed.   Constitutional:       General: She is not in acute distress.     Appearance: Normal appearance. She is morbidly obese.   HENT:      Head: Normocephalic and atraumatic.      Mouth/Throat:      Mouth: Mucous membranes are moist.      Pharynx: Oropharynx is clear.   Eyes:      General: No scleral icterus.     Extraocular Movements: Extraocular movements intact.      Conjunctiva/sclera: Conjunctivae normal.      Pupils: Pupils are equal, round, and reactive to light.   Cardiovascular:      Rate and Rhythm: Normal rate and regular rhythm.   Pulmonary:      Effort: Pulmonary effort is normal. No respiratory distress.   Abdominal:      General: Bowel sounds are normal.      Palpations: Abdomen is soft.   Musculoskeletal:         General: Normal range of motion.      Cervical back: Normal range of motion and neck supple.   Skin:     General: Skin is warm and dry.   Neurological:      General: No focal deficit present.      Mental Status: She is alert and oriented to person,  place, and time.   Psychiatric:         Mood and Affect: Mood normal.         Behavior: Behavior normal.         Thought Content: Thought content normal.         Judgment: Judgment normal.         Assessment:   Post-op, the patient is doing well.     Encounter Diagnoses   Name Primary?    Morbid obesity with body mass index (BMI) of 40.0 to 44.9 in adult Yes    S/P laparoscopic sleeve gastrectomy     Dietary counseling        Plan:     Encouraged patient to be sure to get plenty of lean protein per day through small frequent meals all with a protein source.   Activity restrictions: none.   Recommended patient be sure to get at least 70 grams of protein per day by eating small, frequent meals all with high lean protein choices. Be sure to limit/cut back on daily carbohydrate intake. Discussed with the patient the recommended amount of water per day to intake- half of body weight in ounces. Reviewed vitamin requirements. Be sure to do routine exercise, 150 minutes per week minimum, including both cardio and strength training.     Instructions / Recommendations: dietary counseling recommended, recommended a daily protein intake of  grams, vitamin supplement(s) recommended, recommended exercising at least 150 minutes per week, behavior modifications recommended and instructed to call the office for concerns, questions, or problems.     The patient was instructed to follow up in 3 months.     Total time spent during this encounter today was 25 minutes

## 2023-11-22 ENCOUNTER — OFFICE VISIT (OUTPATIENT)
Dept: PULMONOLOGY | Facility: CLINIC | Age: 48
End: 2023-11-22
Payer: COMMERCIAL

## 2023-11-22 VITALS
BODY MASS INDEX: 39.56 KG/M2 | WEIGHT: 261 LBS | SYSTOLIC BLOOD PRESSURE: 119 MMHG | HEART RATE: 62 BPM | HEIGHT: 68 IN | RESPIRATION RATE: 18 BRPM | OXYGEN SATURATION: 100 % | TEMPERATURE: 98.3 F | DIASTOLIC BLOOD PRESSURE: 72 MMHG

## 2023-11-22 DIAGNOSIS — G47.33 OSA ON CPAP: Primary | ICD-10-CM

## 2023-11-22 DIAGNOSIS — J45.20 INTERMITTENT ASTHMA, UNSPECIFIED ASTHMA SEVERITY, UNSPECIFIED WHETHER COMPLICATED: ICD-10-CM

## 2023-11-22 NOTE — PROGRESS NOTES
Primary Care Provider  Shubham Alejandro APRN   Referring Provider  No ref. provider found      Patient Complaint  Follow-up (5 Months), Asthma, and Sleep Apnea      Subjective          Mere Ivory presents to [facility]MON[facility]INE      History of Presenting Illness  eMre Ivory is a 48 y.o. female with history of asthma, BRANDT on CPAP here for follow-up.    Patient is doing well.  Her asthma is well controlled on Advair twice daily and albuterol which she rarely uses.  She denies cough, sputum, wheezing.  Patient is a never smoker.  Patient is using her CPAP at night which helps her symptoms of fatigue during the day.  Patient is being evaluated for bariatric surgery which may happen sometime next month.  Patient is also on Zyrtec and Singulair.  She asked if she can wean off Zyrtec.  I informed her that this is appropriate if her symptoms are well controlled on Advair alone.  I have personally reviewed the review of systems, past family, social, medical and surgical histories; and agree with their findings.    Interval update: Patient is doing well today.  Her symptoms have been stable on her current regimen of Advair twice a day and as needed albuterol.  She rarely has to use her albuterol rescue inhaler.  Patient had bariatric surgery July of this year she has lost over 100 pounds so far and is doing well.  She continues Zyrtec and Singulair.  No new issues at this time.    Review of Systems  Constitutional:  No fever. No chills. No weakness.  ENT:  No sore throat, URI symptoms.   Cardiovascular:  No chest pain. No palpitations. No lower extremity edema.  Respiratory:  No shortness of breath, cough, pleuritic chest pain. No hemoptysis. No dyspnea.   GI:  Normal appetite. No nausea, vomiting, diarrhea. No melena.  Musculoskeletal:  No arthralgias or myalgias.  Neurologic:  No weakness    Family History   Problem Relation Age of Onset    Diabetes  Mother     Hypertension Mother     Obesity Mother     Hypertension Father     Diabetes Father     Stroke Neg Hx     Malig Hyperthermia Neg Hx         Social History     Socioeconomic History    Marital status:     Number of children: 0   Tobacco Use    Smoking status: Former     Packs/day: 0.25     Years: 5.00     Additional pack years: 0.00     Total pack years: 1.25     Types: Cigarettes     Start date: 2007     Quit date: 2012     Years since quittin.8    Smokeless tobacco: Never    Tobacco comments:     QUIT 2012 WAS A SOCIAL SMOKER FOR 18 YEARS   Vaping Use    Vaping Use: Never used   Substance and Sexual Activity    Alcohol use: Not Currently     Comment: RARELY    Drug use: Never    Sexual activity: Yes     Partners: Male     Birth control/protection: None        Past Medical History:   Diagnosis Date    Achilles tendon rupture, right, subsequent encounter     Acute respiratory failure with hypoxemia 2023    Arthritis     Arthritis 2021    Asthma     Asthma 2023    HTN (hypertension)     Irregular menstrual cycle 2023    BRANDT on CPAP 2023    PCOS (polycystic ovarian syndrome)     PONV (postoperative nausea and vomiting)         Immunization History   Administered Date(s) Administered    COVID-19 (PFIZER) Purple Cap Monovalent 03/10/2021, 2021    Flu Vaccine Quad PF >36MO 10/13/2020    Flublok 18+yrs 10/09/2023    Fluzone (or Fluarix & Flulaval for VFC) >6mos 10/13/2020, 2021, 2022    Influenza, Unspecified 10/01/2019    PPD Test 2019, 2021, 2022    Tdap 2019       No Known Allergies       Current Outpatient Medications:     albuterol sulfate  (90 Base) MCG/ACT inhaler, Inhale 2 puffs Every 4 (Four) Hours As Needed for Wheezing., Disp: 18 g, Rfl: 11    cetirizine (zyrTEC) 10 MG tablet, Take 1 tablet by mouth Daily., Disp: 90 tablet, Rfl: 3    Fluticasone-Salmeterol (ADVAIR/WIXELA) 250-50 MCG/ACT DISKUS, Inhale 1  puff 2 (Two) Times a Day., Disp: 60 each, Rfl: 11    ipratropium-albuterol (DUO-NEB) 0.5-2.5 mg/3 ml nebulizer, Take 3 mL by nebulization Every 4 (Four) Hours As Needed for Shortness of Air or Wheezing for up to 30 days. (Patient taking differently: Take 3 mL by nebulization As Needed for Shortness of Air or Wheezing.), Disp: 360 mL, Rfl: 5    ipratropium-albuterol (DUO-NEB) 0.5-2.5 mg/3 ml nebulizer, Take 3 mL by nebulization Every 4 (Four) Hours as needed for shortness of air or wheezing, Disp: 360 mL, Rfl: 5    losartan (COZAAR) 100 MG tablet, Take 1 tablet by mouth Daily. (Patient taking differently: Take 1 tablet by mouth Every Evening. HOLD EVENING DOSE DAY BEFORE SURGERY), Disp: 90 tablet, Rfl: 1    montelukast (SINGULAIR) 10 MG tablet, Take 1 tablet by mouth Every Night., Disp: 90 tablet, Rfl: 3    multivitamin with minerals tablet tablet, Take 1 tablet by mouth Daily., Disp: , Rfl:     omeprazole (priLOSEC) 20 MG capsule, Take 1 capsule by mouth Daily., Disp: , Rfl:     ursodiol (Actigall) 300 MG capsule, Take 1 capsule by mouth 2 (Two) Times a Day., Disp: 60 capsule, Rfl: 5     Objective     Vital Signs:   There were no vitals taken for this visit.    Physical Exam  Vital Signs Reviewed   WDWN, Alert, NAD.    HEENT:  EOMI.  nares clear  Neck:  Supple, no JVD, no thyromegaly  Chest:  clear to auscultation bilaterally, no wheezes, crackles; no work of breathing noted  CV: RRR, no M/G/R, pulses 2+, equal.  Abd:  Soft, NT, ND, +BS, obese  EXT:  no clubbing, no cyanosis, no edema  Neuro:  A&Ox3, CN grossly intact, no focal deficits.  Skin: No rashes or lesions noted       Result Review :   I have personally reviewed patient's labs and images.              Patient Active Problem List   Diagnosis    Arthritis    Hypertension    Dietary counseling    Multiple joint pain    Snoring    Heartburn    PCOS (polycystic ovarian syndrome)    Borderline hyperlipidemia    Asthma    BRANDT on CPAP    Morbid obesity with body  mass index (BMI) of 40.0 to 44.9 in adult    S/P laparoscopic sleeve gastrectomy    Chronic fatigue       Impression and Plan    Mild intermittent asthma  BRANDT on CPAP  GERD  Obesity, BMI 39.7  Status post bariatric surgery 07/2023    -Asthma well-controlled on Advair twice daily and albuterol as needed  -Continue CPAP for BRANDT  -Status post bariatric surgery 07/2023  -Continue Zyrtec  -Continue Singulair  -Follow-up in 6 months or earlier as needed    Smoking status: Reviewed  Vaccination status: Patient is up-to-date with her COVID and influenza vaccinations at this time.  Patient refuses pneumonia vaccination at this time.  Medications personally reviewed    Follow Up   No follow-ups on file.  Patient was given instructions and counseling regarding her condition or for health maintenance advice. Please see specific information pulled into the AVS if appropriate.

## 2024-01-16 ENCOUNTER — OFFICE VISIT (OUTPATIENT)
Dept: BARIATRICS/WEIGHT MGMT | Facility: CLINIC | Age: 49
End: 2024-01-16
Payer: COMMERCIAL

## 2024-01-16 VITALS
SYSTOLIC BLOOD PRESSURE: 123 MMHG | HEART RATE: 74 BPM | DIASTOLIC BLOOD PRESSURE: 80 MMHG | WEIGHT: 250 LBS | TEMPERATURE: 98.1 F | BODY MASS INDEX: 37.89 KG/M2 | HEIGHT: 68 IN

## 2024-01-16 DIAGNOSIS — E66.9 OBESITY, CLASS II, BMI 35-39.9: Primary | ICD-10-CM

## 2024-01-16 DIAGNOSIS — Z98.84 S/P LAPAROSCOPIC SLEEVE GASTRECTOMY: ICD-10-CM

## 2024-01-16 DIAGNOSIS — Z71.3 DIETARY COUNSELING: ICD-10-CM

## 2024-01-16 PROBLEM — E66.01 MORBID OBESITY WITH BODY MASS INDEX (BMI) OF 40.0 TO 44.9 IN ADULT: Status: RESOLVED | Noted: 2023-06-15 | Resolved: 2024-01-16

## 2024-01-16 PROBLEM — E66.812 OBESITY, CLASS II, BMI 35-39.9: Status: ACTIVE | Noted: 2024-01-16

## 2024-01-16 NOTE — PROGRESS NOTES
[facility]  [address] [facility] [address]  [facility] [address]  [phone]  [address] [facility] [address]  [facility] [id number] [phone]  Dept: [phone]  1/16/2024      Mere Ivory.  [id number]  [id number]  1975  female      Chief Complaint   Patient presents with    Follow-up     6 mo fup sleeve       BH Post-Op Bariatric Surgery:   Mere Ivory is status post Laparoscopic Sleeve procedure, performed on 7/17/2023     HPI:   Today's weight is 113 kg (250 lb) pounds, today's BMI is Body mass index is 38.02 kg/m²., has a  loss of 15 pounds since the last visit and weight loss since surgery is 107 pounds. The patient reports a decreased portion size and loss of appetite.      Mere Ivory denies nausea, vomiting, reflux, dysphagia and reports tolerating regular diet. Has been more disciplined since the holidays are over.  Not walking as much since has gotten colder.  Craving salty, crunchy snacks.  Tracks at times.  Drinks a shake in her coffee every morning.  Has 1/2 steak for lunch and then small snack for dinner instead of meal.  Has granola bar which is high I protien or yogurt with high protein granola.  Tries to stick with low sugar foods.  Snacks sometimes on protein bar as well.       Diet and Exercise: Diet history reviewed and discussed with the patient. Weight loss/gains to date discussed with the patient. The patient states they are eating 100 grams of protein per day. She reports eating 2-3 meals per day, a typical portion size of 1/2 cup, eating 1-2 snacks per day, drinking 6+ or more 8-oz. glasses of water per day, no carbonated beverage consumption and exercising regularly.     Supplements: bmtv.     Review of Systems   Constitutional:  Positive for activity change and appetite change.   Respiratory:  Negative for shortness of breath.    Cardiovascular:  Negative for chest pain.   All other systems  reviewed and are negative.      Patient Active Problem List   Diagnosis    Arthritis    Hypertension    Dietary counseling    Multiple joint pain    Snoring    Heartburn    PCOS (polycystic ovarian syndrome)    Borderline hyperlipidemia    Asthma    BRANDT on CPAP    S/P laparoscopic sleeve gastrectomy    Chronic fatigue    Obesity, Class II, BMI 35-39.9       Past Medical History:   Diagnosis Date    Achilles tendon rupture, right, subsequent encounter     Acute respiratory failure with hypoxemia 02/28/2023    Arthritis     Arthritis 09/23/2021    Asthma     Asthma 06/13/2023    HTN (hypertension)     Irregular menstrual cycle 04/18/2023    BRANDT on CPAP 06/13/2023    PCOS (polycystic ovarian syndrome)     PONV (postoperative nausea and vomiting)        The following portions of the patient's history were reviewed and updated as appropriate: allergies, current medications, past medical history, past surgical history, and problem list.    Vitals:    01/16/24 1352   BP: 123/80   Pulse: 74   Temp: 98.1 °F (36.7 °C)       Physical Exam  Vitals reviewed.   Constitutional:       General: She is not in acute distress.     Appearance: Normal appearance. She is morbidly obese and obese.   HENT:      Head: Normocephalic and atraumatic.      Mouth/Throat:      Mouth: Mucous membranes are moist.      Pharynx: Oropharynx is clear.   Eyes:      General: No scleral icterus.     Extraocular Movements: Extraocular movements intact.      Conjunctiva/sclera: Conjunctivae normal.      Pupils: Pupils are equal, round, and reactive to light.   Cardiovascular:      Rate and Rhythm: Normal rate and regular rhythm.   Pulmonary:      Effort: Pulmonary effort is normal. No respiratory distress.   Abdominal:      General: Bowel sounds are normal.      Palpations: Abdomen is soft.   Musculoskeletal:         General: Normal range of motion.      Cervical back: Normal range of motion and neck supple.   Skin:     General: Skin is warm and dry.    Neurological:      General: No focal deficit present.      Mental Status: She is alert and oriented to person, place, and time.   Psychiatric:         Mood and Affect: Mood normal.         Behavior: Behavior normal.         Thought Content: Thought content normal.         Judgment: Judgment normal.         Assessment:   Post-op, the patient is doing well.     Encounter Diagnoses   Name Primary?    Obesity, Class II, BMI 35-39.9 Yes    S/P laparoscopic sleeve gastrectomy     Dietary counseling        Plan:   Discussed snacking vs grazing.   Discussed hunger may return/increase over the next 3-6 months.  Needs to be having at least 2 meals with real, whole foods per day.    Encouraged patient to be sure to get plenty of lean protein per day through small frequent meals all with a protein source.   Activity restrictions: none.   Recommended patient be sure to get at least 70 grams of protein per day by eating small, frequent meals all with high lean protein choices. Be sure to limit/cut back on daily carbohydrate intake. Discussed with the patient the recommended amount of water per day to intake- half of body weight in ounces. Reviewed vitamin requirements. Be sure to do routine exercise, 150 minutes per week minimum, including both cardio and strength training.     Instructions / Recommendations: dietary counseling recommended, recommended a daily protein intake of  grams, vitamin supplement(s) recommended, recommended exercising at least 150 minutes per week, behavior modifications recommended and instructed to call the office for concerns, questions, or problems.     The patient was instructed to follow up in 3 months.     Total time spent during this encounter today was 25 minutes

## 2024-02-28 ENCOUNTER — OFFICE VISIT (OUTPATIENT)
Dept: OBSTETRICS AND GYNECOLOGY | Facility: CLINIC | Age: 49
End: 2024-02-28
Payer: COMMERCIAL

## 2024-02-28 VITALS
WEIGHT: 265 LBS | SYSTOLIC BLOOD PRESSURE: 149 MMHG | BODY MASS INDEX: 40.3 KG/M2 | HEART RATE: 91 BPM | DIASTOLIC BLOOD PRESSURE: 87 MMHG

## 2024-02-28 DIAGNOSIS — Z01.419 ENCOUNTER FOR GYNECOLOGICAL EXAMINATION WITHOUT ABNORMAL FINDING: Primary | ICD-10-CM

## 2024-02-28 PROCEDURE — G0123 SCREEN CERV/VAG THIN LAYER: HCPCS | Performed by: NURSE PRACTITIONER

## 2024-02-28 PROCEDURE — 87624 HPV HI-RISK TYP POOLED RSLT: CPT | Performed by: NURSE PRACTITIONER

## 2024-02-28 NOTE — PROGRESS NOTES
Well Woman Visit    CC: Scheduled annual well gyn visit  Chief Complaint   Patient presents with    Gynecologic Exam     wwe       Myriad intake in the past?: no  DID NOT QUALIFY TODAY    HPI:   48 y.o.   Social History     Substance and Sexual Activity   Sexual Activity Yes    Partners: Male    Birth control/protection: None       Menses:   q 28-30 days, lasts 6-7 days, 3-4 heavy days, changes products q 2-3hrs on heaviest days.   Pain with menses:  Mild, OTC meds control discomfort      PCP: does manage PMHx and preventative labs  History: PMHx, Meds, Allergies, PSHx, Social Hx, and POBHx all reviewed and updated.    Pt has no complaints today. Inquired about history of PCOS, she states diagnosis from several years back, symptoms, such as period irregularity have improved with weight loss.    PHYSICAL EXAM:  /87   Pulse 91   Wt 120 kg (265 lb)   BMI 40.30 kg/m²  Not found.     Exam conducted with a chaperone present  General- NAD, alert and oriented, appropriate  Psych- Normal mood, good memory  Neck- No masses, no thyroid enlargement  CV- Regular rhythm, no murnurs  Resp- CTA to bases, no wheezes  Abdomen- Soft, non distended, non tender, no masses    Breast left-  Bilaterally symmetrical, no masses, non tender, no nipple discharge  Breast right- Bilaterally symmetrical, no masses, non tender, no nipple discharge    External genitalia- Normal female, no lesions  Urethra/meatus- Normal, no masses, non tender  Bladder- Normal, no masses, non tender  Vagina- Normal, no atrophy, no lesions, no discharge.  Prolapse : none noted, not examined with split speculum to delineate  Cvx- Normal, no lesions, no discharge, No cervical motion tenderness  Uterus- Normal size, shape & consistency.  Non tender, mobile.  Adnexa- No mass, non tender  Anus/Rectum/Perineum- Not performed    Lymphatic- No palpable neck, axillary, or groin nodes  Ext- No edema, no cyanosis    Skin- No lesions, no rashes, hyperpigmentation  noted under breasts, bilateral axilla, and groin area, consistent with appearance of acanthosis nigricans      ASSESSMENT and PLAN:    Diagnoses and all orders for this visit:    1. Encounter for gynecological examination without abnormal finding (Primary)  -     IgP, Aptima HPV  -     Mammo Screening Digital Tomosynthesis Bilateral With CAD; Future        Preventative:  BREAST HEALTH- Monthly self breast exam importance and how to reviewed. MMG and/or MRI (prn) reviewed per society guidelines and her individual history. Screen: Updated today  CERVICAL CANCER Screening- Reviewed current ASCCP guidelines for screening w and wo cotest HPV, age specific.  Screen: Updated today  COLON CANCER Screening- Reviewed current medical society guidelines and options.  Screen:  Pt states has an upcoming appt to schedule colonoscopy  SEXUAL HEALTH: Declines STD screening  VACCINATIONS Recommended: Covid vaccine, Flu annually.  Importance discussed, risk being unvaccinated reviewed.  Questions answered  Smoking status- NON SMOKER        She understands the importance of having any ordered tests to be performed in a timely fashion.  The risks of not performing them include, but are not limited to, advanced cancer stages, bone loss from osteoporosis and/or subsequent increase in morbidity and/or mortality.  She is encouraged to review her results online and/or contact or office if she has questions.     Follow Up:  Return in about 1 year (around 2/28/2025) for Annual physical.            Leroy Burns, APRN  02/28/2024    [facility] [address]  [facility]  [address] [place] [address]  Dept: [phone]  Dept Fax: [phone]  Loc: [phone]    today

## 2024-03-03 LAB
CYTOLOGIST CVX/VAG CYTO: NORMAL
CYTOLOGY CVX/VAG DOC CYTO: NORMAL
CYTOLOGY CVX/VAG DOC THIN PREP: NORMAL
DX ICD CODE: NORMAL
HPV I/H RISK 4 DNA CVX QL PROBE+SIG AMP: NEGATIVE
Lab: NORMAL
OTHER STN SPEC: NORMAL
STAT OF ADQ CVX/VAG CYTO-IMP: NORMAL

## 2024-03-05 ENCOUNTER — OFFICE VISIT (OUTPATIENT)
Dept: SLEEP MEDICINE | Facility: HOSPITAL | Age: 49
End: 2024-03-05
Payer: COMMERCIAL

## 2024-03-05 ENCOUNTER — PREP FOR SURGERY (OUTPATIENT)
Dept: OTHER | Facility: HOSPITAL | Age: 49
End: 2024-03-05
Payer: COMMERCIAL

## 2024-03-05 ENCOUNTER — OFFICE VISIT (OUTPATIENT)
Dept: SURGERY | Facility: CLINIC | Age: 49
End: 2024-03-05
Payer: COMMERCIAL

## 2024-03-05 VITALS
HEART RATE: 73 BPM | SYSTOLIC BLOOD PRESSURE: 146 MMHG | WEIGHT: 256.8 LBS | HEIGHT: 68 IN | BODY MASS INDEX: 38.92 KG/M2 | DIASTOLIC BLOOD PRESSURE: 83 MMHG

## 2024-03-05 VITALS
HEART RATE: 60 BPM | OXYGEN SATURATION: 98 % | BODY MASS INDEX: 38.34 KG/M2 | HEIGHT: 68 IN | SYSTOLIC BLOOD PRESSURE: 141 MMHG | WEIGHT: 253 LBS | DIASTOLIC BLOOD PRESSURE: 74 MMHG

## 2024-03-05 DIAGNOSIS — G47.33 SEVERE OBSTRUCTIVE SLEEP APNEA: Primary | ICD-10-CM

## 2024-03-05 DIAGNOSIS — R19.5 POSITIVE COLORECTAL CANCER SCREENING USING COLOGUARD TEST: Primary | ICD-10-CM

## 2024-03-05 DIAGNOSIS — E66.01 CLASS 2 SEVERE OBESITY WITH SERIOUS COMORBIDITY AND BODY MASS INDEX (BMI) OF 38.0 TO 38.9 IN ADULT, UNSPECIFIED OBESITY TYPE: ICD-10-CM

## 2024-03-05 PROCEDURE — 99203 OFFICE O/P NEW LOW 30 MIN: CPT | Performed by: NURSE PRACTITIONER

## 2024-03-05 PROCEDURE — 99213 OFFICE O/P EST LOW 20 MIN: CPT | Performed by: NURSE PRACTITIONER

## 2024-03-05 PROCEDURE — G0463 HOSPITAL OUTPT CLINIC VISIT: HCPCS

## 2024-03-05 RX ORDER — SODIUM CHLORIDE 9 MG/ML
40 INJECTION, SOLUTION INTRAVENOUS AS NEEDED
OUTPATIENT
Start: 2024-03-05

## 2024-03-05 RX ORDER — SODIUM CHLORIDE 0.9 % (FLUSH) 0.9 %
10 SYRINGE (ML) INJECTION AS NEEDED
OUTPATIENT
Start: 2024-03-05

## 2024-03-05 RX ORDER — POLYETHYLENE GLYCOL 3350 17 G/17G
POWDER, FOR SOLUTION ORAL
Qty: 238 G | Refills: 0 | Status: SHIPPED | OUTPATIENT
Start: 2024-03-05

## 2024-03-05 RX ORDER — SODIUM CHLORIDE 0.9 % (FLUSH) 0.9 %
3 SYRINGE (ML) INJECTION EVERY 12 HOURS SCHEDULED
OUTPATIENT
Start: 2024-03-05

## 2024-03-05 NOTE — PROGRESS NOTES
[address] [address]  [place]   [place] [address]  Phone: [phone]  Fax: [phone]        SLEEP CLINIC FOLLOW-UP PROGRESS NOTE    Mere Ivory  [id number]   1975  48 y.o.  female      PCP: Shubham Alejandro APRN    DATE OF VISIT: 3/5/2024          CHIEF COMPLAINT: Severe obstructive sleep apnea    HPI:  This is a 48 y.o. year old patient who presents to the clinic today for the management of obstructive sleep apnea. Patient had a polysomnogram sleep study 5/2023 showing severe obstructive sleep apnea with AHI of 31.7/hr.  This patient is using positive airway pressure therapy with CPAP at set pressure of 14 cm H2O, and the symptoms of sleep apnea have improved with this therapy.  She presents today for 6-month follow-up.  She continues to do really well with her CPAP, reports she loves her CPAP.  AHI well-controlled at 1.5/h.  No complaints about pressures or settings.  Her overall usage is 97%, greater than 4-hour usage 57% for the last 30 days.  She was caring for her mother who was in the ICU for couple of months and is now in rehab.  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  She has had some decreased sleep time because of this.  Definitely understand that she has a lot going on, did discuss importance of trying to prioritize sleep and increase sleep time and usage of PAP for overall health.      MEDICATIONS: reviewed     ALLERGIES:  Patient has no known allergies.    SOCIAL HISTORY (habits pertaining to sleep medicine):  Tobacco use: No   Alcohol use: 0 per week  Caffeine use: 3     REVIEW OF SYSTEMS:   Pertinent positive symptoms are:  Stanwood Sleepiness Scale :Total score: 0           PHYSICAL EXAMINATION:  CONSTITUTIONAL:  Vitals:    03/05/24 1100   BP: 141/74   BP Location: Left arm   Pulse: 60   SpO2: 98%   Weight: 115 kg (253 lb)   Height:  "172.7 cm (67.99\")    Body mass index is 38.48 kg/m².   HEAD: atraumatic, normocephalic  RESP SYSTEM: not in respiratory distress, breathing unlabored  CARDIOVASULAR: normal rate, no edema noted   NEURO: Alert and oriented x 3, mood and affect appeared appropriate      DATA REVIEWED:  The Smart card downloaded on 2/25/2024 has been reviewed independently by me for compliance and discussed the data with the patient.   Compliance: 97 %  More than 4 hr use: 57 %  Average use of the device: 4 hours 49 minutes per night  Residual AHI: 1.5 /hr (goal < 5.0 /hr)  Mask type: Fullface  Device: ResMed AirSense 11  DME: [employer]          ASSESSMENT AND PLAN:  Obstructive Sleep Apnea: sleep apnea has improved with the device and treatment.  Patient has overall good usage of CPAP and we discussed increasing greater than 4-hour usage again, previously was doing well with this, has been a little busier lately.  She does not feel this will be an issue.  I have personally reviewed the smart card download and discussed the download data with the patient and encouarged continued use of the device.  The residual AHI is acceptable. The device is benefiting the patient and the device is medically necessary.  Recommend patient get supplies from the DME company, change them on a regular basis, and clean as directed.  A prescription for supplies has been sent to the [employer] [facility] [employer].  Also recommend using CPAP a minimum of 4 hours per night to meet insurance criteria, all night every night recommended for optimum health.  Recommend prioritizing sleep to aid in overall health.  Obesity: Body mass index is 38.48 kg/m².. Patients who are overweight or obese are at increased risk of sleep apnea/ sleep disordered breathing. Weight reduction and healthy lifestyle are encouraged in overweight/ obese patients as part of a comprehensive approach to sleep apnea treatment.    S/p gastric sleeve  Shiftwork      Patient will follow-up in 1 year, per patient " preference, or follow-up sooner for any issues or concerns.  Patient's questions were answered.          Thank you for allowing me to participate in the care of this patient.     Paty Bailey DNP, APRN  [facility]

## 2024-03-05 NOTE — PROGRESS NOTES
Chief Complaint: Colonoscopy    Subjective      Colonoscopy consultation       History of Present Illness  Mere Ivory is a 48 y.o. female presents to [facility] for colonoscopy consultation.    Patient presents today on referral from Shubham Elder for colonoscopy consultation.  Patient was with a positive Cologuard in February 2022.  Patient feels like this result is inaccurate.  Denies any abdominal pain or change in bowel habit.  Denies any rectal bleeding.  Denies any family history of colorectal cancer.  No previous colonoscopy.    Denies BRANDT.  Denies any cardiac issues.  Denies taking a GLP-1 receptors  Objective     Past Medical History:   Diagnosis Date    Achilles tendon rupture, right, subsequent encounter     Acute respiratory failure with hypoxemia 02/28/2023    Arthritis     Arthritis 09/23/2021    Asthma     Asthma 06/13/2023    HTN (hypertension)     Irregular menstrual cycle 04/18/2023    BRANDT on CPAP 06/13/2023    PCOS (polycystic ovarian syndrome)     PONV (postoperative nausea and vomiting)        Past Surgical History:   Procedure Laterality Date    ACHILLES TENDON SURGERY Right 03/18/2022    Procedure: ACHILLES TENDON REPAIR, BONE SPUR EXCISION;  Surgeon: Sergei Russ DPM;  Location: [facility] OR;  Service: Podiatry;  Laterality: Right;    GASTRIC SLEEVE LAPAROSCOPIC N/A 07/17/2023    Procedure: GASTRIC SLEEVE LAPAROSCOPIC WITH ROBOTIC;  Surgeon: Darren Wolfe Jr., MD;  Location: [facility];  Service: Robotics - [facility];  Laterality: N/A;    LAS  2011       Outpatient Medications Marked as Taking for the 3/5/24 encounter (Office Visit) with Nancy Devries, APRARACELI   Medication Sig Dispense Refill    albuterol sulfate  (90 Base) MCG/ACT inhaler Inhale 2 puffs Every 4 (Four) Hours As Needed for Wheezing. 18 g 11    cetirizine (zyrTEC) 10 MG tablet Take 1 tablet by mouth Daily. 90 tablet 3    Fluticasone-Salmeterol  (ADVAIR/WIXELA) 250-50 MCG/ACT DISKUS Inhale 1 puff 2 (Two) Times a Day. Rinse mouth with water and spit 60 each 11    ipratropium-albuterol (DUO-NEB) 0.5-2.5 mg/3 ml nebulizer Take 3 mL by nebulization Every 4 (Four) Hours as needed for shortness of air or wheezing 360 mL 5    losartan (COZAAR) 100 MG tablet Take 1 tablet by mouth Daily. (Patient taking differently: Take 1 tablet by mouth Every Evening. HOLD EVENING DOSE DAY BEFORE SURGERY) 90 tablet 1    montelukast (SINGULAIR) 10 MG tablet Take 1 tablet by mouth Every Night. 90 tablet 3    multivitamin with minerals tablet tablet Take 1 tablet by mouth Daily.      omeprazole (priLOSEC) 20 MG capsule Take 1 capsule by mouth Daily.         No Known Allergies     Family History   Problem Relation Age of Onset    Hypertension Father     Diabetes Father     Diabetes Mother     Hypertension Mother     Obesity Mother     Arthritis Mother     Kidney disease Mother     Stroke Neg Hx     Malig Hyperthermia Neg Hx     Breast cancer Neg Hx     Ovarian cancer Neg Hx     Uterine cancer Neg Hx     Colon cancer Neg Hx     Melanoma Neg Hx     Prostate cancer Neg Hx        Social History     Socioeconomic History    Marital status:     Number of children: 0   Tobacco Use    Smoking status: Former     Current packs/day: 0.00     Average packs/day: 0.3 packs/day for 5.2 years (1.3 ttl pk-yrs)     Types: Cigarettes     Start date: 2007     Quit date: 2012     Years since quittin.1    Smokeless tobacco: Never    Tobacco comments:     QUIT 2012 WAS A SOCIAL SMOKER FOR 18 YEARS   Vaping Use    Vaping status: Never Used   Substance and Sexual Activity    Alcohol use: Not Currently     Comment: RARELY    Drug use: Never    Sexual activity: Yes     Partners: Male     Birth control/protection: None       Review of Systems   Constitutional:  Negative for chills and fever.   Gastrointestinal:  Negative for abdominal distention, abdominal pain, anal bleeding, blood in  "stool, constipation, diarrhea and rectal pain.        Vital Signs:   /83 (BP Location: Right arm, Patient Position: Sitting, Cuff Size: Large Adult)   Pulse 73   Ht 172.7 cm (68\")   Wt 116 kg (256 lb 12.8 oz)   BMI 39.05 kg/m²      Physical Exam  Vitals and nursing note reviewed.   Constitutional:       General: She is not in acute distress.     Appearance: She is obese.   HENT:      Head: Normocephalic.   Cardiovascular:      Rate and Rhythm: Normal rate.   Pulmonary:      Effort: Pulmonary effort is normal.      Breath sounds: No stridor.   Abdominal:      Palpations: Abdomen is soft.      Tenderness: There is no guarding.   Musculoskeletal:         General: No deformity. Normal range of motion.   Skin:     General: Skin is warm and dry.      Coloration: Skin is not jaundiced.   Neurological:      General: No focal deficit present.      Mental Status: She is alert and oriented to person, place, and time.   Psychiatric:         Mood and Affect: Mood normal.         Thought Content: Thought content normal.          Result Review :          []  Laboratory  []  Radiology  []  Pathology  []  Microbiology  []  EKG/Telemetry   []  Cardiology/Vascular   []  Old records  I spent 15 minutes caring for deborah on this date of service. This time includes time spent by me in the following activities: reviewing tests, obtaining and/or reviewing a separately obtained history, performing a medically appropriate examination and/or evaluation, ordering medications, tests, or procedures, and documenting information in the medical record.     Assessment and Plan    Diagnoses and all orders for this visit:    1. Positive colorectal cancer screening using Cologuard test (Primary)    Other orders  -     polyethylene glycol (MIRALAX) 17 g packet; Take as directed.  Instructions given in office.  Dispense: 238 g bottle  Dispense: 238 packet; Refill: 0        Follow Up   Return for Schedule colonoscopy with Dr. Ivory on " 7/25/2024 [facility].    Hospital arrival time: 1300    Possible risks/complications, benefits, and alternatives to surgical or invasive procedures have been explained to patient and/or legal guardian.    Patient has been evaluated and can tolerate anesthesia and/or sedation. Risks, benefits, and alternatives to anesthesia and sedation have been explained to the patient and/or legal guardian. Patient verbalizes understanding and is willing to proceed with the above plan.     Patient was given instructions and counseling regarding her condition or for health maintenance advice. Please see specific information pulled into the AVS if appropriate.

## 2024-03-07 ENCOUNTER — TRANSCRIBE ORDERS (OUTPATIENT)
Dept: ADMINISTRATIVE | Facility: HOSPITAL | Age: 49
End: 2024-03-07
Payer: COMMERCIAL

## 2024-03-07 DIAGNOSIS — Z13.9 SCREENING DUE: Primary | ICD-10-CM

## 2024-03-11 DIAGNOSIS — G47.33 OSA (OBSTRUCTIVE SLEEP APNEA): ICD-10-CM

## 2024-03-11 DIAGNOSIS — J96.01 ACUTE RESPIRATORY FAILURE WITH HYPOXEMIA: ICD-10-CM

## 2024-03-12 RX ORDER — MONTELUKAST SODIUM 10 MG/1
10 TABLET ORAL NIGHTLY
Qty: 90 TABLET | Refills: 3 | Status: SHIPPED | OUTPATIENT
Start: 2024-03-12

## 2024-03-27 ENCOUNTER — TRANSCRIBE ORDERS (OUTPATIENT)
Dept: ADMINISTRATIVE | Facility: HOSPITAL | Age: 49
End: 2024-03-27
Payer: COMMERCIAL

## 2024-03-28 ENCOUNTER — OFFICE VISIT (OUTPATIENT)
Dept: FAMILY MEDICINE CLINIC | Facility: CLINIC | Age: 49
End: 2024-03-28
Payer: COMMERCIAL

## 2024-03-28 VITALS
TEMPERATURE: 97.2 F | OXYGEN SATURATION: 99 % | HEIGHT: 68 IN | WEIGHT: 263.4 LBS | RESPIRATION RATE: 18 BRPM | BODY MASS INDEX: 39.92 KG/M2 | SYSTOLIC BLOOD PRESSURE: 122 MMHG | HEART RATE: 83 BPM | DIASTOLIC BLOOD PRESSURE: 78 MMHG

## 2024-03-28 DIAGNOSIS — E78.5 BORDERLINE HYPERLIPIDEMIA: ICD-10-CM

## 2024-03-28 DIAGNOSIS — I10 HYPERTENSION, UNSPECIFIED TYPE: ICD-10-CM

## 2024-03-28 DIAGNOSIS — E66.9 OBESITY, CLASS II, BMI 35-39.9: ICD-10-CM

## 2024-03-28 DIAGNOSIS — E28.2 PCOS (POLYCYSTIC OVARIAN SYNDROME): ICD-10-CM

## 2024-03-28 DIAGNOSIS — R12 HEARTBURN: ICD-10-CM

## 2024-03-28 DIAGNOSIS — Z00.00 ANNUAL PHYSICAL EXAM: Primary | ICD-10-CM

## 2024-03-28 DIAGNOSIS — R73.01 IMPAIRED FASTING GLUCOSE: ICD-10-CM

## 2024-03-28 DIAGNOSIS — I10 PRIMARY HYPERTENSION: ICD-10-CM

## 2024-03-28 LAB
AMPHET+METHAMPHET UR QL: NEGATIVE
AMPHETAMINE INTERNAL CONTROL: NORMAL
AMPHETAMINES UR QL: NEGATIVE
BARBITURATE INTERNAL CONTROL: NORMAL
BARBITURATES UR QL SCN: NEGATIVE
BENZODIAZ UR QL SCN: NEGATIVE
BENZODIAZEPINE INTERNAL CONTROL: NORMAL
BUPRENORPHINE INTERNAL CONTROL: NORMAL
BUPRENORPHINE SERPL-MCNC: NEGATIVE NG/ML
CANNABINOIDS SERPL QL: NEGATIVE
COCAINE INTERNAL CONTROL: NORMAL
COCAINE UR QL: NEGATIVE
EXPIRATION DATE: NORMAL
Lab: NORMAL
MDMA (ECSTASY) INTERNAL CONTROL: NORMAL
MDMA UR QL SCN: NEGATIVE
METHADONE INTERNAL CONTROL: NORMAL
METHADONE UR QL SCN: NEGATIVE
METHAMPHETAMINE INTERNAL CONTROL: NORMAL
MORPHINE INTERNAL CONTROL: NORMAL
MORPHINE/OPIATES SCREEN, URINE: NEGATIVE
OXYCODONE INTERNAL CONTROL: NORMAL
OXYCODONE UR QL SCN: NEGATIVE
PCP UR QL SCN: NEGATIVE
PHENCYCLIDINE INTERNAL CONTROL: NORMAL
THC INTERNAL CONTROL: NORMAL

## 2024-03-28 RX ORDER — LOSARTAN POTASSIUM 100 MG/1
100 TABLET ORAL DAILY
Qty: 90 TABLET | Refills: 1 | Status: SHIPPED | OUTPATIENT
Start: 2024-03-28

## 2024-03-28 RX ORDER — PHENTERMINE HYDROCHLORIDE 37.5 MG/1
37.5 CAPSULE ORAL EVERY MORNING
Qty: 30 CAPSULE | Refills: 1 | Status: SHIPPED | OUTPATIENT
Start: 2024-03-28

## 2024-03-28 NOTE — PROGRESS NOTES
Answers submitted by the patient for this visit:  Primary Reason for Visit (Submitted on 3/27/2024)  What is the primary reason for your visit?: High Blood Pressure  Chief Complaint  Depression, Anxiety, Hypertension, Annual Exam, Asthma (Was hospitalized last year for hypoxemia), and Sleep Apnea    Subjective        Mere Ivory presents to [facility]  History of Present Illness  Depression:  Doing better than she was.  The feeling of wanting to jump out of skin is gone.  But still having some symptoms.      Hypertension:  Doing well but blood pressure up a little but had some coffee before coming in.  122/78.    Annual exam:  doing well. 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    Sleep apnea:  doing well using Cpap.    Asthma:  doing well but had a hospitalized    Bariatric surgery last July and is gaining weight back.    Hypertension  This is a recurrent problem. The current episode started more than 1 year ago. The problem is unchanged. The problem is controlled. Associated symptoms include anxiety. Pertinent negatives include no blurred vision, chest pain, headaches, malaise/fatigue, orthopnea, palpitations, peripheral edema or shortness of breath. There are no associated agents to hypertension. Risk factors for coronary artery disease include obesity. Compliance problems include diet and exercise.  Identifiable causes of hypertension include sleep apnea.   Additional comments: Would like to restart previous medication, losartan and hydrochlorothiazide if possible      The following portions of the patient's history were personally reviewed and updated as appropriate: allergies, current medications, past medical history, past surgical history, past family history, and past social history.     Body mass index is 40.06 kg/m².           Past History:    Medical History: has a past medical history of Achilles tendon rupture, right, subsequent encounter, Acute respiratory failure with  hypoxemia (02/28/2023), Arthritis, Arthritis (09/23/2021), Asthma, Asthma (06/13/2023), HTN (hypertension), Irregular menstrual cycle (04/18/2023), BRANDT on CPAP (06/13/2023), PCOS (polycystic ovarian syndrome), and PONV (postoperative nausea and vomiting).     Surgical History: has a past surgical history that includes LASIK (2011); Achilles tendon surgery (Right, 03/18/2022); and Gastric Sleeve (N/A, 07/17/2023).     Family History: family history includes Arthritis in her mother; Diabetes in her father and mother; Hypertension in her father and mother; Kidney disease in her mother; Obesity in her mother.     Social History: reports that she quit smoking about 12 years ago. Her smoking use included cigarettes. She started smoking about 17 years ago. She has a 1.3 pack-year smoking history. She has never used smokeless tobacco. She reports that she does not currently use alcohol. She reports that she does not use drugs.    Allergies: Patient has no known allergies.          Current Outpatient Medications:     albuterol sulfate  (90 Base) MCG/ACT inhaler, Inhale 2 puffs Every 4 (Four) Hours As Needed for Wheezing., Disp: 18 g, Rfl: 11    cetirizine (zyrTEC) 10 MG tablet, Take 1 tablet by mouth Daily., Disp: 90 tablet, Rfl: 3    Fluticasone-Salmeterol (ADVAIR/WIXELA) 250-50 MCG/ACT DISKUS, Inhale 1 puff 2 (Two) Times a Day. Rinse mouth with water and spit, Disp: 60 each, Rfl: 11    ipratropium-albuterol (DUO-NEB) 0.5-2.5 mg/3 ml nebulizer, Take 3 mL by nebulization Every 4 (Four) Hours as needed for shortness of air or wheezing, Disp: 360 mL, Rfl: 5    losartan (COZAAR) 100 MG tablet, Take 1 tablet by mouth Daily., Disp: 90 tablet, Rfl: 1    montelukast (SINGULAIR) 10 MG tablet, Take 1 tablet by mouth Every Night., Disp: 90 tablet, Rfl: 3    multivitamin with minerals tablet tablet, Take 1 tablet by mouth Daily., Disp: , Rfl:     omeprazole (priLOSEC) 20 MG capsule, Take 1 capsule by mouth Daily., Disp: , Rfl:  "    polyethylene glycol (MIRALAX) 17 GM/SCOOP powder, Take as directed.  Instructions given in office.  Dispense: 238 g bottle, Disp: 238 g, Rfl: 0    ipratropium-albuterol (DUO-NEB) 0.5-2.5 mg/3 ml nebulizer, Take 3 mL by nebulization Every 4 (Four) Hours As Needed for Shortness of Air or Wheezing for up to 30 days. (Patient taking differently: Take 3 mL by nebulization As Needed for Shortness of Air or Wheezing.), Disp: 360 mL, Rfl: 5    phentermine 37.5 MG capsule, Take 1 capsule by mouth Every Morning., Disp: 30 capsule, Rfl: 1    Medications Discontinued During This Encounter   Medication Reason    losartan (COZAAR) 100 MG tablet Reorder         Review of Systems   Constitutional:  Negative for malaise/fatigue.   Eyes:  Negative for blurred vision.   Respiratory:  Negative for shortness of breath.    Cardiovascular:  Negative for chest pain, palpitations and orthopnea.        Objective         Vitals:    03/28/24 0837   BP: 122/78   BP Location: Right arm   Patient Position: Sitting   Cuff Size: Large Adult   Pulse: 83   Resp: 18   Temp: 97.2 °F (36.2 °C)   TempSrc: Temporal   SpO2: 99%   Weight: 119 kg (263 lb 6.4 oz)   Height: 172.7 cm (67.99\")     Body mass index is 40.06 kg/m².         Physical Exam  Vitals reviewed.   Constitutional:       Appearance: Normal appearance. She is well-developed.   HENT:      Head: Normocephalic and atraumatic.      Mouth/Throat:      Pharynx: No oropharyngeal exudate.   Eyes:      Conjunctiva/sclera: Conjunctivae normal.      Pupils: Pupils are equal, round, and reactive to light.   Cardiovascular:      Rate and Rhythm: Normal rate and regular rhythm.      Heart sounds: Normal heart sounds. No murmur heard.     No friction rub. No gallop.   Pulmonary:      Effort: Pulmonary effort is normal.      Breath sounds: Normal breath sounds. No wheezing or rhonchi.   Skin:     General: Skin is warm and dry.   Neurological:      Mental Status: She is alert and oriented to person, " place, and time.   Psychiatric:         Mood and Affect: Mood and affect normal.         Behavior: Behavior normal.         Thought Content: Thought content normal.         Judgment: Judgment normal.             Result Review :               Assessment and Plan     Diagnoses and all orders for this visit:    1. Annual physical exam (Primary)  Comments:  discussed diet and exercise  Orders:  -     Comprehensive Metabolic Panel; Future  -     Lipid Panel With / Chol / HDL Ratio; Future  -     Urinalysis With Culture If Indicated -; Future  -     CBC & Differential; Future  -     TSH Rfx On Abnormal To Free T4; Future    2. Primary hypertension    3. Borderline hyperlipidemia    4. Obesity, Class II, BMI 35-39.9  -     POC Medline 12 Panel Urine Drug Screen  -     phentermine 37.5 MG capsule; Take 1 capsule by mouth Every Morning.  Dispense: 30 capsule; Refill: 1    5. PCOS (polycystic ovarian syndrome)    6. Heartburn    7. Impaired fasting glucose  -     Hemoglobin A1c; Future    8. Hypertension, unspecified type  -     losartan (COZAAR) 100 MG tablet; Take 1 tablet by mouth Daily.  Dispense: 90 tablet; Refill: 1              Follow Up     Return in about 1 month (around 4/28/2024).    Patient was given instructions and counseling regarding her condition or for health maintenance advice. Please see specific information pulled into the AVS if appropriate.

## 2024-03-29 DIAGNOSIS — G47.33 OSA (OBSTRUCTIVE SLEEP APNEA): ICD-10-CM

## 2024-03-29 DIAGNOSIS — J96.01 ACUTE RESPIRATORY FAILURE WITH HYPOXEMIA: ICD-10-CM

## 2024-03-29 RX ORDER — FLUTICASONE PROPIONATE AND SALMETEROL 250; 50 UG/1; UG/1
1 POWDER RESPIRATORY (INHALATION)
Qty: 60 EACH | Refills: 11 | Status: SHIPPED | OUTPATIENT
Start: 2024-03-29

## 2024-04-16 ENCOUNTER — OFFICE VISIT (OUTPATIENT)
Dept: BARIATRICS/WEIGHT MGMT | Facility: CLINIC | Age: 49
End: 2024-04-16
Payer: COMMERCIAL

## 2024-04-16 VITALS
WEIGHT: 253 LBS | BODY MASS INDEX: 38.34 KG/M2 | SYSTOLIC BLOOD PRESSURE: 140 MMHG | DIASTOLIC BLOOD PRESSURE: 90 MMHG | HEIGHT: 68 IN | TEMPERATURE: 97.4 F | HEART RATE: 80 BPM

## 2024-04-16 DIAGNOSIS — E66.9 OBESITY, CLASS II, BMI 35-39.9: Primary | ICD-10-CM

## 2024-04-16 DIAGNOSIS — Z71.3 DIETARY COUNSELING: ICD-10-CM

## 2024-04-16 DIAGNOSIS — Z98.84 S/P LAPAROSCOPIC SLEEVE GASTRECTOMY: ICD-10-CM

## 2024-04-16 PROCEDURE — 99213 OFFICE O/P EST LOW 20 MIN: CPT | Performed by: NURSE PRACTITIONER

## 2024-04-16 NOTE — PROGRESS NOTES
[facility]  [address]  [facility] [id number] [phone]  [address]  [facility] [id number] [phone]  Dept: [phone]  4/16/2024      Mere Ivory.  [id number]  [id number]  1975  female      Chief Complaint   Patient presents with    Follow-up     9 mo fup sleeve       BH Post-Op Bariatric Surgery:   Mere Ivory is status post Laparoscopic Sleeve procedure, performed on 7/17/2023     HPI:   Today's weight is 115 kg (253 lb) pounds, today's BMI is Body mass index is 38.48 kg/m²., has a  gain of 3 pounds since the last visit and weight loss since surgery is 104 pounds. The patient reports a decreased portion size and loss of appetite.      Mere Ivory denies nausea, vomiting, reflux, dysphagia and reports tolerating diet.  Portion size has gotten a little bigger.  Feels like eats a little past where she feels full.  Not eating as well as she was.  Has eaten more junk foods.  Pcp rx phentermine.  Just started taking last week.  Eats most of intake from 12-3p.  Snack around 6p.  Worried not be able to eat at work.  So eats prior to going to work.  Then snacks while at work on crackers, pb crackers, fried foods during nite at hospital.    B: shake with coffee  S: protein bar, breakfast sandwich (occasionally mcdonalds muffin with egg), eggs with perkins  L: sandwiches     Diet and Exercise: Diet history reviewed and discussed with the patient. Weight loss/gains to date discussed with the patient. The patient states they are eating 80 grams of protein per day. She reports eating 2 meals per day, a typical portion size of 1/2 cup, eating 4 snacks per day, drinking 5 or more 8-oz. glasses of water per day, no carbonated beverage consumption and exercising regularly.     Supplements: bmtv.     Review of Systems   Constitutional:  Positive for activity change, appetite change and  unexpected weight change.   Respiratory:  Negative for shortness of breath.    Cardiovascular:  Negative for chest pain.   All other systems reviewed and are negative.      Patient Active Problem List   Diagnosis    Arthritis    Hypertension    Dietary counseling    Multiple joint pain    Snoring    Heartburn    PCOS (polycystic ovarian syndrome)    Borderline hyperlipidemia    Asthma    BRANDT on CPAP    S/P laparoscopic sleeve gastrectomy    Chronic fatigue    Obesity, Class II, BMI 35-39.9    Positive colorectal cancer screening using Cologuard test       Past Medical History:   Diagnosis Date    Achilles tendon rupture, right, subsequent encounter     Acute respiratory failure with hypoxemia 02/28/2023    Arthritis     Arthritis 09/23/2021    Asthma     Asthma 06/13/2023    HTN (hypertension)     Irregular menstrual cycle 04/18/2023    BRANDT on CPAP 06/13/2023    PCOS (polycystic ovarian syndrome)     PONV (postoperative nausea and vomiting)        The following portions of the patient's history were reviewed and updated as appropriate: allergies, current medications, past medical history, past surgical history, and problem list.    Vitals:    04/16/24 1048   BP: 140/90   Pulse: 80   Temp: 97.4 °F (36.3 °C)       Physical Exam  Vitals reviewed.   Constitutional:       General: She is not in acute distress.     Appearance: Normal appearance. She is morbidly obese and obese.   HENT:      Head: Normocephalic and atraumatic.      Mouth/Throat:      Mouth: Mucous membranes are moist.      Pharynx: Oropharynx is clear.   Eyes:      General: No scleral icterus.     Extraocular Movements: Extraocular movements intact.      Conjunctiva/sclera: Conjunctivae normal.      Pupils: Pupils are equal, round, and reactive to light.   Cardiovascular:      Rate and Rhythm: Normal rate and regular rhythm.   Pulmonary:      Effort: Pulmonary effort is normal. No respiratory distress.   Abdominal:      General: Bowel sounds are normal.       Palpations: Abdomen is soft.   Musculoskeletal:         General: Normal range of motion.      Cervical back: Normal range of motion and neck supple.   Skin:     General: Skin is warm and dry.   Neurological:      General: No focal deficit present.      Mental Status: She is alert and oriented to person, place, and time.   Psychiatric:         Mood and Affect: Mood normal.         Behavior: Behavior normal.         Thought Content: Thought content normal.         Judgment: Judgment normal.         Assessment:   Post-op, the patient is struggling with weight regain and increasing hunger and portion size.     Encounter Diagnoses   Name Primary?    Obesity, Class II, BMI 35-39.9 Yes    S/P laparoscopic sleeve gastrectomy     Dietary counseling        Plan:   Will stop phentermine and try zepbound  Did review side effects, administration.  Encouraged patient to be sure to get plenty of lean protein per day through small frequent meals all with a protein source.   Activity restrictions: none.   Recommended patient be sure to get at least 70 grams of protein per day by eating small, frequent meals all with high lean protein choices. Be sure to limit/cut back on daily carbohydrate intake. Discussed with the patient the recommended amount of water per day to intake- half of body weight in ounces. Reviewed vitamin requirements. Be sure to do routine exercise, 150 minutes per week minimum, including both cardio and strength training.     Instructions / Recommendations: dietary counseling recommended, recommended a daily protein intake of  grams, vitamin supplement(s) recommended, recommended exercising at least 150 minutes per week, behavior modifications recommended and instructed to call the office for concerns, questions, or problems.     The patient was instructed to follow up in 3 months.     Total time spent during this encounter today was 25 minutes

## 2024-04-24 ENCOUNTER — LAB (OUTPATIENT)
Dept: LAB | Facility: HOSPITAL | Age: 49
End: 2024-04-24
Payer: COMMERCIAL

## 2024-04-24 DIAGNOSIS — R73.01 IMPAIRED FASTING GLUCOSE: ICD-10-CM

## 2024-04-24 DIAGNOSIS — Z00.00 ANNUAL PHYSICAL EXAM: ICD-10-CM

## 2024-04-24 LAB
ALBUMIN SERPL-MCNC: 4.3 G/DL (ref 3.5–5.2)
ALBUMIN/GLOB SERPL: 1.4 G/DL
ALP SERPL-CCNC: 87 U/L (ref 39–117)
ALT SERPL W P-5'-P-CCNC: 26 U/L (ref 1–33)
ANION GAP SERPL CALCULATED.3IONS-SCNC: 8 MMOL/L (ref 5–15)
ANISOCYTOSIS BLD QL: ABNORMAL
AST SERPL-CCNC: 27 U/L (ref 1–32)
BACTERIA UR QL AUTO: ABNORMAL /HPF
BASOPHILS # BLD MANUAL: 0.07 10*3/MM3 (ref 0–0.2)
BASOPHILS NFR BLD MANUAL: 1.1 % (ref 0–1.5)
BILIRUB SERPL-MCNC: 0.8 MG/DL (ref 0–1.2)
BILIRUB UR QL STRIP: NEGATIVE
BUN SERPL-MCNC: 18 MG/DL (ref 6–20)
BUN/CREAT SERPL: 24.7 (ref 7–25)
CALCIUM SPEC-SCNC: 9.6 MG/DL (ref 8.6–10.5)
CHLORIDE SERPL-SCNC: 104 MMOL/L (ref 98–107)
CHOLEST SERPL-MCNC: 166 MG/DL (ref 0–200)
CLARITY UR: CLEAR
CO2 SERPL-SCNC: 29 MMOL/L (ref 22–29)
COLOR UR: YELLOW
CREAT SERPL-MCNC: 0.73 MG/DL (ref 0.57–1)
DEPRECATED RDW RBC AUTO: 41.9 FL (ref 37–54)
EGFRCR SERPLBLD CKD-EPI 2021: 100.3 ML/MIN/1.73
EOSINOPHIL # BLD MANUAL: 1.48 10*3/MM3 (ref 0–0.4)
EOSINOPHIL NFR BLD MANUAL: 23.4 % (ref 0.3–6.2)
ERYTHROCYTE [DISTWIDTH] IN BLOOD BY AUTOMATED COUNT: 13.8 % (ref 12.3–15.4)
GLOBULIN UR ELPH-MCNC: 3.1 GM/DL
GLUCOSE SERPL-MCNC: 85 MG/DL (ref 65–99)
GLUCOSE UR STRIP-MCNC: NEGATIVE MG/DL
HBA1C MFR BLD: 5 % (ref 4.8–5.6)
HCT VFR BLD AUTO: 36.8 % (ref 34–46.6)
HDLC SERPL QL: 3.39
HDLC SERPL-MCNC: 49 MG/DL (ref 40–60)
HGB BLD-MCNC: 12.3 G/DL (ref 12–15.9)
HGB UR QL STRIP.AUTO: NEGATIVE
HOLD SPECIMEN: NORMAL
HYALINE CASTS UR QL AUTO: ABNORMAL /LPF
KETONES UR QL STRIP: NEGATIVE
LDLC SERPL CALC-MCNC: 101 MG/DL (ref 0–100)
LEUKOCYTE ESTERASE UR QL STRIP.AUTO: ABNORMAL
LYMPHOCYTES # BLD MANUAL: 1.48 10*3/MM3 (ref 0.7–3.1)
LYMPHOCYTES NFR BLD MANUAL: 2.1 % (ref 5–12)
MCH RBC QN AUTO: 27.8 PG (ref 26.6–33)
MCHC RBC AUTO-ENTMCNC: 33.4 G/DL (ref 31.5–35.7)
MCV RBC AUTO: 83.3 FL (ref 79–97)
MICROCYTES BLD QL: ABNORMAL
MONOCYTES # BLD: 0.13 10*3/MM3 (ref 0.1–0.9)
NEUTROPHILS # BLD AUTO: 3.16 10*3/MM3 (ref 1.7–7)
NEUTROPHILS NFR BLD MANUAL: 50 % (ref 42.7–76)
NITRITE UR QL STRIP: NEGATIVE
NRBC BLD AUTO-RTO: 0 /100 WBC (ref 0–0.2)
PH UR STRIP.AUTO: 7 [PH] (ref 5–8)
PLAT MORPH BLD: NORMAL
PLATELET # BLD AUTO: 225 10*3/MM3 (ref 140–450)
PMV BLD AUTO: 10.8 FL (ref 6–12)
POTASSIUM SERPL-SCNC: 4.6 MMOL/L (ref 3.5–5.2)
PROT SERPL-MCNC: 7.4 G/DL (ref 6–8.5)
PROT UR QL STRIP: NEGATIVE
RBC # BLD AUTO: 4.42 10*6/MM3 (ref 3.77–5.28)
RBC # UR STRIP: ABNORMAL /HPF
REF LAB TEST METHOD: ABNORMAL
SMUDGE CELLS BLD QL SMEAR: ABNORMAL
SODIUM SERPL-SCNC: 141 MMOL/L (ref 136–145)
SP GR UR STRIP: 1.02 (ref 1–1.03)
SQUAMOUS #/AREA URNS HPF: ABNORMAL /HPF
TRIGL SERPL-MCNC: 88 MG/DL (ref 0–150)
TSH SERPL DL<=0.05 MIU/L-ACNC: 1.36 UIU/ML (ref 0.27–4.2)
UROBILINOGEN UR QL STRIP: ABNORMAL
VARIANT LYMPHS NFR BLD MANUAL: 23.4 % (ref 19.6–45.3)
VLDLC SERPL-MCNC: 16 MG/DL (ref 5–40)
WBC # UR STRIP: ABNORMAL /HPF
WBC NRBC COR # BLD AUTO: 6.31 10*3/MM3 (ref 3.4–10.8)

## 2024-04-24 PROCEDURE — 80050 GENERAL HEALTH PANEL: CPT

## 2024-04-24 PROCEDURE — 36415 COLL VENOUS BLD VENIPUNCTURE: CPT

## 2024-04-24 PROCEDURE — 83036 HEMOGLOBIN GLYCOSYLATED A1C: CPT

## 2024-04-24 PROCEDURE — 85007 BL SMEAR W/DIFF WBC COUNT: CPT

## 2024-04-24 PROCEDURE — 80061 LIPID PANEL: CPT

## 2024-04-24 PROCEDURE — 81001 URINALYSIS AUTO W/SCOPE: CPT

## 2024-04-24 PROCEDURE — 87086 URINE CULTURE/COLONY COUNT: CPT

## 2024-04-25 LAB — BACTERIA SPEC AEROBE CULT: NO GROWTH

## 2024-04-29 ENCOUNTER — OFFICE VISIT (OUTPATIENT)
Dept: FAMILY MEDICINE CLINIC | Facility: CLINIC | Age: 49
End: 2024-04-29
Payer: COMMERCIAL

## 2024-04-29 VITALS
SYSTOLIC BLOOD PRESSURE: 118 MMHG | HEIGHT: 68 IN | HEART RATE: 83 BPM | TEMPERATURE: 96.7 F | OXYGEN SATURATION: 98 % | WEIGHT: 253.6 LBS | RESPIRATION RATE: 16 BRPM | BODY MASS INDEX: 38.43 KG/M2 | DIASTOLIC BLOOD PRESSURE: 64 MMHG

## 2024-04-29 DIAGNOSIS — G89.29 CHRONIC PAIN OF BOTH KNEES: Primary | ICD-10-CM

## 2024-04-29 DIAGNOSIS — E66.9 OBESITY, CLASS II, BMI 35-39.9: ICD-10-CM

## 2024-04-29 DIAGNOSIS — M25.562 CHRONIC PAIN OF BOTH KNEES: Primary | ICD-10-CM

## 2024-04-29 DIAGNOSIS — M25.561 CHRONIC PAIN OF BOTH KNEES: Primary | ICD-10-CM

## 2024-04-29 PROCEDURE — 99213 OFFICE O/P EST LOW 20 MIN: CPT | Performed by: NURSE PRACTITIONER

## 2024-04-29 RX ORDER — PHENTERMINE HYDROCHLORIDE 37.5 MG/1
37.5 CAPSULE ORAL EVERY MORNING
Qty: 30 CAPSULE | Refills: 1 | Status: SHIPPED | OUTPATIENT
Start: 2024-04-29

## 2024-04-29 NOTE — PROGRESS NOTES
Answers submitted by the patient for this visit:  Other (Submitted on 4/29/2024)  Please describe your symptoms.: Follow up 1 month after starting phentermine. Also, would like opinion on biltaral knee pain, injections?  Have you had these symptoms before?: Yes  How long have you been having these symptoms?: Greater than 2 weeks  Please list any medications you are currently taking for this condition.: Ibuprofen when bad  Please describe any probable cause for these symptoms. : Arthritis, wear and tear.  Primary Reason for Visit (Submitted on 4/29/2024)  What is the primary reason for your visit?: Other  Chief Complaint  Obesity    Subjective        Mere Ivory presents to [facility]  History of Present Illness  Obesity:  doing well on medication.  Has lost 10 pounds.  Changes are eating less and drinking more water. Denies chest pain or shortness of breath.    Has had knee pain for a long time. Both knees.  Is considering  about injections.  Obesity  Pertinent negatives include no chest pain, coughing, fever, numbness or weakness.       The following portions of the patient's history were personally reviewed and updated as appropriate: allergies, current medications, past medical history, past surgical history, past family history, and past social history.     Body mass index is 38.57 kg/m².           Past History:    Medical History: has a past medical history of Achilles tendon rupture, right, subsequent encounter, Acute respiratory failure with hypoxemia (02/28/2023), Arthritis, Arthritis (09/23/2021), Asthma, Asthma (06/13/2023), HTN (hypertension), Irregular menstrual cycle (04/18/2023), BRANDT on CPAP (06/13/2023), PCOS (polycystic ovarian syndrome), and PONV (postoperative nausea and vomiting).     Surgical History: has a past surgical history that includes LASIK (2011); Achilles tendon surgery (Right, 03/18/2022); Gastric Sleeve (N/A, 07/17/2023); and Bariatric  Surgery (07/2023).     Family History: family history includes Arthritis in her mother; Diabetes in her father and mother; Hypertension in her father and mother; Kidney disease in her mother; Obesity in her mother.     Social History: reports that she quit smoking about 12 years ago. Her smoking use included cigarettes. She started smoking about 17 years ago. She has a 1.3 pack-year smoking history. She has never used smokeless tobacco. She reports that she does not currently use alcohol. She reports that she does not use drugs.    Allergies: Patient has no known allergies.          Current Outpatient Medications:     albuterol sulfate  (90 Base) MCG/ACT inhaler, Inhale 2 puffs Every 4 (Four) Hours As Needed for Wheezing., Disp: 18 g, Rfl: 11    cetirizine (zyrTEC) 10 MG tablet, Take 1 tablet by mouth Daily., Disp: 90 tablet, Rfl: 3    Fluticasone-Salmeterol (ADVAIR/WIXELA) 250-50 MCG/ACT DISKUS, Inhale 1 puff 2 (Two) Times a Day. Rinse mouth with water and spit, Disp: 60 each, Rfl: 11    ipratropium-albuterol (DUO-NEB) 0.5-2.5 mg/3 ml nebulizer, Take 3 mL by nebulization Every 4 (Four) Hours as needed for shortness of air or wheezing, Disp: 360 mL, Rfl: 5    losartan (COZAAR) 100 MG tablet, Take 1 tablet by mouth Daily., Disp: 90 tablet, Rfl: 1    montelukast (SINGULAIR) 10 MG tablet, Take 1 tablet by mouth Every Night., Disp: 90 tablet, Rfl: 3    multivitamin with minerals tablet tablet, Take 1 tablet by mouth Daily., Disp: , Rfl:     omeprazole (priLOSEC) 20 MG capsule, Take 1 capsule by mouth Daily., Disp: , Rfl:     phentermine 37.5 MG capsule, Take 1 capsule by mouth Every Morning., Disp: 30 capsule, Rfl: 1    polyethylene glycol (MIRALAX) 17 GM/SCOOP powder, Take as directed.  Instructions given in office.  Dispense: 238 g bottle, Disp: 238 g, Rfl: 0    ipratropium-albuterol (DUO-NEB) 0.5-2.5 mg/3 ml nebulizer, Take 3 mL by nebulization Every 4 (Four) Hours As Needed for Shortness of Air or Wheezing  "for up to 30 days. (Patient taking differently: Take 3 mL by nebulization As Needed for Shortness of Air or Wheezing.), Disp: 360 mL, Rfl: 5    Tirzepatide-Weight Management (ZEPBOUND) 2.5 MG/0.5ML solution auto-injector, Inject 0.5 mL under the skin into the appropriate area as directed 1 (One) Time Per Week. (Patient not taking: Reported on 4/29/2024), Disp: 2 mL, Rfl: 0    Tirzepatide-Weight Management (ZEPBOUND) 5 MG/0.5ML solution auto-injector, Inject 0.5 mL under the skin into the appropriate area as directed 1 (One) Time Per Week. (Patient not taking: Reported on 4/29/2024), Disp: 2 mL, Rfl: 2    Medications Discontinued During This Encounter   Medication Reason    phentermine 37.5 MG capsule Reorder         Review of Systems   Constitutional:  Negative for fever.   Respiratory:  Negative for cough and shortness of breath.    Cardiovascular:  Negative for chest pain, palpitations and leg swelling.   Neurological:  Negative for dizziness, weakness, numbness and headache.        Objective         Vitals:    04/29/24 0905   BP: 118/64   BP Location: Right arm   Patient Position: Sitting   Cuff Size: Large Adult   Pulse: 83   Resp: 16   Temp: 96.7 °F (35.9 °C)   TempSrc: Temporal   SpO2: 98%   Weight: 115 kg (253 lb 9.6 oz)   Height: 172.7 cm (67.99\")     Body mass index is 38.57 kg/m².         Physical Exam  Vitals reviewed.   Constitutional:       Appearance: Normal appearance. She is well-developed.   HENT:      Head: Normocephalic and atraumatic.      Mouth/Throat:      Pharynx: No oropharyngeal exudate.   Eyes:      Conjunctiva/sclera: Conjunctivae normal.      Pupils: Pupils are equal, round, and reactive to light.   Cardiovascular:      Rate and Rhythm: Normal rate and regular rhythm.      Heart sounds: Normal heart sounds. No murmur heard.     No friction rub. No gallop.   Pulmonary:      Effort: Pulmonary effort is normal.      Breath sounds: Normal breath sounds. No wheezing or rhonchi.   Skin:     " General: Skin is warm and dry.   Neurological:      Mental Status: She is alert and oriented to person, place, and time.   Psychiatric:         Mood and Affect: Mood and affect normal.         Behavior: Behavior normal.         Thought Content: Thought content normal.         Judgment: Judgment normal.             Result Review :               Assessment and Plan     Diagnoses and all orders for this visit:    1. Chronic pain of both knees (Primary)    2. Obesity, Class II, BMI 35-39.9  -     phentermine 37.5 MG capsule; Take 1 capsule by mouth Every Morning.  Dispense: 30 capsule; Refill: 1              Follow Up     No follow-ups on file.    Patient was given instructions and counseling regarding her condition or for health maintenance advice. Please see specific information pulled into the AVS if appropriate.

## 2024-05-06 ENCOUNTER — HOSPITAL ENCOUNTER (OUTPATIENT)
Dept: MAMMOGRAPHY | Facility: HOSPITAL | Age: 49
Discharge: HOME | End: 2024-05-06
Admitting: NURSE PRACTITIONER
Payer: COMMERCIAL

## 2024-05-06 DIAGNOSIS — Z13.9 SCREENING DUE: ICD-10-CM

## 2024-05-06 PROCEDURE — 77067 SCR MAMMO BI INCL CAD: CPT

## 2024-05-06 PROCEDURE — 77063 BREAST TOMOSYNTHESIS BI: CPT

## 2024-05-29 ENCOUNTER — OFFICE VISIT (OUTPATIENT)
Dept: FAMILY MEDICINE CLINIC | Facility: CLINIC | Age: 49
End: 2024-05-29
Payer: COMMERCIAL

## 2024-05-29 VITALS
HEART RATE: 94 BPM | RESPIRATION RATE: 16 BRPM | SYSTOLIC BLOOD PRESSURE: 120 MMHG | BODY MASS INDEX: 37.04 KG/M2 | DIASTOLIC BLOOD PRESSURE: 80 MMHG | OXYGEN SATURATION: 99 % | HEIGHT: 68 IN | TEMPERATURE: 97.4 F | WEIGHT: 244.4 LBS

## 2024-05-29 DIAGNOSIS — E66.9 OBESITY, CLASS II, BMI 35-39.9: ICD-10-CM

## 2024-05-29 PROCEDURE — 99213 OFFICE O/P EST LOW 20 MIN: CPT | Performed by: NURSE PRACTITIONER

## 2024-05-29 RX ORDER — PHENTERMINE HYDROCHLORIDE 37.5 MG/1
37.5 CAPSULE ORAL EVERY MORNING
Qty: 30 CAPSULE | Refills: 1 | Status: SHIPPED | OUTPATIENT
Start: 2024-05-29

## 2024-05-29 NOTE — PROGRESS NOTES
Answers submitted by the patient for this visit:  Other (Submitted on 5/29/2024)  Please describe your symptoms.: Follow up: weight loss on phentermine  Have you had these symptoms before?: Yes  How long have you been having these symptoms?: Greater than 2 weeks  Please list any medications you are currently taking for this condition.: Phentermine  Primary Reason for Visit (Submitted on 5/29/2024)  What is the primary reason for your visit?: Other  Chief Complaint  Obesity    Subjective        Mere Ivory presents to [facility]  History of Present Illness  Obesity:  has lost 9 pounds in the last month.  States is trying to get protein and drinking water.  Denies chest pain or shortness of breath.  Obesity  Pertinent negatives include no chest pain, coughing, fever, numbness or weakness.       The following portions of the patient's history were personally reviewed and updated as appropriate: allergies, current medications, past medical history, past surgical history, past family history, and past social history.     Body mass index is 37.17 kg/m².           Past History:    Medical History: has a past medical history of Achilles tendon rupture, right, subsequent encounter, Acute respiratory failure with hypoxemia (02/28/2023), Arthritis, Arthritis (09/23/2021), Asthma, Asthma (06/13/2023), HTN (hypertension), Irregular menstrual cycle (04/18/2023), BRANDT on CPAP (06/13/2023), PCOS (polycystic ovarian syndrome), and PONV (postoperative nausea and vomiting).     Surgical History: has a past surgical history that includes LASIK (2011); Achilles tendon surgery (Right, 03/18/2022); Gastric Sleeve (N/A, 07/17/2023); and Bariatric Surgery (07/2023).     Family History: family history includes Arthritis in her mother; Diabetes in her father and mother; Hypertension in her father and mother; Kidney disease in her mother; Obesity in her mother.     Social History: reports that she  quit smoking about 12 years ago. Her smoking use included cigarettes. She started smoking about 17 years ago. She has a 1.3 pack-year smoking history. She has never used smokeless tobacco. She reports that she does not currently use alcohol. She reports that she does not use drugs.    Allergies: Patient has no known allergies.          Current Outpatient Medications:     albuterol sulfate  (90 Base) MCG/ACT inhaler, Inhale 2 puffs Every 4 (Four) Hours As Needed for Wheezing., Disp: 18 g, Rfl: 11    cetirizine (zyrTEC) 10 MG tablet, Take 1 tablet by mouth Daily., Disp: 90 tablet, Rfl: 3    Fluticasone-Salmeterol (ADVAIR/WIXELA) 250-50 MCG/ACT DISKUS, Inhale 1 puff 2 (Two) Times a Day. Rinse mouth with water and spit, Disp: 60 each, Rfl: 11    ipratropium-albuterol (DUO-NEB) 0.5-2.5 mg/3 ml nebulizer, Take 3 mL by nebulization Every 4 (Four) Hours as needed for shortness of air or wheezing, Disp: 360 mL, Rfl: 5    losartan (COZAAR) 100 MG tablet, Take 1 tablet by mouth Daily., Disp: 90 tablet, Rfl: 1    montelukast (SINGULAIR) 10 MG tablet, Take 1 tablet by mouth Every Night., Disp: 90 tablet, Rfl: 3    multivitamin with minerals tablet tablet, Take 1 tablet by mouth Daily., Disp: , Rfl:     omeprazole (priLOSEC) 20 MG capsule, Take 1 capsule by mouth Daily., Disp: , Rfl:     phentermine 37.5 MG capsule, Take 1 capsule by mouth Every Morning., Disp: 30 capsule, Rfl: 1    polyethylene glycol (MIRALAX) 17 GM/SCOOP powder, Take as directed.  Instructions given in office.  Dispense: 238 g bottle, Disp: 238 g, Rfl: 0    Tirzepatide-Weight Management (ZEPBOUND) 5 MG/0.5ML solution auto-injector, Inject 0.5 mL under the skin into the appropriate area as directed 1 (One) Time Per Week., Disp: 2 mL, Rfl: 2    ipratropium-albuterol (DUO-NEB) 0.5-2.5 mg/3 ml nebulizer, Take 3 mL by nebulization Every 4 (Four) Hours As Needed for Shortness of Air or Wheezing for up to 30 days. (Patient taking differently: Take 3 mL by  "nebulization As Needed for Shortness of Air or Wheezing.), Disp: 360 mL, Rfl: 5    Tirzepatide-Weight Management (ZEPBOUND) 2.5 MG/0.5ML solution auto-injector, Inject 0.5 mL under the skin into the appropriate area as directed 1 (One) Time Per Week. (Patient not taking: Reported on 5/29/2024), Disp: 2 mL, Rfl: 0    Medications Discontinued During This Encounter   Medication Reason    phentermine 37.5 MG capsule Reorder         Review of Systems   Constitutional:  Negative for fever.   Respiratory:  Negative for cough and shortness of breath.    Cardiovascular:  Negative for chest pain, palpitations and leg swelling.   Neurological:  Negative for dizziness, weakness, numbness and headache.        Objective         Vitals:    05/29/24 1433   BP: 120/80   BP Location: Right arm   Patient Position: Sitting   Cuff Size: Large Adult   Pulse: 94   Resp: 16   Temp: 97.4 °F (36.3 °C)   TempSrc: Temporal   SpO2: 99%   Weight: 111 kg (244 lb 6.4 oz)   Height: 172.7 cm (67.99\")     Body mass index is 37.17 kg/m².         Physical Exam  Vitals reviewed.   Constitutional:       Appearance: Normal appearance. She is well-developed.   HENT:      Head: Normocephalic and atraumatic.      Mouth/Throat:      Pharynx: No oropharyngeal exudate.   Eyes:      Conjunctiva/sclera: Conjunctivae normal.      Pupils: Pupils are equal, round, and reactive to light.   Cardiovascular:      Rate and Rhythm: Normal rate and regular rhythm.      Heart sounds: Normal heart sounds. No murmur heard.     No friction rub. No gallop.   Pulmonary:      Effort: Pulmonary effort is normal.      Breath sounds: Normal breath sounds. No wheezing or rhonchi.   Skin:     General: Skin is warm and dry.   Neurological:      Mental Status: She is alert and oriented to person, place, and time.   Psychiatric:         Mood and Affect: Mood and affect normal.         Behavior: Behavior normal.         Thought Content: Thought content normal.         Judgment: Judgment " normal.             Result Review :               Assessment and Plan     Diagnoses and all orders for this visit:    1. Obesity, Class II, BMI 35-39.9  -     phentermine 37.5 MG capsule; Take 1 capsule by mouth Every Morning.  Dispense: 30 capsule; Refill: 1              Follow Up     Return in about 1 month (around 6/29/2024).    Patient was given instructions and counseling regarding her condition or for health maintenance advice. Please see specific information pulled into the AVS if appropriate.

## 2024-06-06 ENCOUNTER — TELEPHONE (OUTPATIENT)
Dept: FAMILY MEDICINE CLINIC | Facility: CLINIC | Age: 49
End: 2024-06-06
Payer: COMMERCIAL

## 2024-06-06 NOTE — TELEPHONE ENCOUNTER
Caller: Mere Ivory    Relationship to patient: Self    Best call back number: [phone]    Chief complaint: 1 MO F/U     Type of visit: OFFICE     Requested date: 6.24.24 AT 7AM      If rescheduling, when is the original appointment: 7.1.24    Additional notes: PATIENT CALLED REQUESTING TO RESCHEDULE HER APPOINTMENT  FOR 7.1.24 TO A WEEK EARLIER ON 6.24 AROUND 7AM. HUB UNABLE TO ACCOMMODATE.

## 2024-06-09 DIAGNOSIS — J96.01 ACUTE RESPIRATORY FAILURE WITH HYPOXEMIA: ICD-10-CM

## 2024-06-09 DIAGNOSIS — G47.33 OSA (OBSTRUCTIVE SLEEP APNEA): ICD-10-CM

## 2024-06-10 RX ORDER — CETIRIZINE HYDROCHLORIDE 10 MG/1
10 TABLET ORAL DAILY
Qty: 90 TABLET | Refills: 3 | Status: SHIPPED | OUTPATIENT
Start: 2024-06-10

## 2024-06-21 NOTE — PROGRESS NOTES
Primary Care Provider  Bala Thompson APRN   Referring Provider  No ref. provider found      Patient Complaint  Asthma, Sleep Apnea, and Follow-up (Pt here for 6 month follow up)      Subjective          Mere Ivory presents to [facility]      History of Presenting Illness  Mere Ivory is a 48 y.o. female patient of Dr. Lorenz with history of asthma, eosinophilia, BRANDT, and tobacco use in remission, here for 6 month follow-up.    Patient states she is doing well since her last visit, had an episode of post-nasal drip and sinus issues recently but resolved.  She denies using any antibiotics or steroids for her lungs recently, denies any fevers or chills, no ER visits or hospitalizations for her breathing since she was last seen.  Her last hospitalization for her breathing was February 2023.  Patient continues to use Advair daily as well as her albuterol inhaler and DuoNeb treatments as needed.  She is wondering if she needs to continue using Advair daily, but is hesitant as she does not want her symptoms to return.  She also takes Singulair and Zyrtec for allergies.  Patient wears her CPAP nightly.  She had bariatric surgery in July 2023.  She is a light former smoker, quit 12 years ago, 1 pack year.  Patient denies any productive cough, hemoptysis, swollen lymph nodes, weight loss, or night sweats.  Overall, patient is doing well and has no additional concerns at this time.  Patient is able to perform ADLs without difficulty.  I have personally reviewed the review of systems, past family, social, medical and surgical histories; and agree with their findings.      Review of Systems    Review of Systems   Constitutional:  Negative for activity change, chills, fatigue, fever, unexpected weight gain and unexpected weight loss.   HENT:  Negative for congestion, ear discharge, ear pain, mouth sores, postnasal drip, rhinorrhea, sinus pressure, sore  throat, swollen glands and trouble swallowing.    Eyes:  Negative for blurred vision, pain, discharge, itching and visual disturbance.   Respiratory:  Negative for apnea, cough, chest tightness, shortness of breath, wheezing and stridor.    Cardiovascular:  Negative for chest pain, palpitations and leg swelling.   Gastrointestinal:  Negative for abdominal distention, abdominal pain, constipation, diarrhea, nausea, vomiting, GERD and indigestion.   Musculoskeletal:  Negative for arthralgias, joint swelling and myalgias.   Skin:  Negative for color change.   Neurological:  Negative for dizziness, weakness, light-headedness and headache.      Sleep: Negative for Excessive daytime sleepiness  Negative for morning headaches  Negative for Snoring      Family History   Problem Relation Age of Onset    Hypertension Father     Diabetes Father     Diabetes Mother     Hypertension Mother     Obesity Mother     Arthritis Mother     Kidney disease Mother     Stroke Neg Hx     Malig Hyperthermia Neg Hx     Breast cancer Neg Hx     Ovarian cancer Neg Hx     Uterine cancer Neg Hx     Colon cancer Neg Hx     Melanoma Neg Hx     Prostate cancer Neg Hx         Social History     Socioeconomic History    Marital status:     Number of children: 0   Tobacco Use    Smoking status: Former     Current packs/day: 0.00     Average packs/day: 0.3 packs/day for 5.2 years (1.3 ttl pk-yrs)     Types: Cigarettes     Start date: 2007     Quit date: 2012     Years since quittin.4    Smokeless tobacco: Never    Tobacco comments:     QUIT 2012 WAS A SOCIAL SMOKER FOR 18 YEARS   Vaping Use    Vaping status: Never Used   Substance and Sexual Activity    Alcohol use: Not Currently     Comment: RARELY    Drug use: Never    Sexual activity: Yes     Partners: Male     Birth control/protection: None        Past Medical History:   Diagnosis Date    Achilles tendon rupture, right, subsequent encounter     Acute respiratory failure with  hypoxemia 02/28/2023    Arthritis     Arthritis 09/23/2021    Asthma     Asthma 06/13/2023    HTN (hypertension)     Irregular menstrual cycle 04/18/2023    BRANDT on CPAP 06/13/2023    PCOS (polycystic ovarian syndrome)     PONV (postoperative nausea and vomiting)         Immunization History   Administered Date(s) Administered    COVID-19 (PFIZER) Purple Cap Monovalent 03/10/2021, 03/31/2021    Flu Vaccine Quad PF >36MO 10/13/2020    Flublok 18+yrs 10/09/2023    Fluzone (or Fluarix & Flulaval for VFC) >6mos 10/13/2020, 09/23/2021, 12/02/2022    Influenza, Unspecified 10/01/2019    PPD Test 11/20/2019, 04/21/2021, 08/20/2022    Tdap 11/01/2019       No Known Allergies       Current Outpatient Medications:     albuterol sulfate  (90 Base) MCG/ACT inhaler, Inhale 2 puffs Every 4 (Four) Hours As Needed for Wheezing., Disp: 18 g, Rfl: 11    cetirizine (zyrTEC) 10 MG tablet, Take 1 tablet by mouth Daily., Disp: 90 tablet, Rfl: 3    Fluticasone-Salmeterol (ADVAIR/WIXELA) 250-50 MCG/ACT DISKUS, Inhale 1 puff 2 (Two) Times a Day. Rinse mouth with water and spit, Disp: 60 each, Rfl: 11    ipratropium-albuterol (DUO-NEB) 0.5-2.5 mg/3 ml nebulizer, Take 3 mL by nebulization Every 4 (Four) Hours as needed for shortness of air or wheezing, Disp: 360 mL, Rfl: 5    losartan (COZAAR) 100 MG tablet, Take 1 tablet by mouth Daily., Disp: 90 tablet, Rfl: 1    montelukast (SINGULAIR) 10 MG tablet, Take 1 tablet by mouth Every Night., Disp: 90 tablet, Rfl: 3    multivitamin with minerals tablet tablet, Take 1 tablet by mouth Daily., Disp: , Rfl:     phentermine 37.5 MG capsule, Take 1 capsule by mouth Every Morning., Disp: 30 capsule, Rfl: 1    polyethylene glycol (MIRALAX) 17 GM/SCOOP powder, Take as directed.  Instructions given in office.  Dispense: 238 g bottle, Disp: 238 g, Rfl: 0    Tirzepatide-Weight Management (ZEPBOUND) 5 MG/0.5ML solution auto-injector, Inject 0.5 mL under the skin into the appropriate area as directed 1  "(One) Time Per Week., Disp: 2 mL, Rfl: 2    omeprazole (priLOSEC) 20 MG capsule, Take 1 capsule by mouth Daily. (Patient not taking: Reported on 6/24/2024), Disp: , Rfl:      Objective     Vital Signs:   /59 (BP Location: Left arm, Patient Position: Sitting, Cuff Size: Large Adult)   Pulse 77   Temp 97.7 °F (36.5 °C) (Temporal)   Resp 16   Ht 172.7 cm (67.99\")   Wt 110 kg (242 lb 6.4 oz)   SpO2 98%   BMI 36.87 kg/m²     Physical Exam  Constitutional:       General: She is not in acute distress.     Appearance: Normal appearance. She is obese. She is not ill-appearing.   HENT:      Right Ear: Tympanic membrane and ear canal normal.      Left Ear: Tympanic membrane and ear canal normal.      Nose: Nose normal.      Mouth/Throat:      Mouth: Mucous membranes are moist.      Pharynx: Oropharynx is clear.   Eyes:      Extraocular Movements: Extraocular movements intact.      Conjunctiva/sclera: Conjunctivae normal.      Pupils: Pupils are equal, round, and reactive to light.   Cardiovascular:      Rate and Rhythm: Normal rate and regular rhythm.      Pulses: Normal pulses.      Heart sounds: Normal heart sounds.   Pulmonary:      Effort: Pulmonary effort is normal. No respiratory distress.      Breath sounds: Normal breath sounds. No stridor. No wheezing, rhonchi or rales.   Abdominal:      General: Bowel sounds are normal.      Palpations: Abdomen is soft.   Musculoskeletal:         General: No swelling. Normal range of motion.      Cervical back: Normal range of motion and neck supple.      Right lower leg: No edema.      Left lower leg: No edema.   Skin:     General: Skin is warm and dry.   Neurological:      General: No focal deficit present.      Mental Status: She is alert and oriented to person, place, and time.      Motor: No weakness.   Psychiatric:         Mood and Affect: Mood normal.         Behavior: Behavior normal.        Result Review :   I have personally reviewed patient's labs and images.  " I also reviewed Dr. Lorenz's last office note 11/22/2023.            Diagnoses and all orders for this visit:    1. History of asthma (Primary)    2. Acute respiratory failure with hypoxemia  -     Fluticasone-Salmeterol (ADVAIR/WIXELA) 250-50 MCG/ACT DISKUS; Inhale 1 puff 2 (Two) Times a Day. Rinse mouth with water and spit  Dispense: 60 each; Refill: 11  -     albuterol sulfate  (90 Base) MCG/ACT inhaler; Inhale 2 puffs Every 4 (Four) Hours As Needed for Wheezing.  Dispense: 18 g; Refill: 11    3. BRANDT (obstructive sleep apnea)  -     Fluticasone-Salmeterol (ADVAIR/WIXELA) 250-50 MCG/ACT DISKUS; Inhale 1 puff 2 (Two) Times a Day. Rinse mouth with water and spit  Dispense: 60 each; Refill: 11  -     albuterol sulfate  (90 Base) MCG/ACT inhaler; Inhale 2 puffs Every 4 (Four) Hours As Needed for Wheezing.  Dispense: 18 g; Refill: 11    4. Peripheral eosinophilia    5. Elevated IgE level    6. Dyspnea, unspecified type    7. Tobacco abuse, in remission       Impression and Plan    -CT chest 2/9/2023 negative for PE, no acute cardiopulmonary processes.  There was mild, nonspecific mediastinal lymphadenopathy.  -PFTs 4/6/2023 showed normal spirometry without significant response to bronchodilator, normal lung volumes, and normal DLCO after adjusting for [facility]  -Peripheral eosinophilia most recently 1480 on 4/24/2024  -IgE elevated 42.9 on 2/28/2023  -Continue wearing CPAP nightly, managed by sleep medicine  -Continue using Advair daily, reminded patient to rinse mouth after each use.  Can discuss trying to stop Advair next visit to see if she still needs it.  -Continue using albuterol inhaler and DuoNeb treatments as needed  -Continue taking Singulair and Zyrtec for allergies  -Follow-up with Dr. Lorenz or myself in 6 months, may return sooner if needed    Smoking status: Reviewed  Vaccination status: Patient reports she is up-to-date with her flu and Covid vaccines.  Patient is advised to continue to follow CDC  recommendations such as social distancing wearing a mask and washing hands for at least 20 seconds.  Medications personally reviewed    Follow Up   No follow-ups on file.  Patient was given instructions and counseling regarding her condition or for health maintenance advice. Please see specific information pulled into the AVS if appropriate.

## 2024-06-24 ENCOUNTER — OFFICE VISIT (OUTPATIENT)
Dept: PULMONOLOGY | Facility: CLINIC | Age: 49
End: 2024-06-24
Payer: COMMERCIAL

## 2024-06-24 VITALS
SYSTOLIC BLOOD PRESSURE: 121 MMHG | HEART RATE: 77 BPM | DIASTOLIC BLOOD PRESSURE: 59 MMHG | RESPIRATION RATE: 16 BRPM | BODY MASS INDEX: 36.74 KG/M2 | TEMPERATURE: 97.7 F | OXYGEN SATURATION: 98 % | WEIGHT: 242.4 LBS | HEIGHT: 68 IN

## 2024-06-24 DIAGNOSIS — F17.201 TOBACCO ABUSE, IN REMISSION: ICD-10-CM

## 2024-06-24 DIAGNOSIS — R76.8 ELEVATED IGE LEVEL: ICD-10-CM

## 2024-06-24 DIAGNOSIS — G47.33 OSA (OBSTRUCTIVE SLEEP APNEA): ICD-10-CM

## 2024-06-24 DIAGNOSIS — Z87.09 HISTORY OF ASTHMA: Primary | ICD-10-CM

## 2024-06-24 DIAGNOSIS — J96.01 ACUTE RESPIRATORY FAILURE WITH HYPOXEMIA: ICD-10-CM

## 2024-06-24 DIAGNOSIS — R06.00 DYSPNEA, UNSPECIFIED TYPE: ICD-10-CM

## 2024-06-24 DIAGNOSIS — D72.19 PERIPHERAL EOSINOPHILIA: ICD-10-CM

## 2024-06-24 PROCEDURE — 99214 OFFICE O/P EST MOD 30 MIN: CPT

## 2024-06-24 RX ORDER — FLUTICASONE PROPIONATE AND SALMETEROL 250; 50 UG/1; UG/1
1 POWDER RESPIRATORY (INHALATION)
Qty: 60 EACH | Refills: 11 | Status: SHIPPED | OUTPATIENT
Start: 2024-06-24

## 2024-06-24 RX ORDER — ALBUTEROL SULFATE 90 UG/1
2 AEROSOL, METERED RESPIRATORY (INHALATION) EVERY 4 HOURS PRN
Qty: 18 G | Refills: 11 | Status: SHIPPED | OUTPATIENT
Start: 2024-06-24

## 2024-07-01 ENCOUNTER — OFFICE VISIT (OUTPATIENT)
Dept: FAMILY MEDICINE CLINIC | Facility: CLINIC | Age: 49
End: 2024-07-01
Payer: COMMERCIAL

## 2024-07-01 VITALS
TEMPERATURE: 96 F | HEIGHT: 68 IN | WEIGHT: 238 LBS | DIASTOLIC BLOOD PRESSURE: 86 MMHG | RESPIRATION RATE: 18 BRPM | SYSTOLIC BLOOD PRESSURE: 120 MMHG | OXYGEN SATURATION: 99 % | BODY MASS INDEX: 36.07 KG/M2 | HEART RATE: 84 BPM

## 2024-07-01 DIAGNOSIS — E66.09 CLASS 2 OBESITY DUE TO EXCESS CALORIES WITH BODY MASS INDEX (BMI) OF 36.0 TO 36.9 IN ADULT, UNSPECIFIED WHETHER SERIOUS COMORBIDITY PRESENT: ICD-10-CM

## 2024-07-01 PROCEDURE — 99213 OFFICE O/P EST LOW 20 MIN: CPT | Performed by: NURSE PRACTITIONER

## 2024-07-01 RX ORDER — PHENTERMINE HYDROCHLORIDE 37.5 MG/1
37.5 CAPSULE ORAL EVERY MORNING
Qty: 30 CAPSULE | Refills: 1 | Status: SHIPPED | OUTPATIENT
Start: 2024-07-01

## 2024-07-01 NOTE — PROGRESS NOTES
Answers submitted by the patient for this visit:  Other (Submitted on 6/27/2024)  Please describe your symptoms.: 1 month follow up, on weight loss med  Have you had these symptoms before?: Yes  How long have you been having these symptoms?: Greater than 2 weeks  Please list any medications you are currently taking for this condition.: Phentermine  Primary Reason for Visit (Submitted on 6/27/2024)  What is the primary reason for your visit?: Other  Chief Complaint  Obesity    Subjective        Mere Ivory presents to [facility]  History of Present Illness  Obesity:  has lost 6 pounds in the last month.  States is trying to get protein and drinking water.  Denies chest pain or shortness of breath.  Trying to get in 150 minutes of week  Obesity  Pertinent negatives include no chest pain, coughing, fever, numbness or weakness.       The following portions of the patient's history were personally reviewed and updated as appropriate: allergies, current medications, past medical history, past surgical history, past family history, and past social history.     Body mass index is 36.2 kg/m².           Past History:    Medical History: has a past medical history of Achilles tendon rupture, right, subsequent encounter, Acute respiratory failure with hypoxemia (02/28/2023), Arthritis, Arthritis (09/23/2021), Asthma, Asthma (06/13/2023), HTN (hypertension), Irregular menstrual cycle (04/18/2023), BRANDT on CPAP (06/13/2023), PCOS (polycystic ovarian syndrome), and PONV (postoperative nausea and vomiting).     Surgical History: has a past surgical history that includes LASIK (2011); Achilles tendon surgery (Right, 03/18/2022); Gastric Sleeve (N/A, 07/17/2023); and Bariatric Surgery (07/2023).     Family History: family history includes Arthritis in her mother; Diabetes in her father and mother; Hypertension in her father and mother; Kidney disease in her mother; Obesity in her mother.      Social History: reports that she quit smoking about 12 years ago. Her smoking use included cigarettes. She started smoking about 17 years ago. She has a 1.3 pack-year smoking history. She has never used smokeless tobacco. She reports that she does not currently use alcohol. She reports that she does not use drugs.    Allergies: Patient has no known allergies.          Current Outpatient Medications:     albuterol sulfate  (90 Base) MCG/ACT inhaler, Inhale 2 puffs Every 4 (Four) Hours As Needed for Wheezing., Disp: 18 g, Rfl: 11    cetirizine (zyrTEC) 10 MG tablet, Take 1 tablet by mouth Daily., Disp: 90 tablet, Rfl: 3    Fluticasone-Salmeterol (ADVAIR/WIXELA) 250-50 MCG/ACT DISKUS, Inhale 1 puff 2 (Two) Times a Day. Rinse mouth with water and spit, Disp: 60 each, Rfl: 11    ipratropium-albuterol (DUO-NEB) 0.5-2.5 mg/3 ml nebulizer, Take 3 mL by nebulization Every 4 (Four) Hours as needed for shortness of air or wheezing, Disp: 360 mL, Rfl: 5    losartan (COZAAR) 100 MG tablet, Take 1 tablet by mouth Daily., Disp: 90 tablet, Rfl: 1    montelukast (SINGULAIR) 10 MG tablet, Take 1 tablet by mouth Every Night., Disp: 90 tablet, Rfl: 3    multivitamin with minerals tablet tablet, Take 1 tablet by mouth Daily., Disp: , Rfl:     polyethylene glycol (MIRALAX) 17 GM/SCOOP powder, Take as directed.  Instructions given in office.  Dispense: 238 g bottle, Disp: 238 g, Rfl: 0    Tirzepatide-Weight Management (ZEPBOUND) 5 MG/0.5ML solution auto-injector, Inject 0.5 mL under the skin into the appropriate area as directed 1 (One) Time Per Week., Disp: 2 mL, Rfl: 2    omeprazole (priLOSEC) 20 MG capsule, Take 1 capsule by mouth Daily. (Patient not taking: Reported on 6/24/2024), Disp: , Rfl:     phentermine 37.5 MG capsule, Take 1 capsule by mouth Every Morning., Disp: 30 capsule, Rfl: 1    Medications Discontinued During This Encounter   Medication Reason    phentermine 37.5 MG capsule Reorder         Review of Systems  "  Constitutional:  Negative for fever.   Respiratory:  Negative for cough.    Cardiovascular:  Negative for chest pain.   Neurological:  Negative for weakness and numbness.        Objective         Vitals:    07/01/24 0730   BP: 120/86   BP Location: Right arm   Patient Position: Sitting   Cuff Size: Adult   Pulse: 84   Resp: 18   Temp: 96 °F (35.6 °C)   TempSrc: Temporal   SpO2: 99%   Weight: 108 kg (238 lb)   Height: 172.7 cm (67.99\")     Body mass index is 36.2 kg/m².         Physical Exam  Vitals reviewed.   Constitutional:       Appearance: Normal appearance. She is well-developed.   HENT:      Head: Normocephalic and atraumatic.      Mouth/Throat:      Pharynx: No oropharyngeal exudate.   Eyes:      Conjunctiva/sclera: Conjunctivae normal.      Pupils: Pupils are equal, round, and reactive to light.   Cardiovascular:      Rate and Rhythm: Normal rate and regular rhythm.      Heart sounds: Normal heart sounds. No murmur heard.     No friction rub. No gallop.   Pulmonary:      Effort: Pulmonary effort is normal.      Breath sounds: Normal breath sounds. No wheezing or rhonchi.   Skin:     General: Skin is warm and dry.   Neurological:      Mental Status: She is alert and oriented to person, place, and time.   Psychiatric:         Mood and Affect: Mood and affect normal.         Behavior: Behavior normal.         Thought Content: Thought content normal.         Judgment: Judgment normal.             Result Review :               Assessment and Plan     Diagnoses and all orders for this visit:    1. Class 2 obesity due to excess calories with body mass index (BMI) of 36.0 to 36.9 in adult, unspecified whether serious comorbidity present  -     phentermine 37.5 MG capsule; Take 1 capsule by mouth Every Morning.  Dispense: 30 capsule; Refill: 1              Follow Up     Return in about 1 month (around 8/1/2024).    Patient was given instructions and counseling regarding her condition or for health maintenance " advice. Please see specific information pulled into the AVS if appropriate.

## 2024-07-16 DIAGNOSIS — I10 HYPERTENSION, UNSPECIFIED TYPE: ICD-10-CM

## 2024-07-17 RX ORDER — LOSARTAN POTASSIUM 100 MG/1
100 TABLET ORAL DAILY
Qty: 90 TABLET | Refills: 1 | Status: SHIPPED | OUTPATIENT
Start: 2024-07-17

## 2024-07-18 ENCOUNTER — TELEPHONE (OUTPATIENT)
Dept: SURGERY | Facility: CLINIC | Age: 49
End: 2024-07-18
Payer: COMMERCIAL

## 2024-07-18 NOTE — TELEPHONE ENCOUNTER
PATIENT CALLED AND SAID SHE DOESN'T HAVE HER BOWEL PREP INSTRUCTIONS.  SHE CONSULTED APRIL IN MARCH AND IS SCHEDULED WITH DR. GUY ON 07/25/24.    THE PHARMACY DIDN'T FILL HER MIRALAX IN MARCH BECAUSE IT WAS TOO EARLY.  SHE IS GOING TO CALL AND TRY TO GET FILLED.    CAN HER BOWEL PREP INSTRUCTIONS BE PUT ON MY CHART, AND CAN SHE BE CALLED WHEN THEY ARE PUT ON THERE?    #[phone]

## 2024-07-22 NOTE — TELEPHONE ENCOUNTER
I had spoke to the Pt on Friday about her bowel prep we went over it on the phone. I have uploaded the bowel prep to her [employer].

## 2024-07-22 NOTE — PRE-PROCEDURE INSTRUCTIONS
"PAT call attempted.  No answer.  Detailed message with date and arrival time of 1300 given.  Come to entrance \"C\"; must have adult  for transportation home; may have two visitors; however, children under 12 must remain in waiting area; instructed on diet/clear liquids/NPO/bowel prep, if needed; may take normal meds two hours prior to arrival time except for blood thinners, antidiabetics, diuretics, and weight loss meds.  Instructed to return call to confirm receipt of instructions and for any questions.   Hold Zepbound and phentermine for one week prior to procedure.  "

## 2024-07-24 ENCOUNTER — ANESTHESIA EVENT (OUTPATIENT)
Dept: GASTROENTEROLOGY | Facility: HOSPITAL | Age: 49
End: 2024-07-24
Payer: COMMERCIAL

## 2024-07-24 NOTE — ANESTHESIA PREPROCEDURE EVALUATION
Anesthesia Evaluation     Patient summary reviewed, Nursing notes reviewed and pregnancy test completed   history of anesthetic complications:  PONV  NPO Solid Status: > 8 hours  NPO Liquid Status: > 4 hours           Airway   Mallampati: II  TM distance: >3 FB  Neck ROM: full  No difficulty expected  Comment: OVER BITE  Dental - normal exam     Pulmonary - normal exam   (+) asthma (2 days ago),sleep apnea on CPAP  Cardiovascular - normal exam  Exercise tolerance: good (4-7 METS)    ECG reviewed    (+) hypertension, hyperlipidemia      Neuro/Psych- negative ROS  GI/Hepatic/Renal/Endo    (+) morbid obesity    ROS Comment: Hx gastric sleeve    Musculoskeletal     Abdominal    Substance History - negative use     OB/GYN negative ob/gyn ROS         Other   arthritis,     ROS/Med Hx Other:     - NORMAL ECG -  Sinus rhythm  When compared with ECG of 19-Feb-2023 15:11:14,  No significant change  Electronically Signed By: Nicky Benavides ([facility]) 28-Feb-2023 08:57:39  Date and Time of Study: 2023-02-28 07:47:41      ECHO 02/2023  Left ventricular systolic function is normal. Calculated left ventricular EF = 65%. No valve abnormalities seen.      ZEPBOUND-- LAST DOSE 07/17/2024              Anesthesia Plan    ASA 3     general   total IV anesthesia  (Total IV Anesthesia    Patient understands anesthesia not responsible for dental damage.  )  intravenous induction     Anesthetic plan, risks, benefits, and alternatives have been provided, discussed and informed consent has been obtained with: patient.    Plan discussed with CRNA.    CODE STATUS:

## 2024-07-25 ENCOUNTER — ANESTHESIA (OUTPATIENT)
Dept: GASTROENTEROLOGY | Facility: HOSPITAL | Age: 49
End: 2024-07-25
Payer: COMMERCIAL

## 2024-07-25 ENCOUNTER — HOSPITAL ENCOUNTER (OUTPATIENT)
Facility: HOSPITAL | Age: 49
Setting detail: HOSPITAL OUTPATIENT SURGERY
Discharge: HOME | End: 2024-07-25
Attending: SURGERY | Admitting: SURGERY
Payer: COMMERCIAL

## 2024-07-25 VITALS
BODY MASS INDEX: 34.47 KG/M2 | TEMPERATURE: 96.9 F | OXYGEN SATURATION: 100 % | DIASTOLIC BLOOD PRESSURE: 96 MMHG | SYSTOLIC BLOOD PRESSURE: 135 MMHG | HEART RATE: 66 BPM | RESPIRATION RATE: 24 BRPM | WEIGHT: 226.63 LBS

## 2024-07-25 DIAGNOSIS — R19.5 POSITIVE COLORECTAL CANCER SCREENING USING COLOGUARD TEST: ICD-10-CM

## 2024-07-25 LAB — B-HCG UR QL: NEGATIVE

## 2024-07-25 PROCEDURE — 25010000002 PROPOFOL 10 MG/ML EMULSION

## 2024-07-25 PROCEDURE — 81025 URINE PREGNANCY TEST: CPT | Performed by: NURSE ANESTHETIST, CERTIFIED REGISTERED

## 2024-07-25 PROCEDURE — 25810000003 LACTATED RINGERS PER 1000 ML: Performed by: NURSE ANESTHETIST, CERTIFIED REGISTERED

## 2024-07-25 RX ORDER — LIDOCAINE HYDROCHLORIDE 20 MG/ML
INJECTION, SOLUTION EPIDURAL; INFILTRATION; INTRACAUDAL; PERINEURAL AS NEEDED
Status: DISCONTINUED | OUTPATIENT
Start: 2024-07-25 | End: 2024-07-25 | Stop reason: SURG

## 2024-07-25 RX ORDER — PROPOFOL 10 MG/ML
VIAL (ML) INTRAVENOUS AS NEEDED
Status: DISCONTINUED | OUTPATIENT
Start: 2024-07-25 | End: 2024-07-25 | Stop reason: SURG

## 2024-07-25 RX ORDER — SODIUM CHLORIDE, SODIUM LACTATE, POTASSIUM CHLORIDE, CALCIUM CHLORIDE 600; 310; 30; 20 MG/100ML; MG/100ML; MG/100ML; MG/100ML
30 INJECTION, SOLUTION INTRAVENOUS CONTINUOUS
Status: DISCONTINUED | OUTPATIENT
Start: 2024-07-25 | End: 2024-07-25 | Stop reason: HOSPADM

## 2024-07-25 RX ORDER — SODIUM CHLORIDE 9 MG/ML
40 INJECTION, SOLUTION INTRAVENOUS AS NEEDED
Status: DISCONTINUED | OUTPATIENT
Start: 2024-07-25 | End: 2024-07-25 | Stop reason: HOSPADM

## 2024-07-25 RX ORDER — SODIUM CHLORIDE 0.9 % (FLUSH) 0.9 %
10 SYRINGE (ML) INJECTION AS NEEDED
Status: DISCONTINUED | OUTPATIENT
Start: 2024-07-25 | End: 2024-07-25 | Stop reason: HOSPADM

## 2024-07-25 RX ORDER — SODIUM CHLORIDE 0.9 % (FLUSH) 0.9 %
3 SYRINGE (ML) INJECTION EVERY 12 HOURS SCHEDULED
Status: DISCONTINUED | OUTPATIENT
Start: 2024-07-25 | End: 2024-07-25 | Stop reason: HOSPADM

## 2024-07-25 RX ADMIN — PROPOFOL 200 MCG/KG/MIN: 10 INJECTION, EMULSION INTRAVENOUS at 15:09

## 2024-07-25 RX ADMIN — SODIUM CHLORIDE, POTASSIUM CHLORIDE, SODIUM LACTATE AND CALCIUM CHLORIDE 30 ML/HR: 600; 310; 30; 20 INJECTION, SOLUTION INTRAVENOUS at 14:30

## 2024-07-25 RX ADMIN — PROPOFOL 100 MG: 10 INJECTION, EMULSION INTRAVENOUS at 15:08

## 2024-07-25 RX ADMIN — LIDOCAINE HYDROCHLORIDE 50 MG: 20 INJECTION, SOLUTION INTRAVENOUS at 15:08

## 2024-07-25 NOTE — H&P
Colonoscopy consultation        History of Present Illness  Mere Ivory is a 48 y.o. female presents to [facility] for colonoscopy consultation.     Patient presents today on referral from Shubham Elder for colonoscopy consultation.  Patient was with a positive Cologuard in February 2022.  Patient feels like this result is inaccurate.  Denies any abdominal pain or change in bowel habit.  Denies any rectal bleeding.  Denies any family history of colorectal cancer.  No previous colonoscopy.     Denies BRANDT.  Denies any cardiac issues.  Denies taking a GLP-1 receptors        Objective  Medical History        Past Medical History:   Diagnosis Date    Achilles tendon rupture, right, subsequent encounter      Acute respiratory failure with hypoxemia 02/28/2023    Arthritis      Arthritis 09/23/2021    Asthma      Asthma 06/13/2023    HTN (hypertension)      Irregular menstrual cycle 04/18/2023    BRANDT on CPAP 06/13/2023    PCOS (polycystic ovarian syndrome)      PONV (postoperative nausea and vomiting)              Surgical History         Past Surgical History:   Procedure Laterality Date    ACHILLES TENDON SURGERY Right 03/18/2022     Procedure: ACHILLES TENDON REPAIR, BONE SPUR EXCISION;  Surgeon: Sergei Russ DPM;  Location: [facility] OR;  Service: Podiatry;  Laterality: Right;    GASTRIC SLEEVE LAPAROSCOPIC N/A 07/17/2023     Procedure: GASTRIC SLEEVE LAPAROSCOPIC WITH ROBOTIC;  Surgeon: Darren Wolfe Jr., MD;  Location: [facility];  Service: Robotics - [facility];  Laterality: N/A;    LASIK 2011            Medications Taking          Outpatient Medications Marked as Taking for the 3/5/24 encounter (Office Visit) with Nancy Devries APRN   Medication Sig Dispense Refill    albuterol sulfate  (90 Base) MCG/ACT inhaler Inhale 2 puffs Every 4 (Four) Hours As Needed for Wheezing. 18 g 11    cetirizine (zyrTEC) 10 MG tablet Take 1 tablet by  mouth Daily. 90 tablet 3    Fluticasone-Salmeterol (ADVAIR/WIXELA) 250-50 MCG/ACT DISKUS Inhale 1 puff 2 (Two) Times a Day. Rinse mouth with water and spit 60 each 11    ipratropium-albuterol (DUO-NEB) 0.5-2.5 mg/3 ml nebulizer Take 3 mL by nebulization Every 4 (Four) Hours as needed for shortness of air or wheezing 360 mL 5    losartan (COZAAR) 100 MG tablet Take 1 tablet by mouth Daily. (Patient taking differently: Take 1 tablet by mouth Every Evening. HOLD EVENING DOSE DAY BEFORE SURGERY) 90 tablet 1    montelukast (SINGULAIR) 10 MG tablet Take 1 tablet by mouth Every Night. 90 tablet 3    multivitamin with minerals tablet tablet Take 1 tablet by mouth Daily.        omeprazole (priLOSEC) 20 MG capsule Take 1 capsule by mouth Daily.                Allergies   No Known Allergies               Family History   Problem Relation Age of Onset    Hypertension Father      Diabetes Father      Diabetes Mother      Hypertension Mother      Obesity Mother      Arthritis Mother      Kidney disease Mother      Stroke Neg Hx      Malig Hyperthermia Neg Hx      Breast cancer Neg Hx      Ovarian cancer Neg Hx      Uterine cancer Neg Hx      Colon cancer Neg Hx      Melanoma Neg Hx      Prostate cancer Neg Hx           Social History   Social History            Socioeconomic History    Marital status:     Number of children: 0   Tobacco Use    Smoking status: Former       Current packs/day: 0.00       Average packs/day: 0.3 packs/day for 5.2 years (1.3 ttl pk-yrs)       Types: Cigarettes       Start date: 2007       Quit date: 2012       Years since quittin.1    Smokeless tobacco: Never    Tobacco comments:       QUIT 2012 WAS A SOCIAL SMOKER FOR 18 YEARS   Vaping Use    Vaping status: Never Used   Substance and Sexual Activity    Alcohol use: Not Currently       Comment: RARELY    Drug use: Never    Sexual activity: Yes       Partners: Male       Birth control/protection: None            Review of Systems  "  Constitutional:  Negative for chills and fever.   Gastrointestinal:  Negative for abdominal distention, abdominal pain, anal bleeding, blood in stool, constipation, diarrhea and rectal pain.         Vital Signs:   /83 (BP Location: Right arm, Patient Position: Sitting, Cuff Size: Large Adult)   Pulse 73   Ht 172.7 cm (68\")   Wt 116 kg (256 lb 12.8 oz)   BMI 39.05 kg/m²      Physical Exam  Vitals and nursing note reviewed.   Constitutional:       General: She is not in acute distress.     Appearance: She is obese.   HENT:      Head: Normocephalic.   Cardiovascular:      Rate and Rhythm: Normal rate.   Pulmonary:      Effort: Pulmonary effort is normal.      Breath sounds: No stridor.   Abdominal:      Palpations: Abdomen is soft.      Tenderness: There is no guarding.   Musculoskeletal:         General: No deformity. Normal range of motion.   Skin:     General: Skin is warm and dry.      Coloration: Skin is not jaundiced.   Neurological:      General: No focal deficit present.      Mental Status: She is alert and oriented to person, place, and time.   Psychiatric:         Mood and Affect: Mood normal.         Thought Content: Thought content normal.                  Result Review  :            []  Laboratory  []  Radiology  []  Pathology  []  Microbiology  []  EKG/Telemetry   []  Cardiology/Vascular   []  Old records  I spent 15 minutes caring for deborah on this date of service. This time includes time spent by me in the following activities: reviewing tests, obtaining and/or reviewing a separately obtained history, performing a medically appropriate examination and/or evaluation, ordering medications, tests, or procedures, and documenting information in the medical record.        Assessment  Assessment and Plan    Diagnoses and all orders for this visit:     1. Positive colorectal cancer screening using Cologuard test (Primary)     Other orders  -     polyethylene glycol (MIRALAX) 17 g packet; Take as " directed.  Instructions given in office.  Dispense: 238 g bottle  Dispense: 238 packet; Refill: 0           Follow Up   Return for Schedule colonoscopy with Dr. Ivory on 7/25/2024 [facility].     Hospital arrival time: 1300     Possible risks/complications, benefits, and alternatives to surgical or invasive procedures have been explained to patient and/or legal guardian.     Patient has been evaluated and can tolerate anesthesia and/or sedation. Risks, benefits, and alternatives to anesthesia and sedation have been explained to the patient and/or legal guardian. Patient verbalizes understanding and is willing to proceed with the above plan.      Patient was given instructions and counseling regarding her condition or for health maintenance advice. Please see specific information pulled into the AVS if appropriate.

## 2024-07-25 NOTE — ANESTHESIA POSTPROCEDURE EVALUATION
Patient: Mere Ivory    Procedure Summary       Date: 07/25/24 Room / Location: [facility] ENDOSCOPY 3 / [facility]COPY    Anesthesia Start: 1507 Anesthesia Stop: 1601    Procedure: COLONOSCOPY Diagnosis:       Positive colorectal cancer screening using Cologuard test      (Positive colorectal cancer screening using Cologuard test [R19.5])    Surgeons: Darren Ivory MD Provider: Navin Gagnon CRNA    Anesthesia Type: general ASA Status: 3            Anesthesia Type: general    Vitals  Vitals Value Taken Time   /102 07/25/24 1630   Temp 36.1 °C (96.9 °F) 07/25/24 1605   Pulse 65 07/25/24 1630   Resp 24 07/25/24 1625   SpO2 100 % 07/25/24 1630   Vitals shown include unfiled device data.        Post Anesthesia Care and Evaluation    Post-procedure mental status: acceptable.  Pain management: satisfactory to patient    Airway patency: patent  Anesthetic complications: No anesthetic complications    Cardiovascular status: acceptable  Respiratory status: acceptable    Comments: Per chart review

## 2024-08-01 ENCOUNTER — OFFICE VISIT (OUTPATIENT)
Dept: BARIATRICS/WEIGHT MGMT | Facility: CLINIC | Age: 49
End: 2024-08-01
Payer: COMMERCIAL

## 2024-08-01 VITALS
SYSTOLIC BLOOD PRESSURE: 140 MMHG | TEMPERATURE: 97.8 F | DIASTOLIC BLOOD PRESSURE: 86 MMHG | WEIGHT: 234 LBS | BODY MASS INDEX: 35.46 KG/M2 | HEART RATE: 69 BPM | HEIGHT: 68 IN

## 2024-08-01 DIAGNOSIS — E66.9 OBESITY, CLASS II, BMI 35-39.9: Primary | ICD-10-CM

## 2024-08-01 DIAGNOSIS — R53.82 CHRONIC FATIGUE: ICD-10-CM

## 2024-08-01 DIAGNOSIS — M25.50 MULTIPLE JOINT PAIN: ICD-10-CM

## 2024-08-01 DIAGNOSIS — E78.5 BORDERLINE HYPERLIPIDEMIA: ICD-10-CM

## 2024-08-01 DIAGNOSIS — E28.2 PCOS (POLYCYSTIC OVARIAN SYNDROME): ICD-10-CM

## 2024-08-01 DIAGNOSIS — Z98.84 S/P LAPAROSCOPIC SLEEVE GASTRECTOMY: ICD-10-CM

## 2024-08-01 DIAGNOSIS — G47.33 OSA ON CPAP: ICD-10-CM

## 2024-08-01 DIAGNOSIS — Z71.3 DIETARY COUNSELING: ICD-10-CM

## 2024-08-01 DIAGNOSIS — I10 PRIMARY HYPERTENSION: ICD-10-CM

## 2024-08-01 NOTE — PROGRESS NOTES
[facility]  [address]  [facility] [id number] [phone]  [address]  [facility] [id number] [phone]  Dept: [phone]  8/1/2024      Mere Ivory.  [id number]  [id number]  1975  female      Chief Complaint   Patient presents with    Follow-up     1 year follow up sleeve       BH Post-Op Bariatric Surgery:   Mere Ivory is status post Laparoscopic Sleeve procedure, performed on 7/17/2023     HPI:   Today's weight is 106 kg (234 lb) pounds, today's BMI is Body mass index is 35.59 kg/m²., has a  loss of 19 pounds since the last visit and weight loss since surgery is 123 pounds. The patient reports a decreased portion size and loss of appetite.      Mere Ivory denies nausea, vomiting, reflux, dysphagia and reports tolerating regular diet.  Is on Zepbound which she is paying out of pocket for.  Feels like is helping a lot with her head hunger as well as serving size and hunger between meals.       Diet and Exercise: Diet history reviewed and discussed with the patient. Weight loss/gains to date discussed with the patient. The patient states they are eating 100 grams of protein per day. She reports eating 2 meals per day, a typical portion size of 1/2 cup, eating 3 snacks per day, drinking 5+ or more 8-oz. glasses of water per day, no carbonated beverage consumption and exercising regularly.     Supplements: bmtv.     Review of Systems   Constitutional:  Positive for activity change and appetite change.   Respiratory:  Negative for shortness of breath.    Cardiovascular:  Negative for chest pain.   All other systems reviewed and are negative.      Patient Active Problem List   Diagnosis    Arthritis    Hypertension    Dietary counseling    Multiple joint pain    Snoring    Heartburn    PCOS (polycystic ovarian syndrome)    Borderline hyperlipidemia    Asthma    BRANDT on CPAP    S/P  laparoscopic sleeve gastrectomy    Chronic fatigue    Obesity, Class II, BMI 35-39.9    Positive colorectal cancer screening using Cologuard test       Past Medical History:   Diagnosis Date    Achilles tendon rupture, right, subsequent encounter     Acute respiratory failure with hypoxemia 02/28/2023    Arthritis     Arthritis 09/23/2021    Asthma     Asthma 06/13/2023    HTN (hypertension)     Irregular menstrual cycle 04/18/2023    BRANDT on CPAP 06/13/2023    PCOS (polycystic ovarian syndrome)     PONV (postoperative nausea and vomiting)        The following portions of the patient's history were reviewed and updated as appropriate: allergies, current medications, past medical history, past surgical history, and problem list.    Vitals:    08/01/24 1202   BP: 140/86   Pulse: 69   Temp: 97.8 °F (36.6 °C)       Physical Exam  Vitals reviewed.   Constitutional:       General: She is not in acute distress.     Appearance: Normal appearance. She is obese.   HENT:      Head: Normocephalic and atraumatic.      Mouth/Throat:      Mouth: Mucous membranes are moist.      Pharynx: Oropharynx is clear.   Eyes:      General: No scleral icterus.     Extraocular Movements: Extraocular movements intact.      Conjunctiva/sclera: Conjunctivae normal.      Pupils: Pupils are equal, round, and reactive to light.   Cardiovascular:      Rate and Rhythm: Normal rate and regular rhythm.   Pulmonary:      Effort: Pulmonary effort is normal. No respiratory distress.   Abdominal:      General: Bowel sounds are normal.      Palpations: Abdomen is soft.   Musculoskeletal:         General: Normal range of motion.      Cervical back: Normal range of motion and neck supple.   Skin:     General: Skin is warm and dry.   Neurological:      General: No focal deficit present.      Mental Status: She is alert and oriented to person, place, and time.   Psychiatric:         Mood and Affect: Mood normal.         Behavior: Behavior normal.         Thought  Content: Thought content normal.         Judgment: Judgment normal.         Assessment:   Post-op, the patient is doing well on Zepbound but paying out of pocket.  Wants to try compounding pharmacy.     Encounter Diagnoses   Name Primary?    Obesity, Class II, BMI 35-39.9 Yes    S/P laparoscopic sleeve gastrectomy     Dietary counseling     Primary hypertension     Borderline hyperlipidemia     PCOS (polycystic ovarian syndrome)     Multiple joint pain     BRANDT on CPAP     Chronic fatigue        Plan:   Will change to compounding pharmacy  Tizepitide 7.5 mg.  Discussed staying at the lowest effective dose.    Will get labs  Encouraged patient to be sure to get plenty of lean protein per day through small frequent meals all with a protein source.   Activity restrictions: none.   Recommended patient be sure to get at least 70 grams of protein per day by eating small, frequent meals all with high lean protein choices. Be sure to limit/cut back on daily carbohydrate intake. Discussed with the patient the recommended amount of water per day to intake- half of body weight in ounces. Reviewed vitamin requirements. Be sure to do routine exercise, 150 minutes per week minimum, including both cardio and strength training.     Instructions / Recommendations: dietary counseling recommended, recommended a daily protein intake of  grams, vitamin supplement(s) recommended, recommended exercising at least 150 minutes per week, behavior modifications recommended and instructed to call the office for concerns, questions, or problems.     The patient was instructed to follow up in 6 months.     Total time spent during this encounter today was 25 minutes

## 2024-08-08 ENCOUNTER — TELEPHONE (OUTPATIENT)
Dept: SURGERY | Facility: CLINIC | Age: 49
End: 2024-08-08
Payer: COMMERCIAL

## 2024-08-08 NOTE — TELEPHONE ENCOUNTER
----- Message from April Jaycob sent at 8/5/2024  7:21 PM EDT -----  10 year colon recall. [email or web address]

## 2024-08-09 ENCOUNTER — TELEPHONE (OUTPATIENT)
Dept: FAMILY MEDICINE CLINIC | Facility: CLINIC | Age: 49
End: 2024-08-09
Payer: COMMERCIAL

## 2024-08-09 NOTE — TELEPHONE ENCOUNTER
[facility]   Confirming appointment with Bala Thompson on 8/12/24  Hub to relay

## 2024-08-12 ENCOUNTER — OFFICE VISIT (OUTPATIENT)
Dept: FAMILY MEDICINE CLINIC | Facility: CLINIC | Age: 49
End: 2024-08-12
Payer: COMMERCIAL

## 2024-08-12 VITALS
OXYGEN SATURATION: 98 % | DIASTOLIC BLOOD PRESSURE: 72 MMHG | WEIGHT: 231.2 LBS | HEART RATE: 84 BPM | BODY MASS INDEX: 35.04 KG/M2 | HEIGHT: 68 IN | TEMPERATURE: 96.9 F | RESPIRATION RATE: 18 BRPM | SYSTOLIC BLOOD PRESSURE: 120 MMHG

## 2024-08-12 DIAGNOSIS — R11.0 NAUSEA: ICD-10-CM

## 2024-08-12 DIAGNOSIS — E66.09 CLASS 2 OBESITY DUE TO EXCESS CALORIES WITHOUT SERIOUS COMORBIDITY WITH BODY MASS INDEX (BMI) OF 35.0 TO 35.9 IN ADULT: Primary | ICD-10-CM

## 2024-08-12 PROCEDURE — 99213 OFFICE O/P EST LOW 20 MIN: CPT | Performed by: NURSE PRACTITIONER

## 2024-08-12 RX ORDER — ONDANSETRON 4 MG/1
4 TABLET, ORALLY DISINTEGRATING ORAL EVERY 8 HOURS PRN
Qty: 20 TABLET | Refills: 0 | Status: SHIPPED | OUTPATIENT
Start: 2024-08-12

## 2024-08-12 RX ORDER — PHENTERMINE HYDROCHLORIDE 37.5 MG/1
37.5 CAPSULE ORAL EVERY MORNING
Qty: 30 CAPSULE | Refills: 0 | Status: SHIPPED | OUTPATIENT
Start: 2024-08-12

## 2024-08-12 NOTE — PROGRESS NOTES
Answers submitted by the patient for this visit:  Other (Submitted on 8/12/2024)  Please describe your symptoms.: One month follow up on phentermine. If possible would like PRN zofran odt prescription.  Have you had these symptoms before?: Yes  How long have you been having these symptoms?: Greater than 2 weeks  Please list any medications you are currently taking for this condition.: Phentermine  Please describe any probable cause for these symptoms. : N/a  Primary Reason for Visit (Submitted on 8/12/2024)  What is the primary reason for your visit?: Other  Chief Complaint  Obesity    Subjective        Mere Ivory presents to [facility]  History of Present Illness  Obesity :  13 pounds in the last 2.5 months.  States is ready to come off phentermine and   Obesity  Pertinent negatives include no chest pain, coughing, fever, numbness or weakness.       The following portions of the patient's history were personally reviewed and updated as appropriate: allergies, current medications, past medical history, past surgical history, past family history, and past social history.     Body mass index is 35.16 kg/m².           Past History:    Medical History: has a past medical history of Achilles tendon rupture, right, subsequent encounter, Acute respiratory failure with hypoxemia (02/28/2023), Arthritis, Arthritis (09/23/2021), Asthma, Asthma (06/13/2023), HTN (hypertension), Irregular menstrual cycle (04/18/2023), BRANDT on CPAP (06/13/2023), PCOS (polycystic ovarian syndrome), and PONV (postoperative nausea and vomiting).     Surgical History: has a past surgical history that includes LASIK (2011); Achilles tendon surgery (Right, 03/18/2022); Gastric Sleeve (N/A, 07/17/2023); Bariatric Surgery (07/2023); and Colonoscopy (N/A, 7/25/2024).     Family History: family history includes Arthritis in her mother; Diabetes in her father and mother; Hypertension in her father and mother;  Kidney disease in her mother; Obesity in her mother.     Social History: reports that she quit smoking about 12 years ago. Her smoking use included cigarettes. She started smoking about 17 years ago. She has a 1.3 pack-year smoking history. She has been exposed to tobacco smoke. She has never used smokeless tobacco. She reports that she does not currently use alcohol. She reports that she does not use drugs.    Allergies: Patient has no known allergies.          Current Outpatient Medications:     albuterol sulfate  (90 Base) MCG/ACT inhaler, Inhale 2 puffs Every 4 (Four) Hours As Needed for Wheezing., Disp: 18 g, Rfl: 11    cetirizine (zyrTEC) 10 MG tablet, Take 1 tablet by mouth Daily., Disp: 90 tablet, Rfl: 3    Fluticasone-Salmeterol (ADVAIR/WIXELA) 250-50 MCG/ACT DISKUS, Inhale 1 puff 2 (Two) Times a Day. Rinse mouth with water and spit, Disp: 60 each, Rfl: 11    ipratropium-albuterol (DUO-NEB) 0.5-2.5 mg/3 ml nebulizer, Take 3 mL by nebulization Every 4 (Four) Hours as needed for shortness of air or wheezing, Disp: 360 mL, Rfl: 5    losartan (COZAAR) 100 MG tablet, Take 1 tablet by mouth Daily., Disp: 90 tablet, Rfl: 1    montelukast (SINGULAIR) 10 MG tablet, Take 1 tablet by mouth Every Night., Disp: 90 tablet, Rfl: 3    multivitamin with minerals tablet tablet, Take 1 tablet by mouth Daily., Disp: , Rfl:     phentermine 37.5 MG capsule, Take 1 capsule by mouth Every Morning., Disp: 30 capsule, Rfl: 0    Tirzepatide (MOUNJARO) 7.5 MG/0.5ML solution pen-injector pen, Inject 0.5 mL under the skin into the appropriate area as directed 1 (One) Time Per Week., Disp: 2 mL, Rfl: 5    Tirzepatide-Weight Management (ZEPBOUND) 5 MG/0.5ML solution auto-injector, Inject 0.5 mL under the skin into the appropriate area as directed 1 (One) Time Per Week., Disp: 2 mL, Rfl: 2    ondansetron ODT (ZOFRAN-ODT) 4 MG disintegrating tablet, Place 1 tablet on the tongue Every 8 (Eight) Hours As Needed for Nausea or Vomiting.,  "Disp: 20 tablet, Rfl: 0    Medications Discontinued During This Encounter   Medication Reason    phentermine 37.5 MG capsule Reorder         Review of Systems   Constitutional:  Negative for fever.   Respiratory:  Negative for cough and shortness of breath.    Cardiovascular:  Negative for chest pain, palpitations and leg swelling.   Neurological:  Negative for dizziness, weakness, numbness and headache.        Objective         Vitals:    08/12/24 0805   BP: 120/72   BP Location: Right arm   Patient Position: Sitting   Cuff Size: Large Adult   Pulse: 84   Resp: 18   Temp: 96.9 °F (36.1 °C)   TempSrc: Temporal   SpO2: 98%   Weight: 105 kg (231 lb 3.2 oz)   Height: 172.7 cm (67.99\")     Body mass index is 35.16 kg/m².         Physical Exam  Vitals reviewed.   Constitutional:       Appearance: Normal appearance. She is well-developed.   HENT:      Head: Normocephalic and atraumatic.      Mouth/Throat:      Pharynx: No oropharyngeal exudate.   Eyes:      Conjunctiva/sclera: Conjunctivae normal.      Pupils: Pupils are equal, round, and reactive to light.   Cardiovascular:      Rate and Rhythm: Normal rate and regular rhythm.      Heart sounds: Normal heart sounds. No murmur heard.     No friction rub. No gallop.   Pulmonary:      Effort: Pulmonary effort is normal.      Breath sounds: Normal breath sounds. No wheezing or rhonchi.   Skin:     General: Skin is warm and dry.   Neurological:      Mental Status: She is alert and oriented to person, place, and time.   Psychiatric:         Mood and Affect: Mood and affect normal.         Behavior: Behavior normal.         Thought Content: Thought content normal.         Judgment: Judgment normal.             Result Review :               Assessment and Plan     Diagnoses and all orders for this visit:    1. Class 2 obesity due to excess calories without serious comorbidity with body mass index (BMI) of 35.0 to 35.9 in adult (Primary)  -     phentermine 37.5 MG capsule; Take 1 " capsule by mouth Every Morning.  Dispense: 30 capsule; Refill: 0    2. Nausea  -     ondansetron ODT (ZOFRAN-ODT) 4 MG disintegrating tablet; Place 1 tablet on the tongue Every 8 (Eight) Hours As Needed for Nausea or Vomiting.  Dispense: 20 tablet; Refill: 0              Follow Up     Return in about 1 month (around 9/12/2024).    Patient was given instructions and counseling regarding her condition or for health maintenance advice. Please see specific information pulled into the AVS if appropriate.

## 2024-08-29 ENCOUNTER — LAB (OUTPATIENT)
Dept: LAB | Facility: HOSPITAL | Age: 49
End: 2024-08-29
Payer: COMMERCIAL

## 2024-08-29 DIAGNOSIS — G47.33 OSA ON CPAP: ICD-10-CM

## 2024-08-29 DIAGNOSIS — E28.2 PCOS (POLYCYSTIC OVARIAN SYNDROME): ICD-10-CM

## 2024-08-29 DIAGNOSIS — Z71.3 DIETARY COUNSELING: ICD-10-CM

## 2024-08-29 DIAGNOSIS — E66.9 OBESITY, CLASS II, BMI 35-39.9: ICD-10-CM

## 2024-08-29 DIAGNOSIS — M25.50 MULTIPLE JOINT PAIN: ICD-10-CM

## 2024-08-29 DIAGNOSIS — E78.5 BORDERLINE HYPERLIPIDEMIA: ICD-10-CM

## 2024-08-29 DIAGNOSIS — I10 PRIMARY HYPERTENSION: ICD-10-CM

## 2024-08-29 DIAGNOSIS — R53.82 CHRONIC FATIGUE: ICD-10-CM

## 2024-08-29 DIAGNOSIS — Z98.84 S/P LAPAROSCOPIC SLEEVE GASTRECTOMY: ICD-10-CM

## 2024-08-30 ENCOUNTER — LAB (OUTPATIENT)
Dept: LAB | Facility: HOSPITAL | Age: 49
End: 2024-08-30
Payer: COMMERCIAL

## 2024-08-30 DIAGNOSIS — E78.5 BORDERLINE HYPERLIPIDEMIA: ICD-10-CM

## 2024-08-30 DIAGNOSIS — R53.82 CHRONIC FATIGUE: ICD-10-CM

## 2024-08-30 DIAGNOSIS — G47.33 OSA ON CPAP: ICD-10-CM

## 2024-08-30 DIAGNOSIS — M25.50 MULTIPLE JOINT PAIN: ICD-10-CM

## 2024-08-30 DIAGNOSIS — Z71.3 DIETARY COUNSELING: ICD-10-CM

## 2024-08-30 DIAGNOSIS — E28.2 PCOS (POLYCYSTIC OVARIAN SYNDROME): ICD-10-CM

## 2024-08-30 DIAGNOSIS — Z98.84 S/P LAPAROSCOPIC SLEEVE GASTRECTOMY: ICD-10-CM

## 2024-08-30 DIAGNOSIS — E66.9 OBESITY, CLASS II, BMI 35-39.9: ICD-10-CM

## 2024-08-30 DIAGNOSIS — I10 PRIMARY HYPERTENSION: ICD-10-CM

## 2024-08-30 LAB
25(OH)D3 SERPL-MCNC: 40.9 NG/ML (ref 30–100)
ALBUMIN SERPL-MCNC: 4.2 G/DL (ref 3.5–5.2)
ALBUMIN/GLOB SERPL: 1.2 G/DL
ALP SERPL-CCNC: 77 U/L (ref 39–117)
ALT SERPL W P-5'-P-CCNC: 22 U/L (ref 1–33)
ANION GAP SERPL CALCULATED.3IONS-SCNC: 10.8 MMOL/L (ref 5–15)
AST SERPL-CCNC: 24 U/L (ref 1–32)
BASOPHILS # BLD AUTO: 0.12 10*3/MM3 (ref 0–0.2)
BASOPHILS NFR BLD AUTO: 2.1 % (ref 0–1.5)
BILIRUB SERPL-MCNC: 0.6 MG/DL (ref 0–1.2)
BUN SERPL-MCNC: 14 MG/DL (ref 6–20)
BUN/CREAT SERPL: 21.5 (ref 7–25)
CALCIUM SPEC-SCNC: 9.8 MG/DL (ref 8.6–10.5)
CHLORIDE SERPL-SCNC: 104 MMOL/L (ref 98–107)
CO2 SERPL-SCNC: 27.2 MMOL/L (ref 22–29)
CREAT SERPL-MCNC: 0.65 MG/DL (ref 0.57–1)
DEPRECATED RDW RBC AUTO: 40.9 FL (ref 37–54)
EGFRCR SERPLBLD CKD-EPI 2021: 106.7 ML/MIN/1.73
EOSINOPHIL # BLD AUTO: 1.05 10*3/MM3 (ref 0–0.4)
EOSINOPHIL NFR BLD AUTO: 18.6 % (ref 0.3–6.2)
ERYTHROCYTE [DISTWIDTH] IN BLOOD BY AUTOMATED COUNT: 13 % (ref 12.3–15.4)
FERRITIN SERPL-MCNC: 126 NG/ML (ref 13–150)
FOLATE SERPL-MCNC: 9.23 NG/ML (ref 4.78–24.2)
GLOBULIN UR ELPH-MCNC: 3.5 GM/DL
GLUCOSE SERPL-MCNC: 80 MG/DL (ref 65–99)
HCT VFR BLD AUTO: 37.4 % (ref 34–46.6)
HGB BLD-MCNC: 12.4 G/DL (ref 12–15.9)
IMM GRANULOCYTES # BLD AUTO: 0.02 10*3/MM3 (ref 0–0.05)
IMM GRANULOCYTES NFR BLD AUTO: 0.4 % (ref 0–0.5)
IRON 24H UR-MRATE: 83 MCG/DL (ref 37–145)
LYMPHOCYTES # BLD AUTO: 1.73 10*3/MM3 (ref 0.7–3.1)
LYMPHOCYTES NFR BLD AUTO: 30.6 % (ref 19.6–45.3)
MCH RBC QN AUTO: 29 PG (ref 26.6–33)
MCHC RBC AUTO-ENTMCNC: 33.2 G/DL (ref 31.5–35.7)
MCV RBC AUTO: 87.4 FL (ref 79–97)
MONOCYTES # BLD AUTO: 0.4 10*3/MM3 (ref 0.1–0.9)
MONOCYTES NFR BLD AUTO: 7.1 % (ref 5–12)
NEUTROPHILS NFR BLD AUTO: 2.33 10*3/MM3 (ref 1.7–7)
NEUTROPHILS NFR BLD AUTO: 41.2 % (ref 42.7–76)
NRBC BLD AUTO-RTO: 0 /100 WBC (ref 0–0.2)
PLATELET # BLD AUTO: 197 10*3/MM3 (ref 140–450)
PMV BLD AUTO: 11.1 FL (ref 6–12)
POTASSIUM SERPL-SCNC: 4.1 MMOL/L (ref 3.5–5.2)
PREALB SERPL-MCNC: 15.4 MG/DL (ref 20–40)
PROT SERPL-MCNC: 7.7 G/DL (ref 6–8.5)
RBC # BLD AUTO: 4.28 10*6/MM3 (ref 3.77–5.28)
SODIUM SERPL-SCNC: 142 MMOL/L (ref 136–145)
WBC NRBC COR # BLD AUTO: 5.65 10*3/MM3 (ref 3.4–10.8)

## 2024-08-30 PROCEDURE — 82306 VITAMIN D 25 HYDROXY: CPT

## 2024-08-30 PROCEDURE — 83540 ASSAY OF IRON: CPT

## 2024-08-30 PROCEDURE — 80053 COMPREHEN METABOLIC PANEL: CPT

## 2024-08-30 PROCEDURE — 82728 ASSAY OF FERRITIN: CPT

## 2024-08-30 PROCEDURE — 82746 ASSAY OF FOLIC ACID SERUM: CPT

## 2024-08-30 PROCEDURE — 84134 ASSAY OF PREALBUMIN: CPT

## 2024-08-30 PROCEDURE — 85025 COMPLETE CBC W/AUTO DIFF WBC: CPT

## 2024-08-30 PROCEDURE — 84425 ASSAY OF VITAMIN B-1: CPT

## 2024-08-30 PROCEDURE — 83921 ORGANIC ACID SINGLE QUANT: CPT

## 2024-08-30 PROCEDURE — 36415 COLL VENOUS BLD VENIPUNCTURE: CPT

## 2024-09-04 LAB
METHYLMALONATE SERPL-SCNC: 150 NMOL/L (ref 0–378)
VIT B1 BLD-SCNC: 125.3 NMOL/L (ref 66.5–200)

## 2024-10-29 ENCOUNTER — TELEPHONE (OUTPATIENT)
Dept: OBSTETRICS AND GYNECOLOGY | Facility: CLINIC | Age: 49
End: 2024-10-29
Payer: COMMERCIAL

## 2024-10-29 NOTE — TELEPHONE ENCOUNTER
Attempted to call patient, no answer phone just rang, unable to leave voicemail. If patient calls in ok to reschedule.

## 2025-02-27 NOTE — PROGRESS NOTES
Primary Care Provider  Bala Thompson APRN   Referring Provider  No ref. provider found      Patient Complaint  Asthma and Follow-up (6 month)      Subjective          Mere Ivory presents to [facility]      History of Presenting Illness  Mere Ivory is a 49 y.o. female patient of Dr. Lorenz with history of asthma, eosinophilia, BRANDT, and tobacco use in remission, here for 6 month follow-up.    Patient states she is doing very well since her last visit.  She denies using any antibiotics or steroids for her lungs recently, denies any fevers or chills, no ER visits or hospitalizations for her breathing since she was last seen.  Her last hospitalization for her breathing was February 2023.  Patient continues to use Advair daily as well as her albuterol inhaler and DuoNeb treatments as needed.  She never has to use her as needed medications.  She is wondering if she can do a trial discontinuation of Advair as her breathing has been so stable.  She also takes Singulair and Zyrtec for allergies.  Patient wears her CPAP nightly.  She had bariatric surgery in July 2023.  She is a light former smoker, quit 13 years ago, 1 pack year.  Patient denies any productive cough, hemoptysis, swollen lymph nodes, weight loss, or night sweats.  Overall, patient is doing well and has no additional concerns at this time.  Patient is able to perform ADLs without difficulty.  I have personally reviewed the review of systems, past family, social, medical and surgical histories; and agree with their findings.    Pulmonary Meds  Advair  Albuterol inhaler  DuoNebs  Singulair  Zyrtec    Pulmonary equipment  CPAP      Review of Systems    Review of Systems   Constitutional:  Negative for activity change, chills, fatigue, fever, unexpected weight gain and unexpected weight loss.   HENT:  Negative for congestion, ear discharge, ear pain, mouth sores, postnasal drip,  rhinorrhea, sinus pressure, sore throat, swollen glands and trouble swallowing.    Eyes:  Negative for blurred vision, pain, discharge, itching and visual disturbance.   Respiratory:  Negative for apnea, cough, chest tightness, shortness of breath, wheezing and stridor.    Cardiovascular:  Negative for chest pain, palpitations and leg swelling.   Gastrointestinal:  Negative for abdominal distention, abdominal pain, constipation, diarrhea, nausea, vomiting, GERD and indigestion.   Musculoskeletal:  Negative for arthralgias, joint swelling and myalgias.   Skin:  Negative for color change.   Neurological:  Negative for dizziness, weakness, light-headedness and headache.      Sleep: Negative for Excessive daytime sleepiness  Negative for morning headaches  Negative for Snoring      Family History   Problem Relation Age of Onset    Hypertension Father     Diabetes Father     Diabetes Mother     Hypertension Mother     Obesity Mother     Arthritis Mother     Kidney disease Mother     Stroke Neg Hx     Malig Hyperthermia Neg Hx     Breast cancer Neg Hx     Ovarian cancer Neg Hx     Uterine cancer Neg Hx     Colon cancer Neg Hx     Melanoma Neg Hx     Prostate cancer Neg Hx         Social History     Socioeconomic History    Marital status:     Number of children: 0   Tobacco Use    Smoking status: Former     Current packs/day: 0.00     Average packs/day: 0.3 packs/day for 5.2 years (1.3 ttl pk-yrs)     Types: Cigarettes     Start date: 2007     Quit date: 2012     Years since quittin.1     Passive exposure: Past    Smokeless tobacco: Never    Tobacco comments:     QUIT 2012 WAS A SOCIAL SMOKER FOR 18 YEARS   Vaping Use    Vaping status: Never Used   Substance and Sexual Activity    Alcohol use: Not Currently     Comment: RARELY    Drug use: Never    Sexual activity: Yes     Partners: Male     Birth control/protection: None        Past Medical History:   Diagnosis Date    Achilles tendon rupture, right,  subsequent encounter     Acute respiratory failure with hypoxemia 02/28/2023    Arthritis     Arthritis 09/23/2021    Asthma     Asthma 06/13/2023    HTN (hypertension)     Irregular menstrual cycle 04/18/2023    BRANDT on CPAP 06/13/2023    PCOS (polycystic ovarian syndrome)     PONV (postoperative nausea and vomiting)         Immunization History   Administered Date(s) Administered    COVID-19 (PFIZER) Purple Cap Monovalent 03/10/2021, 03/31/2021    Flu Vaccine Quad PF >36MO 10/13/2020    Flublok 18+yrs 10/09/2023    Fluzone  >6mos 10/08/2024    Fluzone (or Fluarix & Flulaval for VFC) >6mos 10/13/2020, 09/23/2021, 12/02/2022    Influenza, Unspecified 10/01/2019    PPD Test 11/20/2019, 04/21/2021, 08/20/2022    Tdap 11/01/2019       No Known Allergies       Current Outpatient Medications:     albuterol sulfate  (90 Base) MCG/ACT inhaler, Inhale 2 puffs Every 4 (Four) Hours As Needed for Wheezing., Disp: 18 g, Rfl: 11    cetirizine (zyrTEC) 10 MG tablet, Take 1 tablet by mouth Daily., Disp: 90 tablet, Rfl: 3    Fluticasone-Salmeterol (ADVAIR/WIXELA) 250-50 MCG/ACT DISKUS, Inhale 1 puff 2 (Two) Times a Day. Rinse mouth with water and spit, Disp: 60 each, Rfl: 11    ipratropium-albuterol (DUO-NEB) 0.5-2.5 mg/3 ml nebulizer, Take 3 mL by nebulization Every 4 (Four) Hours as needed for shortness of air or wheezing, Disp: 360 mL, Rfl: 5    losartan (COZAAR) 100 MG tablet, Take 1 tablet by mouth Daily., Disp: 90 tablet, Rfl: 1    montelukast (SINGULAIR) 10 MG tablet, Take 1 tablet by mouth Every Night., Disp: 90 tablet, Rfl: 3    multivitamin with minerals tablet tablet, Take 1 tablet by mouth Daily., Disp: , Rfl:     Tirzepatide (MOUNJARO) 7.5 MG/0.5ML solution pen-injector pen, Inject 0.5 mL under the skin into the appropriate area as directed 1 (One) Time Per Week. (Patient not taking: Reported on 2/28/2025), Disp: 2 mL, Rfl: 5    Tirzepatide-Weight Management (ZEPBOUND) 10 MG/0.5ML solution auto-injector, Inject  "0.5 mL under the skin into the appropriate area as directed 1 (One) Time Per Week. (Patient not taking: Reported on 2/28/2025), Disp: 2 mL, Rfl: 0     Objective     Vital Signs:   /99 (BP Location: Right arm, Patient Position: Sitting, Cuff Size: Adult)   Pulse 74   Temp 98.1 °F (36.7 °C) (Oral)   Resp 16   Ht 172.7 cm (67.99\")   Wt 102 kg (225 lb 12.8 oz)   SpO2 100% Comment: room air  BMI 34.34 kg/m²     Physical Exam  Constitutional:       General: She is not in acute distress.     Appearance: Normal appearance. She is obese. She is not ill-appearing.   HENT:      Right Ear: Tympanic membrane and ear canal normal.      Left Ear: Tympanic membrane and ear canal normal.      Nose: Nose normal.      Mouth/Throat:      Mouth: Mucous membranes are moist.      Pharynx: Oropharynx is clear.   Eyes:      Extraocular Movements: Extraocular movements intact.      Conjunctiva/sclera: Conjunctivae normal.      Pupils: Pupils are equal, round, and reactive to light.   Cardiovascular:      Rate and Rhythm: Normal rate and regular rhythm.      Pulses: Normal pulses.      Heart sounds: Normal heart sounds.   Pulmonary:      Effort: Pulmonary effort is normal. No respiratory distress.      Breath sounds: Normal breath sounds. No stridor. No wheezing, rhonchi or rales.   Abdominal:      General: Bowel sounds are normal.      Palpations: Abdomen is soft.   Musculoskeletal:         General: No swelling. Normal range of motion.      Cervical back: Normal range of motion and neck supple.      Right lower leg: No edema.      Left lower leg: No edema.   Skin:     General: Skin is warm and dry.   Neurological:      General: No focal deficit present.      Mental Status: She is alert and oriented to person, place, and time.      Motor: No weakness.   Psychiatric:         Mood and Affect: Mood normal.         Behavior: Behavior normal.        Result Review :   I have personally reviewed patient's labs and images.  I also " reviewed my last progress note 6/24/2024.    -PFTs 4/6/2023 showed normal spirometry without significant response to bronchodilator, normal lung volumes, and normal DLCO after adjusting for [facility]  -CT chest 2/9/2023 negative for PE, no acute cardiopulmonary processes.  There was mild, nonspecific mediastinal lymphadenopathy.  -Peripheral eosinophilia most recently 1480 on 4/24/2024  -IgE elevated 42.9 on 2/28/2023       Diagnoses and all orders for this visit:    1. History of asthma (Primary)    2. BRANDT (obstructive sleep apnea)    3. Peripheral eosinophilia    4. Elevated IgE level    5. Dyspnea, unspecified type    6. Tobacco abuse, in remission      Impression and Plan    -Continue wearing CPAP nightly, managed by sleep medicine  -Patient has been using Advair regularly but is wondering if she can try discontinuing it as her symptoms have been so good.  Okay for patient to trial how she does without Advair, recommend taking once a day for 2 to 4 weeks and then tapering off if she does well.  If patient begins to experience more asthma symptoms, will need to restart.  -Continue using albuterol inhaler and DuoNeb treatments as needed  -Continue taking Singulair and Zyrtec for allergies.  Patient interested in discontinuing Singulair as well, can discuss at next visit.  -Follow-up with Dr. Lorenz or myself in 6 months, may return sooner if needed    Smoking status: Reviewed  Vaccination status: Patient reports she is up-to-date with her flu and Covid vaccines.  Patient is advised to continue to follow CDC recommendations such as social distancing wearing a mask and washing hands for at least 20 seconds.  Medications personally reviewed    Follow Up   No follow-ups on file.  Patient was given instructions and counseling regarding her condition or for health maintenance advice. Please see specific information pulled into the AVS if appropriate.

## 2025-02-28 ENCOUNTER — OFFICE VISIT (OUTPATIENT)
Dept: PULMONOLOGY | Facility: CLINIC | Age: 50
End: 2025-02-28

## 2025-02-28 VITALS
SYSTOLIC BLOOD PRESSURE: 137 MMHG | WEIGHT: 225.8 LBS | OXYGEN SATURATION: 100 % | HEIGHT: 68 IN | RESPIRATION RATE: 16 BRPM | HEART RATE: 74 BPM | BODY MASS INDEX: 34.22 KG/M2 | TEMPERATURE: 98.1 F | DIASTOLIC BLOOD PRESSURE: 99 MMHG

## 2025-02-28 DIAGNOSIS — R76.8 ELEVATED IGE LEVEL: ICD-10-CM

## 2025-02-28 DIAGNOSIS — R06.00 DYSPNEA, UNSPECIFIED TYPE: ICD-10-CM

## 2025-02-28 DIAGNOSIS — F17.201 TOBACCO ABUSE, IN REMISSION: ICD-10-CM

## 2025-02-28 DIAGNOSIS — G47.33 OSA (OBSTRUCTIVE SLEEP APNEA): ICD-10-CM

## 2025-02-28 DIAGNOSIS — Z87.09 HISTORY OF ASTHMA: Primary | ICD-10-CM

## 2025-02-28 DIAGNOSIS — D72.19 PERIPHERAL EOSINOPHILIA: ICD-10-CM

## 2025-03-03 ENCOUNTER — TELEMEDICINE (OUTPATIENT)
Dept: BARIATRICS/WEIGHT MGMT | Facility: CLINIC | Age: 50
End: 2025-03-03

## 2025-03-03 VITALS — WEIGHT: 225 LBS | HEIGHT: 68 IN | BODY MASS INDEX: 34.1 KG/M2

## 2025-03-03 DIAGNOSIS — Z98.84 S/P LAPAROSCOPIC SLEEVE GASTRECTOMY: ICD-10-CM

## 2025-03-03 DIAGNOSIS — E66.811 OBESITY, CLASS I, BMI 30-34.9: Primary | ICD-10-CM

## 2025-03-03 DIAGNOSIS — Z71.3 DIETARY COUNSELING: ICD-10-CM

## 2025-03-03 PROBLEM — E66.812 OBESITY, CLASS II, BMI 35-39.9: Status: RESOLVED | Noted: 2024-01-16 | Resolved: 2025-03-03

## 2025-03-03 NOTE — PROGRESS NOTES
[facility]  [facility]  [address]  [facility] [id number] [phone]  [address]  [facility] [id number] [phone]  Dept: [phone]  3/3/2025      Mere Ivory.  [id number]  [id number]  1975  female      Chief Complaint   Patient presents with    Follow-up     Sleeve, rx       This service was conducted via "[employer]  I am located at [facility] at [address].  The patient is located at home.    You have chosen to receive care through a video visit. Do you consent to use a video visit for your medical care today? Yes    BH Post-Op Bariatric Surgery:   Mere Ivory is status post Laparoscopic Sleeve procedure, performed on 7/17/2023     HPI:   Today's weight is 102 kg (225 lb) pounds, today's BMI is Body mass index is 34.22 kg/m²., has a  loss of 9 pounds since the last visit and weight loss since surgery is 132 pounds. The patient reports a decreased portion size and loss of appetite.      Mere Ivory denies n/v/r/d and reports tolerating regular diet.  Finished school. Working on keeping protein up.  Hasn't had Zepbound for last month due to Zepbound due to cost.    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   Some epigastric pain r/t gas.  Takes otc prn.  Would like to get to 100 g protein.  Eating protein bars and drinking shakes.  Snacking on high protein options.  Working towards meeting water goal.    Has found herself eating more frequently and food noise has increased since being off medication.  Lowest weight was 211#.    Diet and Exercise: Diet history reviewed and discussed with the patient. Weight loss/gains to date discussed with the patient. The patient states they are eating 70+ grams of protein per day. She reports eating 2 meals per day, a typical portion size of 1 cup, eating 3-4 snacks per day, drinking  40+ or more 8-oz. glasses of water per day, no carbonated beverage consumption and exercising regularly.     Supplements: mtv with iron.     Review of Systems   Constitutional:  Positive for activity change and appetite change.   Respiratory:  Negative for shortness of breath.    Cardiovascular:  Negative for chest pain.   All other systems reviewed and are negative.      Patient Active Problem List   Diagnosis    Arthritis    Hypertension    Dietary counseling    Multiple joint pain    Snoring    Heartburn    PCOS (polycystic ovarian syndrome)    Borderline hyperlipidemia    Asthma    BRANDT on CPAP    S/P laparoscopic sleeve gastrectomy    Chronic fatigue    Positive colorectal cancer screening using Cologuard test    Obesity, Class I, BMI 30-34.9       Past Medical History:   Diagnosis Date    Achilles tendon rupture, right, subsequent encounter     Acute respiratory failure with hypoxemia 02/28/2023    Arthritis     Arthritis 09/23/2021    Asthma     Asthma 06/13/2023    HTN (hypertension)     Irregular menstrual cycle 04/18/2023    BRANDT on CPAP 06/13/2023    PCOS (polycystic ovarian syndrome)     PONV (postoperative nausea and vomiting)        The following portions of the patient's history were reviewed and updated as appropriate: allergies, current medications, past medical history, past surgical history, and problem list.    There were no vitals filed for this visit.    Physical Exam  Vitals reviewed.   Constitutional:       General: She is not in acute distress.     Appearance: Normal appearance. She is obese.   HENT:      Head: Normocephalic and atraumatic.      Mouth/Throat:      Mouth: Mucous membranes are moist.      Pharynx: Oropharynx is clear.   Eyes:      General: No scleral icterus.     Extraocular Movements: Extraocular movements intact.      Conjunctiva/sclera: Conjunctivae normal.      Pupils: Pupils are equal, round, and reactive to light.   Cardiovascular:      Rate and Rhythm: Normal rate and  regular rhythm.   Pulmonary:      Effort: Pulmonary effort is normal. No respiratory distress.   Abdominal:      General: Bowel sounds are normal.      Palpations: Abdomen is soft.   Musculoskeletal:         General: Normal range of motion.      Cervical back: Normal range of motion and neck supple.   Skin:     General: Skin is warm and dry.   Neurological:      General: No focal deficit present.      Mental Status: She is alert and oriented to person, place, and time.   Psychiatric:         Mood and Affect: Mood normal.         Behavior: Behavior normal.         Thought Content: Thought content normal.         Judgment: Judgment normal.         Assessment:   Post-op, the patient working on exercising.  Working in ED prn until credentialing.  Stopped Zepbound about a month ago due to price.    Plan:   Diagnoses and all orders for this visit:    1. Obesity, Class I, BMI 30-34.9 (Primary)    2. S/P laparoscopic sleeve gastrectomy    3. Dietary counseling      Encouraged patient to be sure to get plenty of lean protein per day through small frequent meals all with a protein source.   Activity restrictions: none.   Recommended patient be sure to get at least 70 grams of protein per day by eating small, frequent meals all with high lean protein choices. Be sure to limit/cut back on daily carbohydrate intake. Discussed with the patient the recommended amount of water per day to intake- half of body weight in ounces. Reviewed vitamin requirements. Be sure to do routine exercise, 150 minutes per week minimum, including both cardio and strength training.     Instructions / Recommendations: dietary counseling recommended, recommended a daily protein intake of  grams, vitamin supplement(s) recommended, recommended exercising at least 150 minutes per week, behavior modifications recommended and instructed to call the office for concerns, questions, or problems.     The patient was instructed to follow up in 6 months .      Total time spent face to face was 25 minutes and 20 minutes was spent counseling.   This was an audio and video enabled telemedicine encounter.

## 2025-04-11 ENCOUNTER — PATIENT MESSAGE (OUTPATIENT)
Dept: SLEEP MEDICINE | Facility: HOSPITAL | Age: 50
End: 2025-04-11

## 2025-05-05 ENCOUNTER — OFFICE VISIT (OUTPATIENT)
Dept: FAMILY MEDICINE CLINIC | Facility: CLINIC | Age: 50
End: 2025-05-05
Payer: COMMERCIAL

## 2025-05-05 VITALS
DIASTOLIC BLOOD PRESSURE: 84 MMHG | RESPIRATION RATE: 18 BRPM | HEART RATE: 78 BPM | OXYGEN SATURATION: 100 % | HEIGHT: 68 IN | TEMPERATURE: 97.8 F | WEIGHT: 239.2 LBS | SYSTOLIC BLOOD PRESSURE: 116 MMHG | BODY MASS INDEX: 36.25 KG/M2

## 2025-05-05 DIAGNOSIS — Z51.81 MEDICATION MONITORING ENCOUNTER: ICD-10-CM

## 2025-05-05 DIAGNOSIS — Z00.00 ANNUAL PHYSICAL EXAM: Primary | ICD-10-CM

## 2025-05-05 DIAGNOSIS — R60.0 LOCALIZED EDEMA: ICD-10-CM

## 2025-05-05 DIAGNOSIS — E66.811 OBESITY, CLASS I, BMI 30-34.9: ICD-10-CM

## 2025-05-05 PROCEDURE — 80305 DRUG TEST PRSMV DIR OPT OBS: CPT | Performed by: NURSE PRACTITIONER

## 2025-05-05 PROCEDURE — 99396 PREV VISIT EST AGE 40-64: CPT | Performed by: NURSE PRACTITIONER

## 2025-05-05 RX ORDER — PHENTERMINE HYDROCHLORIDE 37.5 MG/1
37.5 CAPSULE ORAL EVERY MORNING
Qty: 30 CAPSULE | Refills: 0 | Status: CANCELLED | OUTPATIENT
Start: 2025-05-05

## 2025-05-05 RX ORDER — HYDROCHLOROTHIAZIDE 12.5 MG/1
12.5 TABLET ORAL DAILY PRN
Qty: 90 TABLET | Refills: 1 | Status: SHIPPED | OUTPATIENT
Start: 2025-05-05

## 2025-05-05 RX ORDER — PHENTERMINE HYDROCHLORIDE 37.5 MG/1
37.5 CAPSULE ORAL EVERY MORNING
Qty: 30 CAPSULE | Refills: 0 | Status: SHIPPED | OUTPATIENT
Start: 2025-05-05

## 2025-05-05 NOTE — PROGRESS NOTES
Answers submitted by the patient for this visit:  Weight Management (Submitted on 5/5/2025)  Chief Complaint: Weight Management  Weight: increased  Weight loss treatment: low calorie, low carb diet, portion control, increasing exercise  Eating habit changes: No changes  Energy level: decreased  Physical activity tolerance: worse  Treatment barriers: insurance issues, medication cost, adherence to diet  Additional information: Increased Food noise, thinking about food, feeling like I need to eat something all the time  Chief Complaint  Obesity, Allergies, Hypertension, Abdominal Pain (Epigastric - intermittent - nothing that she can tell triggers it//), Annual Exam, and Edema (Would like rx for hctz)    Subjective        Mere Ivory presents to [facility]  History of Present Illness  Obesity:  would like to restart medication and had lost to 210.  But has been off for about 6 months.  Had bariatric surgery. Has gained to 239.  Would like to restart phentermine.Understands need to lose 4 pounds and the risk for PPH with taking phentermine.  She also understands to continue on will need to have a controlled blood pressure.    Seasonal allergies:  doing well on medication.    Hypertension:  well controlled. 116/84    Annual exam    Edema:  swelling and would like to restart HCTZ.    Abdominal pain and epigastric pain  Obesity  Symptoms include abdominal pain.    Pertinent negative symptoms include no chest pain, no cough, no fever, no numbness and no weakness.   Allergies  Symptoms include abdominal pain.    Pertinent negative symptoms include no chest pain, no cough, no fever, no numbness and no weakness.   Hypertension  Associated symptoms: no chest pain, no palpitations, no shortness of breath and no dizziness    Abdominal Pain  Associated symptoms: no fever    Weight Management  Weight:  Increased  Weight loss treatment:  Low calorie, low carb diet, portion control and  increasing exercise  Treatment barriers:  Insurance issues, medication cost and adherence to diet  Physical activity tolerance:  Worse  Energy level:  Decreased  Additional information:  Increased Food noise, thinking about food, feeling like I need to eat something all the time      The following portions of the patient's history were personally reviewed and updated as appropriate: allergies, current medications, past medical history, past surgical history, past family history, and past social history.     Body mass index is 36.38 kg/m².           Past History:    Medical History: has a past medical history of Achilles tendon rupture, right, subsequent encounter, Acute respiratory failure with hypoxemia (02/28/2023), Arthritis, Arthritis (09/23/2021), Asthma, Asthma (06/13/2023), HTN (hypertension), Irregular menstrual cycle (04/18/2023), BRANDT on CPAP (06/13/2023), PCOS (polycystic ovarian syndrome), and PONV (postoperative nausea and vomiting).     Surgical History: has a past surgical history that includes LASIK (2011); Achilles tendon surgery (Right, 03/18/2022); Gastric Sleeve (N/A, 07/17/2023); Bariatric Surgery (07/2023); and Colonoscopy (N/A, 7/25/2024).     Family History: family history includes Arthritis in her mother; Diabetes in her father and mother; Hypertension in her father and mother; Kidney disease in her mother; Obesity in her mother.     Social History: reports that she quit smoking about 13 years ago. Her smoking use included cigarettes. She started smoking about 18 years ago. She has a 1.5 pack-year smoking history. She has been exposed to tobacco smoke. She has never used smokeless tobacco. She reports that she does not currently use alcohol. She reports that she does not use drugs.    Allergies: Patient has no known allergies.          Current Outpatient Medications:     albuterol sulfate  (90 Base) MCG/ACT inhaler, Inhale 2 puffs Every 4 (Four) Hours As Needed for Wheezing., Disp: 18  "g, Rfl: 11    cetirizine (zyrTEC) 10 MG tablet, Take 1 tablet by mouth Daily., Disp: 90 tablet, Rfl: 3    Fluticasone-Salmeterol (ADVAIR/WIXELA) 250-50 MCG/ACT DISKUS, Inhale 1 puff 2 (Two) Times a Day. Rinse mouth with water and spit, Disp: 60 each, Rfl: 11    ipratropium-albuterol (DUO-NEB) 0.5-2.5 mg/3 ml nebulizer, Take 3 mL by nebulization Every 4 (Four) Hours as needed for shortness of air or wheezing, Disp: 360 mL, Rfl: 5    losartan (COZAAR) 100 MG tablet, Take 1 tablet by mouth Daily., Disp: 90 tablet, Rfl: 1    montelukast (SINGULAIR) 10 MG tablet, Take 1 tablet by mouth Every Night., Disp: 90 tablet, Rfl: 3    multivitamin with minerals tablet tablet, Take 1 tablet by mouth Daily., Disp: , Rfl:     hydroCHLOROthiazide 12.5 MG tablet, Take 1 tablet by mouth Daily As Needed (swelling)., Disp: 90 tablet, Rfl: 1    There are no discontinued medications.      Review of Systems   Constitutional:  Negative for fever.   Respiratory:  Negative for cough and shortness of breath.    Cardiovascular:  Negative for chest pain, palpitations and leg swelling.   Gastrointestinal:  Positive for abdominal pain.   Neurological:  Negative for dizziness, weakness, numbness and headache.        Objective         Vitals:    05/05/25 0801   BP: 116/84   BP Location: Right arm   Patient Position: Sitting   Cuff Size: Large Adult   Pulse: 78   Resp: 18   Temp: 97.8 °F (36.6 °C)   TempSrc: Temporal   SpO2: 100%   Weight: 109 kg (239 lb 3.2 oz)   Height: 172.7 cm (67.99\")     Body mass index is 36.38 kg/m².         Physical Exam  Vitals reviewed.   Constitutional:       Appearance: Normal appearance. She is well-developed.   HENT:      Head: Normocephalic and atraumatic.      Right Ear: Tympanic membrane normal.      Left Ear: Tympanic membrane normal.      Mouth/Throat:      Pharynx: No oropharyngeal exudate.   Eyes:      Conjunctiva/sclera: Conjunctivae normal.      Pupils: Pupils are equal, round, and reactive to light. "   Cardiovascular:      Rate and Rhythm: Normal rate and regular rhythm.      Heart sounds: Normal heart sounds. No murmur heard.     No friction rub. No gallop.   Pulmonary:      Effort: Pulmonary effort is normal.      Breath sounds: Normal breath sounds. No wheezing or rhonchi.   Abdominal:      General: Bowel sounds are normal.      Palpations: Abdomen is soft.      Tenderness: There is no abdominal tenderness.   Musculoskeletal:         General: Normal range of motion.      Cervical back: Normal range of motion.   Skin:     General: Skin is warm and dry.   Neurological:      Mental Status: She is alert and oriented to person, place, and time.   Psychiatric:         Mood and Affect: Mood and affect normal.         Behavior: Behavior normal.         Thought Content: Thought content normal.         Judgment: Judgment normal.             Result Review :               Assessment and Plan     Diagnoses and all orders for this visit:    1. Annual physical exam (Primary)  Comments:  discussed diet and exercise.  Orders:  -     Comprehensive Metabolic Panel; Future  -     Lipid Panel With / Chol / HDL Ratio; Future  -     Urinalysis With Culture If Indicated -; Future  -     CBC & Differential; Future  -     TSH Rfx On Abnormal To Free T4; Future    2. Medication monitoring encounter  -     POC Medline 12 Panel Urine Drug Screen    3. Localized edema  -     hydroCHLOROthiazide 12.5 MG tablet; Take 1 tablet by mouth Daily As Needed (swelling).  Dispense: 90 tablet; Refill: 1    4. Obesity, Class I, BMI 30-34.9              Follow Up     Return in about 1 month (around 6/5/2025).    Patient was given instructions and counseling regarding her condition or for health maintenance advice. Please see specific information pulled into the AVS if appropriate.

## 2025-05-06 ENCOUNTER — OFFICE VISIT (OUTPATIENT)
Dept: SLEEP MEDICINE | Facility: HOSPITAL | Age: 50
End: 2025-05-06
Payer: COMMERCIAL

## 2025-05-06 VITALS — HEART RATE: 76 BPM | OXYGEN SATURATION: 100 % | HEIGHT: 68 IN | WEIGHT: 238 LBS | BODY MASS INDEX: 36.07 KG/M2

## 2025-05-06 DIAGNOSIS — G47.33 SEVERE OBSTRUCTIVE SLEEP APNEA: Primary | ICD-10-CM

## 2025-05-06 PROCEDURE — G0463 HOSPITAL OUTPT CLINIC VISIT: HCPCS

## 2025-05-06 PROCEDURE — 99213 OFFICE O/P EST LOW 20 MIN: CPT | Performed by: NURSE PRACTITIONER

## 2025-05-06 NOTE — PROGRESS NOTES
"  [address] [address]  Phone: [phone]  Fax: [phone]       SLEEP CLINIC FOLLOW-UP PROGRESS NOTE    Mere Ivory  [id number]   1975  49 y.o.  female      PCP: Bala Thompson APRN    DATE OF VISIT: 5/6/2025          CHIEF COMPLAINT: Severe obstructive sleep apnea    HPI:  This is a 49 y.o. year old patient who presents to the clinic today for the management of obstructive sleep apnea.  Patient had a(n) split-night in lab polysomnogram sleep study 5/2023 showing severe obstructive sleep apnea with AHI of 31.7/hr.  This patient is using positive airway pressure therapy with PAP at set pressure of 14 cm H2O.      Patient now presents today for 1 year follow-up.  She has had some weight loss since previous sleep study, however still has symptoms of sleep apnea if goes without PAP device, still strongly feels she benefits from use of PAP device.  Patient's sleep apnea has improved with this therapy with residual AHI 2.9/hr.   Average usage is 5 hours 39 minutes per night on nights used.   Patient tolerating device well and improved with treatment.  Did recommend try to increase usage and using all night every night when possible.          MEDICATIONS: reviewed     ALLERGIES:  Patient has no known allergies.    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   HEAD: atraumatic, normocephalic  RESP SYSTEM: not in respiratory distress, breathing unlabored  CARDIOVASULAR: normal rate, no edema noted "   NEURO: Alert and oriented x 3, mood and affect appeared appropriate      DATA REVIEWED:  The PAP compliance summary downloaded on 4/28/2025 has been reviewed independently by me and discussed with the patient.   Compliance: 67%  More than 4 hr use: 60%  Average use of the device: 5 hours 39 minutes per night  Residual AHI: 2.9/hr (goal < 5.0 /hr)  Device: ResMed air sense 11  Mask type: Fullface  DME: [employer]          ASSESSMENT AND PLAN:  Obstructive Sleep Apnea: sleep apnea has improved with the device and treatment.   I have personally reviewed the smart card download and discussed the download data with the patient and encouarged continued use of the device.  The residual AHI is acceptable. The device is benefiting the patient and the device is medically necessary.  Recommend patient get supplies from the [employer] company, change them on a regular basis, and clean as directed.  A prescription for supplies has been sent to the DME company.  Reviewed risks of untreated or poorly treated sleep apnea including but not limited to cardiopulmonary and cerebrovascular risks.  Patient still feeling benefit from PAP device, continues to have symptoms of sleep apnea if not using.  Recommend increasing usage of PAP device, most insurances require minimum usage of 4 hours per night at least 70% of the time, would recommend using all night every night if possible for optimum health.  Recommend prioritizing sleep to aid in overall health.  Patient denies issues tolerating PAP, sometimes falls asleep before putting on.  We discussed possibly putting on even if still reading or watching TV to avoid falling asleep without it.  Do not drive or operate heavy machinery or do activities that require high concentration if feeling tired or drowsy.  Obesity: Body mass index is 36.2 kg/m².. Patients who are overweight or obese are at increased risk of sleep apnea/ sleep disordered breathing. Weight reduction and healthy lifestyle are  encouraged in overweight/ obese patients as part of a comprehensive approach to sleep apnea treatment.   We did discuss that not all sleep apnea is weight related, some patients may have sleep apnea regardless of their weight.  While we would not recommend assuming sleep apnea goes away with weight loss, if patient does have weight loss with improvement in symptoms in the future could consider retesting if indicated at that time.  Patient will let us know if this were to occur prior to next visit.      Patient will follow-up in 1 year or follow-up sooner for any issues or concerns.  Patient's questions were answered.          Thank you for allowing me to participate in the care of this patient.     Paty Bailey DNP, [facility]

## 2025-06-02 ENCOUNTER — LAB (OUTPATIENT)
Dept: LAB | Facility: HOSPITAL | Age: 50
End: 2025-06-02
Payer: COMMERCIAL

## 2025-06-02 DIAGNOSIS — Z00.00 ANNUAL PHYSICAL EXAM: ICD-10-CM

## 2025-06-02 LAB
ALBUMIN SERPL-MCNC: 3.8 G/DL (ref 3.5–5.2)
ALBUMIN/GLOB SERPL: 1.2 G/DL
ALP SERPL-CCNC: 75 U/L (ref 39–117)
ALT SERPL W P-5'-P-CCNC: 23 U/L (ref 1–33)
ANION GAP SERPL CALCULATED.3IONS-SCNC: 8 MMOL/L (ref 5–15)
ANISOCYTOSIS BLD QL: ABNORMAL
AST SERPL-CCNC: 22 U/L (ref 1–32)
BACTERIA UR QL AUTO: ABNORMAL /HPF
BASOPHILS # BLD MANUAL: 0 10*3/MM3 (ref 0–0.2)
BASOPHILS NFR BLD MANUAL: 0 % (ref 0–1.5)
BILIRUB SERPL-MCNC: 0.8 MG/DL (ref 0–1.2)
BILIRUB UR QL STRIP: NEGATIVE
BUN SERPL-MCNC: 12 MG/DL (ref 6–20)
BUN/CREAT SERPL: 22.2 (ref 7–25)
CALCIUM SPEC-SCNC: 9.3 MG/DL (ref 8.6–10.5)
CHLORIDE SERPL-SCNC: 106 MMOL/L (ref 98–107)
CHOLEST SERPL-MCNC: 137 MG/DL (ref 0–200)
CLARITY UR: CLEAR
CO2 SERPL-SCNC: 26 MMOL/L (ref 22–29)
COLOR UR: YELLOW
CREAT SERPL-MCNC: 0.54 MG/DL (ref 0.57–1)
DEPRECATED RDW RBC AUTO: 40.3 FL (ref 37–54)
EGFRCR SERPLBLD CKD-EPI 2021: 111.6 ML/MIN/1.73
EOSINOPHIL # BLD MANUAL: 1.09 10*3/MM3 (ref 0–0.4)
EOSINOPHIL NFR BLD MANUAL: 22 % (ref 0.3–6.2)
ERYTHROCYTE [DISTWIDTH] IN BLOOD BY AUTOMATED COUNT: 12.7 % (ref 12.3–15.4)
GLOBULIN UR ELPH-MCNC: 3.3 GM/DL
GLUCOSE SERPL-MCNC: 88 MG/DL (ref 65–99)
GLUCOSE UR STRIP-MCNC: NEGATIVE MG/DL
HCT VFR BLD AUTO: 34.3 % (ref 34–46.6)
HDLC SERPL QL: 2.91
HDLC SERPL-MCNC: 47 MG/DL (ref 40–60)
HGB BLD-MCNC: 11.8 G/DL (ref 12–15.9)
HGB UR QL STRIP.AUTO: NEGATIVE
HOLD SPECIMEN: NORMAL
HYALINE CASTS UR QL AUTO: ABNORMAL /LPF
KETONES UR QL STRIP: NEGATIVE
LDLC SERPL CALC-MCNC: 76 MG/DL (ref 0–100)
LEUKOCYTE ESTERASE UR QL STRIP.AUTO: ABNORMAL
LYMPHOCYTES # BLD MANUAL: 1.09 10*3/MM3 (ref 0.7–3.1)
LYMPHOCYTES NFR BLD MANUAL: 5 % (ref 5–12)
MCH RBC QN AUTO: 30.1 PG (ref 26.6–33)
MCHC RBC AUTO-ENTMCNC: 34.4 G/DL (ref 31.5–35.7)
MCV RBC AUTO: 87.5 FL (ref 79–97)
MONOCYTES # BLD: 0.25 10*3/MM3 (ref 0.1–0.9)
NEUTROPHILS # BLD AUTO: 2.52 10*3/MM3 (ref 1.7–7)
NEUTROPHILS NFR BLD MANUAL: 51 % (ref 42.7–76)
NITRITE UR QL STRIP: NEGATIVE
PH UR STRIP.AUTO: 6 [PH] (ref 5–8)
PLAT MORPH BLD: NORMAL
PLATELET # BLD AUTO: 200 10*3/MM3 (ref 140–450)
PMV BLD AUTO: 10.7 FL (ref 6–12)
POTASSIUM SERPL-SCNC: 4.7 MMOL/L (ref 3.5–5.2)
PROT SERPL-MCNC: 7.1 G/DL (ref 6–8.5)
PROT UR QL STRIP: NEGATIVE
RBC # BLD AUTO: 3.92 10*6/MM3 (ref 3.77–5.28)
RBC # UR STRIP: ABNORMAL /HPF
REF LAB TEST METHOD: ABNORMAL
SODIUM SERPL-SCNC: 140 MMOL/L (ref 136–145)
SP GR UR STRIP: 1.02 (ref 1–1.03)
SQUAMOUS #/AREA URNS HPF: ABNORMAL /HPF
TRIGL SERPL-MCNC: 72 MG/DL (ref 0–150)
TSH SERPL DL<=0.05 MIU/L-ACNC: 1.11 UIU/ML (ref 0.27–4.2)
UROBILINOGEN UR QL STRIP: ABNORMAL
VARIANT LYMPHS NFR BLD MANUAL: 22 % (ref 19.6–45.3)
VLDLC SERPL-MCNC: 14 MG/DL (ref 5–40)
WBC # UR STRIP: ABNORMAL /HPF
WBC MORPH BLD: NORMAL
WBC NRBC COR # BLD AUTO: 4.94 10*3/MM3 (ref 3.4–10.8)

## 2025-06-02 PROCEDURE — 80061 LIPID PANEL: CPT

## 2025-06-02 PROCEDURE — 81001 URINALYSIS AUTO W/SCOPE: CPT

## 2025-06-02 PROCEDURE — 85025 COMPLETE CBC W/AUTO DIFF WBC: CPT

## 2025-06-02 PROCEDURE — 80053 COMPREHEN METABOLIC PANEL: CPT

## 2025-06-02 PROCEDURE — 84443 ASSAY THYROID STIM HORMONE: CPT

## 2025-06-02 PROCEDURE — 85007 BL SMEAR W/DIFF WBC COUNT: CPT

## 2025-06-09 ENCOUNTER — OFFICE VISIT (OUTPATIENT)
Dept: FAMILY MEDICINE CLINIC | Facility: CLINIC | Age: 50
End: 2025-06-09
Payer: COMMERCIAL

## 2025-06-09 VITALS
WEIGHT: 236.8 LBS | DIASTOLIC BLOOD PRESSURE: 88 MMHG | HEART RATE: 90 BPM | BODY MASS INDEX: 35.89 KG/M2 | HEIGHT: 68 IN | OXYGEN SATURATION: 98 % | TEMPERATURE: 96.9 F | SYSTOLIC BLOOD PRESSURE: 138 MMHG | RESPIRATION RATE: 18 BRPM

## 2025-06-09 DIAGNOSIS — F32.9 REACTIVE DEPRESSION: ICD-10-CM

## 2025-06-09 DIAGNOSIS — F41.9 ANXIETY: Primary | ICD-10-CM

## 2025-06-09 DIAGNOSIS — E66.811 OBESITY, CLASS I, BMI 30-34.9: ICD-10-CM

## 2025-06-09 DIAGNOSIS — I10 HYPERTENSION, UNSPECIFIED TYPE: ICD-10-CM

## 2025-06-09 PROCEDURE — 99213 OFFICE O/P EST LOW 20 MIN: CPT | Performed by: NURSE PRACTITIONER

## 2025-06-09 RX ORDER — PHENTERMINE HYDROCHLORIDE 37.5 MG/1
37.5 CAPSULE ORAL EVERY MORNING
Qty: 30 CAPSULE | Refills: 0 | Status: SHIPPED | OUTPATIENT
Start: 2025-06-09

## 2025-06-09 RX ORDER — ESCITALOPRAM OXALATE 5 MG/1
5 TABLET ORAL DAILY
Qty: 30 TABLET | Refills: 1 | Status: SHIPPED | OUTPATIENT
Start: 2025-06-09

## 2025-06-09 RX ORDER — BUPROPION HYDROCHLORIDE 150 MG/1
150 TABLET ORAL DAILY
Qty: 30 TABLET | Refills: 0 | Status: SHIPPED | OUTPATIENT
Start: 2025-06-09 | End: 2025-06-09

## 2025-06-09 NOTE — PROGRESS NOTES
Chief Complaint  Obesity, Anxiety (Feels it is getting worse is functional but feels she may need something or a referral), and Depression (Possible menopause )    Subjective        Mere Ivory presents to [facility]  History of Present Illness  Anxiety and depression: not sleeping due to worry and concerns.  And feels numb.  But does not have SI/HI.    Obesity:  denies chest pain or shortness of breath.  Notes that has lost 3 pounds and notes that is lowering foods  Obesity  Pertinent negative symptoms include no chest pain, no cough, no fever, no numbness and no weakness.   Anxiety  Initial visit:     PMH includes: depression      Symptoms: no chest pain, no dizziness, no palpitations and no shortness of breath    Depression    Symptoms: no chest pain, no dizziness, no palpitations and no shortness of breath      Nighttime awakenings:     PMH Includes: depression    Weight Management  Weight:  Unchanged  Weight loss treatment:  Prescription medications  Treatment barriers:  Medication cost and adherence to diet  Physical activity tolerance:  Stable  Energy level:  Decreased      The following portions of the patient's history were personally reviewed and updated as appropriate: allergies, current medications, past medical history, past surgical history, past family history, and past social history.     Body mass index is 36.01 kg/m².           Past History:    Medical History: has a past medical history of Achilles tendon rupture, right, subsequent encounter, Acute respiratory failure with hypoxemia (02/28/2023), Arthritis, Arthritis (09/23/2021), Asthma, Asthma (06/13/2023), HTN (hypertension), Irregular menstrual cycle (04/18/2023), BRANDT on CPAP (06/13/2023), PCOS (polycystic ovarian syndrome), and PONV (postoperative nausea and vomiting).     Surgical History: has a past surgical history that includes LASIK (2011); Achilles tendon surgery (Right, 03/18/2022); Gastric  Sleeve (N/A, 07/17/2023); Bariatric Surgery (07/2023); and Colonoscopy (N/A, 7/25/2024).     Family History: family history includes Arthritis in her mother; Diabetes in her father and mother; Hypertension in her father and mother; Kidney disease in her mother; Obesity in her mother.     Social History: reports that she quit smoking about 13 years ago. Her smoking use included cigarettes. She started smoking about 18 years ago. She has a 1.8 pack-year smoking history. She has been exposed to tobacco smoke. She has never used smokeless tobacco. She reports that she does not currently use alcohol. She reports that she does not use drugs.    Allergies: Patient has no known allergies.          Current Outpatient Medications:     albuterol sulfate  (90 Base) MCG/ACT inhaler, Inhale 2 puffs Every 4 (Four) Hours As Needed for Wheezing., Disp: 18 g, Rfl: 11    cetirizine (zyrTEC) 10 MG tablet, Take 1 tablet by mouth Daily., Disp: 90 tablet, Rfl: 3    Fluticasone-Salmeterol (ADVAIR/WIXELA) 250-50 MCG/ACT DISKUS, Inhale 1 puff 2 (Two) Times a Day. Rinse mouth with water and spit, Disp: 60 each, Rfl: 11    hydroCHLOROthiazide 12.5 MG tablet, Take 1 tablet by mouth Daily As Needed (swelling)., Disp: 90 tablet, Rfl: 1    ipratropium-albuterol (DUO-NEB) 0.5-2.5 mg/3 ml nebulizer, Take 3 mL by nebulization Every 4 (Four) Hours as needed for shortness of air or wheezing, Disp: 360 mL, Rfl: 5    losartan (COZAAR) 100 MG tablet, Take 1 tablet by mouth Daily., Disp: 90 tablet, Rfl: 1    montelukast (SINGULAIR) 10 MG tablet, Take 1 tablet by mouth Every Night., Disp: 90 tablet, Rfl: 3    multivitamin with minerals tablet tablet, Take 1 tablet by mouth Daily., Disp: , Rfl:     phentermine 37.5 MG capsule, Take 1 capsule by mouth Every Morning., Disp: 30 capsule, Rfl: 0    escitalopram (Lexapro) 5 MG tablet, Take 1 tablet by mouth Daily., Disp: 30 tablet, Rfl: 1    Medications Discontinued During This Encounter   Medication Reason  "   buPROPion XL (WELLBUTRIN XL) 150 MG 24 hr tablet *Therapy completed    phentermine 37.5 MG capsule Reorder         Review of Systems   Constitutional:  Negative for fever.   Respiratory:  Negative for cough and shortness of breath.    Cardiovascular:  Negative for chest pain, palpitations and leg swelling.   Neurological:  Negative for dizziness, weakness, numbness and headache.        Objective         Vitals:    06/09/25 0739   BP: 138/88   BP Location: Right arm   Patient Position: Sitting   Cuff Size: Adult   Pulse: 90   Resp: 18   Temp: 96.9 °F (36.1 °C)   TempSrc: Temporal   SpO2: 98%   Weight: 107 kg (236 lb 12.8 oz)   Height: 172.7 cm (67.99\")     Body mass index is 36.01 kg/m².         Physical Exam  Vitals reviewed.   Constitutional:       Appearance: Normal appearance. She is well-developed.   HENT:      Head: Normocephalic and atraumatic.      Mouth/Throat:      Pharynx: No oropharyngeal exudate.   Eyes:      Conjunctiva/sclera: Conjunctivae normal.      Pupils: Pupils are equal, round, and reactive to light.   Cardiovascular:      Rate and Rhythm: Normal rate and regular rhythm.      Heart sounds: Normal heart sounds. No murmur heard.     No friction rub. No gallop.   Pulmonary:      Effort: Pulmonary effort is normal.      Breath sounds: Normal breath sounds. No wheezing or rhonchi.   Skin:     General: Skin is warm and dry.   Neurological:      Mental Status: She is alert and oriented to person, place, and time.   Psychiatric:         Mood and Affect: Mood and affect normal.         Behavior: Behavior normal.         Thought Content: Thought content normal.         Judgment: Judgment normal.             Result Review :               Assessment and Plan     Diagnoses and all orders for this visit:    1. Anxiety (Primary)  -     escitalopram (Lexapro) 5 MG tablet; Take 1 tablet by mouth Daily.  Dispense: 30 tablet; Refill: 1    2. Obesity, Class I, BMI 30-34.9  -     phentermine 37.5 MG capsule; Take " 1 capsule by mouth Every Morning.  Dispense: 30 capsule; Refill: 0    3. Hypertension, unspecified type    4. Reactive depression  -     escitalopram (Lexapro) 5 MG tablet; Take 1 tablet by mouth Daily.  Dispense: 30 tablet; Refill: 1    Other orders  -     Discontinue: buPROPion XL (WELLBUTRIN XL) 150 MG 24 hr tablet; Take 1 tablet by mouth Daily for 30 days.  Dispense: 30 tablet; Refill: 0              Follow Up     Return in about 1 month (around 7/9/2025).    Patient was given instructions and counseling regarding her condition or for health maintenance advice. Please see specific information pulled into the AVS if appropriate.

## 2025-06-24 DIAGNOSIS — J96.01 ACUTE RESPIRATORY FAILURE WITH HYPOXEMIA: ICD-10-CM

## 2025-06-24 DIAGNOSIS — G47.33 OSA (OBSTRUCTIVE SLEEP APNEA): ICD-10-CM

## 2025-06-24 RX ORDER — CETIRIZINE HYDROCHLORIDE 10 MG/1
10 TABLET ORAL DAILY
Qty: 90 TABLET | Refills: 3 | Status: SHIPPED | OUTPATIENT
Start: 2025-06-24

## 2025-06-24 RX ORDER — FLUTICASONE PROPIONATE AND SALMETEROL 250; 50 UG/1; UG/1
1 POWDER RESPIRATORY (INHALATION)
Qty: 60 EACH | Refills: 11 | Status: SHIPPED | OUTPATIENT
Start: 2025-06-24

## 2025-06-24 RX ORDER — MONTELUKAST SODIUM 10 MG/1
10 TABLET ORAL NIGHTLY
Qty: 90 TABLET | Refills: 3 | Status: SHIPPED | OUTPATIENT
Start: 2025-06-24

## 2025-07-03 ENCOUNTER — OFFICE VISIT (OUTPATIENT)
Dept: FAMILY MEDICINE CLINIC | Facility: CLINIC | Age: 50
End: 2025-07-03
Payer: COMMERCIAL

## 2025-07-03 VITALS
DIASTOLIC BLOOD PRESSURE: 78 MMHG | OXYGEN SATURATION: 97 % | WEIGHT: 242.4 LBS | BODY MASS INDEX: 36.74 KG/M2 | TEMPERATURE: 96.7 F | HEIGHT: 68 IN | HEART RATE: 71 BPM | SYSTOLIC BLOOD PRESSURE: 124 MMHG | RESPIRATION RATE: 18 BRPM

## 2025-07-03 DIAGNOSIS — F41.9 ANXIETY: ICD-10-CM

## 2025-07-03 DIAGNOSIS — E66.811 OBESITY, CLASS I, BMI 30-34.9: ICD-10-CM

## 2025-07-03 DIAGNOSIS — F32.9 REACTIVE DEPRESSION: ICD-10-CM

## 2025-07-03 PROCEDURE — 99213 OFFICE O/P EST LOW 20 MIN: CPT | Performed by: NURSE PRACTITIONER

## 2025-07-03 RX ORDER — ESCITALOPRAM OXALATE 5 MG/1
10 TABLET ORAL DAILY
Qty: 30 TABLET | Refills: 1 | Status: SHIPPED | OUTPATIENT
Start: 2025-07-03

## 2025-07-03 RX ORDER — PHENTERMINE HYDROCHLORIDE 37.5 MG/1
37.5 CAPSULE ORAL EVERY MORNING
Qty: 30 CAPSULE | Refills: 0 | Status: SHIPPED | OUTPATIENT
Start: 2025-07-03

## 2025-07-03 NOTE — PROGRESS NOTES
Answers submitted by the patient for this visit:  Weight Management (Submitted on 7/3/2025)  Chief Complaint: Weight Management  Weight: increased  Weight change (lbs): 10  Weight loss treatment: prescription medications  Eating habit changes: No changes  Energy level: unchanged  Physical activity tolerance: stable  Treatment barriers: adherence to exercise, insurance issues, medication cost, adherence to diet  Chief Complaint  Anxiety and Obesity    Subjective        Mere Ivory presents to [facility]  History of Present Illness  Obesity:  hasn't lost weight and caring for mom with hospice care. But has been doing bad habits and eating even though not hungry but things are getting better with medication and would like to try one more month.  Denies chest pain or shortness of breath.    Anxiety  lexapro is doing well but may like an increase.  Anxiety    Symptoms: no chest pain, no dizziness, no palpitations and no shortness of breath    Obesity  Symptoms: no chest pain, no cough, no fever, no numbness and no weakness    Weight Management  Weight:  Increased  Weight change (lbs):  10  Weight loss treatment:  Prescription medications  Treatment barriers:  Adherence to exercise, insurance issues, medication cost and adherence to diet  Physical activity tolerance:  Stable  Energy level:  Unchanged      The following portions of the patient's history were personally reviewed and updated as appropriate: allergies, current medications, past medical history, past surgical history, past family history, and past social history.     Body mass index is 36.87 kg/m².           Past History:    Medical History: has a past medical history of Achilles tendon rupture, right, subsequent encounter, Acute respiratory failure with hypoxemia (02/28/2023), Arthritis, Arthritis (09/23/2021), Asthma, Asthma (06/13/2023), HTN (hypertension), Irregular menstrual cycle (04/18/2023), BRANDT on CPAP  (06/13/2023), PCOS (polycystic ovarian syndrome), and PONV (postoperative nausea and vomiting).     Surgical History: has a past surgical history that includes LASIK (2011); Achilles tendon surgery (Right, 03/18/2022); Gastric Sleeve (N/A, 07/17/2023); Bariatric Surgery (07/2023); and Colonoscopy (N/A, 7/25/2024).     Family History: family history includes Arthritis in her mother; Diabetes in her father and mother; Hypertension in her father and mother; Kidney disease in her mother; Obesity in her mother.     Social History: reports that she quit smoking about 13 years ago. Her smoking use included cigarettes. She started smoking about 18 years ago. She has a 2 pack-year smoking history. She has been exposed to tobacco smoke. She has never used smokeless tobacco. She reports that she does not currently use alcohol. She reports that she does not use drugs.    Allergies: Patient has no known allergies.          Current Outpatient Medications:     albuterol sulfate  (90 Base) MCG/ACT inhaler, Inhale 2 puffs Every 4 (Four) Hours As Needed for Wheezing., Disp: 18 g, Rfl: 11    cetirizine (zyrTEC) 10 MG tablet, Take 1 tablet by mouth Daily., Disp: 90 tablet, Rfl: 3    escitalopram (Lexapro) 5 MG tablet, Take 2 tablets by mouth Daily., Disp: 30 tablet, Rfl: 1    Fluticasone-Salmeterol (ADVAIR/WIXELA) 250-50 MCG/ACT DISKUS, Inhale 1 puff 2 (Two) Times a Day. Rinse mouth with water and spit, Disp: 60 each, Rfl: 11    hydroCHLOROthiazide 12.5 MG tablet, Take 1 tablet by mouth Daily As Needed (swelling)., Disp: 90 tablet, Rfl: 1    ipratropium-albuterol (DUO-NEB) 0.5-2.5 mg/3 ml nebulizer, Take 3 mL by nebulization Every 4 (Four) Hours as needed for shortness of air or wheezing, Disp: 360 mL, Rfl: 5    losartan (COZAAR) 100 MG tablet, Take 1 tablet by mouth Daily., Disp: 90 tablet, Rfl: 1    montelukast (SINGULAIR) 10 MG tablet, Take 1 tablet by mouth Every Night., Disp: 90 tablet, Rfl: 3    multivitamin with minerals  "tablet tablet, Take 1 tablet by mouth Daily., Disp: , Rfl:     phentermine 37.5 MG capsule, Take 1 capsule by mouth Every Morning., Disp: 30 capsule, Rfl: 0    Medications Discontinued During This Encounter   Medication Reason    phentermine 37.5 MG capsule *Therapy completed    escitalopram (Lexapro) 5 MG tablet Reorder         Review of Systems   Constitutional:  Negative for fever.   Respiratory:  Negative for cough and shortness of breath.    Cardiovascular:  Negative for chest pain, palpitations and leg swelling.   Neurological:  Negative for dizziness, weakness, numbness and headache.        Objective         Vitals:    07/03/25 0836   BP: 124/78   BP Location: Right arm   Patient Position: Sitting   Cuff Size: Large Adult   Pulse: 71   Resp: 18   Temp: 96.7 °F (35.9 °C)   TempSrc: Temporal   SpO2: 97%   Weight: 110 kg (242 lb 6.4 oz)   Height: 172.7 cm (67.99\")     Body mass index is 36.87 kg/m².         Physical Exam  Vitals reviewed.   Constitutional:       Appearance: Normal appearance. She is well-developed.   HENT:      Head: Normocephalic and atraumatic.      Mouth/Throat:      Pharynx: No oropharyngeal exudate.   Eyes:      Conjunctiva/sclera: Conjunctivae normal.      Pupils: Pupils are equal, round, and reactive to light.   Cardiovascular:      Rate and Rhythm: Normal rate and regular rhythm.      Heart sounds: Normal heart sounds. No murmur heard.     No friction rub. No gallop.   Pulmonary:      Effort: Pulmonary effort is normal.      Breath sounds: Normal breath sounds. No wheezing or rhonchi.   Skin:     General: Skin is warm and dry.   Neurological:      Mental Status: She is alert and oriented to person, place, and time.   Psychiatric:         Mood and Affect: Mood and affect normal.         Behavior: Behavior normal.         Thought Content: Thought content normal.         Judgment: Judgment normal.             Result Review :               Assessment and Plan     Diagnoses and all orders for " this visit:    1. Obesity, Class I, BMI 30-34.9  -     phentermine 37.5 MG capsule; Take 1 capsule by mouth Every Morning.  Dispense: 30 capsule; Refill: 0    2. Anxiety  -     escitalopram (Lexapro) 5 MG tablet; Take 2 tablets by mouth Daily.  Dispense: 30 tablet; Refill: 1    3. Reactive depression  -     escitalopram (Lexapro) 5 MG tablet; Take 2 tablets by mouth Daily.  Dispense: 30 tablet; Refill: 1              Follow Up     No follow-ups on file.    Patient was given instructions and counseling regarding her condition or for health maintenance advice. Please see specific information pulled into the AVS if appropriate.

## 2025-07-08 ENCOUNTER — TELEPHONE (OUTPATIENT)
Dept: FAMILY MEDICINE CLINIC | Facility: CLINIC | Age: 50
End: 2025-07-08

## 2025-07-14 DIAGNOSIS — F41.9 ANXIETY: ICD-10-CM

## 2025-07-14 DIAGNOSIS — F32.9 REACTIVE DEPRESSION: ICD-10-CM

## 2025-07-14 RX ORDER — ESCITALOPRAM OXALATE 10 MG/1
10 TABLET ORAL DAILY
Qty: 90 TABLET | Refills: 1 | Status: SHIPPED | OUTPATIENT
Start: 2025-07-14

## 2025-07-14 NOTE — TELEPHONE ENCOUNTER
During my last visit we increased my escitalopram 5 MG to 10mg. Your new instructions were to take two tabs of the 5mg to make 10mg. The pill quantity somehow didn’t change and I only got a 15 day supply. Can you please send in for a monthly supply? Thank you.

## (undated) DEVICE — GLV SURG ULTRAFREE MAX PF LTX SZ9

## (undated) DEVICE — DRSNG WND GZ CURAD OIL EMULSION 3X3IN STRL

## (undated) DEVICE — LOU GENERAL ROBOT: Brand: MEDLINE INDUSTRIES, INC.

## (undated) DEVICE — Device: Brand: DEFENDO AIR/WATER/SUCTION AND BIOPSY VALVE

## (undated) DEVICE — SOL IRRG H2O PL/BG 1000ML STRL

## (undated) DEVICE — STANDARD HYPODERMIC NEEDLE,POLYPROPYLENE HUB: Brand: MONOJECT

## (undated) DEVICE — VIOLET BRAIDED (POLYGLACTIN 910), SYNTHETIC ABSORBABLE SUTURE: Brand: COATED VICRYL

## (undated) DEVICE — LINER SURG CANSTR SXN S/RIGD 1500CC

## (undated) DEVICE — 1010 S-DRAPE TOWEL DRAPE 10/BX: Brand: STERI-DRAPE™

## (undated) DEVICE — DRAPE,UTILITY,TAPE,15X26,STERILE: Brand: MEDLINE

## (undated) DEVICE — DEV COND GAS LAP INSUFLOW W/LUER CONN

## (undated) DEVICE — NDL HYPO ECLPS SFTY 21G 1 1/2IN

## (undated) DEVICE — SUT MNCRYL PLS ANTIB UD 4/0 PS2 18IN

## (undated) DEVICE — ARM DRAPE

## (undated) DEVICE — SOL IRR SALINE 0.9% 100ML STRL

## (undated) DEVICE — APPL CHLORAPREP HI/LITE 26ML ORNG

## (undated) DEVICE — SYR LUERLOK 20CC BX/50

## (undated) DEVICE — IRRIGATOR BULB ASEPTO 60CC STRL

## (undated) DEVICE — GLV SURG SENSICARE PI MIC PF SZ6 LF STRL

## (undated) DEVICE — PENCL E/S SMOKEEVAC W/TELESCP CANN

## (undated) DEVICE — GLV SURG SENSICARE PI MIC PF SZ8.5 LF STRL

## (undated) DEVICE — GLV SURG SENSICARE POLYISPRN W/ALOE PF LF 6.5 GRN STRL

## (undated) DEVICE — EXTREMITY-LF: Brand: MEDLINE INDUSTRIES, INC.

## (undated) DEVICE — Device

## (undated) DEVICE — SUT VIC 0 CT1 27IN DYED J340H

## (undated) DEVICE — GOWN,NON-REINFORCED,SIRUS,SET IN SLV,XL: Brand: MEDLINE

## (undated) DEVICE — SOL NACL 0.9PCT 1000ML

## (undated) DEVICE — SUT SILK 0 SH 30IN K834H

## (undated) DEVICE — BNDG COTN/ELAS FLEXMASTER DBL/LENGTH CLIP/CLS 6IN 11YD

## (undated) DEVICE — CONN TBG Y 5 IN 1 LF STRL

## (undated) DEVICE — SEALANT WND FIBRIN TISSEEL PREFIL/SYR/PRIMAFZ 2ML

## (undated) DEVICE — DISPOSABLE TOURNIQUET CUFF SINGLE BLADDER, SINGLE PORT AND QUICK CONNECT CONNECTOR: Brand: COLOR CUFF

## (undated) DEVICE — LAPAROVUE VISIBILITY SYSTEM LAPAROSCOPIC SOLUTIONS: Brand: LAPAROVUE

## (undated) DEVICE — GOWN,NON-REINFORCED,SIRUS,SET IN SLV,XXL: Brand: MEDLINE

## (undated) DEVICE — SOL IRR NACL 0.9PCT BT 1000ML

## (undated) DEVICE — DECANTER BAG 9": Brand: MEDLINE INDUSTRIES, INC.

## (undated) DEVICE — BNDG ESMARK 4IN 12FT LF STRL BLU

## (undated) DEVICE — APL DUPLOSPRAYER MIS 40CM

## (undated) DEVICE — GLV SURG SENSICARE PI PF LF 8.5 GRN STRL

## (undated) DEVICE — GAUZE,SPONGE,4"X4",16PLY,STRL,LF,10/TRAY: Brand: MEDLINE

## (undated) DEVICE — SEAL

## (undated) DEVICE — ENDOPATH XCEL WITH OPTIVIEW TECHNOLOGY BLADELESS TROCARS WITH STABILITY SLEEVES: Brand: ENDOPATH XCEL OPTIVIEW

## (undated) DEVICE — SOLIDIFIER LIQLOC PLS 1500CC BT

## (undated) DEVICE — VESSEL SEALER EXTEND: Brand: ENDOWRIST

## (undated) DEVICE — MARKR SKIN W/RULR AND LBL

## (undated) DEVICE — 500ML,PRESSURE INFUSER W/STOPCOCK: Brand: MEDLINE

## (undated) DEVICE — TROC STANDARDTROCAR FOR/TITANSGS 19MM DISP STRL

## (undated) DEVICE — LAPAROSCOPIC DISSECTOR: Brand: DEROYAL

## (undated) DEVICE — Device: Brand: STANDARD BOUGIE, 38FR

## (undated) DEVICE — UNDYED BRAIDED (POLYGLACTIN 910), SYNTHETIC ABSORBABLE SUTURE: Brand: COATED VICRYL

## (undated) DEVICE — BLADELESS OBTURATOR: Brand: WECK VISTA

## (undated) DEVICE — CONN JET HYDRA H20 AUXILIARY DISP

## (undated) DEVICE — SPNG LAP 18X18IN LF STRL PK/5